# Patient Record
Sex: MALE | Race: WHITE | Employment: FULL TIME | URBAN - NONMETROPOLITAN AREA
[De-identification: names, ages, dates, MRNs, and addresses within clinical notes are randomized per-mention and may not be internally consistent; named-entity substitution may affect disease eponyms.]

---

## 2018-02-05 ENCOUNTER — APPOINTMENT (OUTPATIENT)
Dept: GENERAL RADIOLOGY | Age: 53
End: 2018-02-05
Payer: COMMERCIAL

## 2018-02-05 ENCOUNTER — HOSPITAL ENCOUNTER (EMERGENCY)
Age: 53
Discharge: HOME | End: 2018-02-05
Attending: EMERGENCY MEDICINE
Payer: COMMERCIAL

## 2018-02-05 VITALS
OXYGEN SATURATION: 98 % | HEIGHT: 70 IN | RESPIRATION RATE: 18 BRPM | WEIGHT: 203 LBS | HEART RATE: 82 BPM | BODY MASS INDEX: 29.06 KG/M2 | SYSTOLIC BLOOD PRESSURE: 119 MMHG | DIASTOLIC BLOOD PRESSURE: 76 MMHG | TEMPERATURE: 98.3 F

## 2018-02-05 DIAGNOSIS — S80.11XA HEMATOMA OF RIGHT LOWER EXTREMITY, INITIAL ENCOUNTER: Primary | ICD-10-CM

## 2018-02-05 PROCEDURE — 73502 X-RAY EXAM HIP UNI 2-3 VIEWS: CPT

## 2018-02-05 PROCEDURE — 6370000000 HC RX 637 (ALT 250 FOR IP): Performed by: EMERGENCY MEDICINE

## 2018-02-05 PROCEDURE — 99283 EMERGENCY DEPT VISIT LOW MDM: CPT

## 2018-02-05 PROCEDURE — 73590 X-RAY EXAM OF LOWER LEG: CPT

## 2018-02-05 RX ORDER — HYDROCODONE BITARTRATE AND ACETAMINOPHEN 5; 325 MG/1; MG/1
1 TABLET ORAL ONCE
Status: COMPLETED | OUTPATIENT
Start: 2018-02-05 | End: 2018-02-05

## 2018-02-05 RX ORDER — HYDROCODONE BITARTRATE AND ACETAMINOPHEN 5; 325 MG/1; MG/1
1 TABLET ORAL EVERY 6 HOURS PRN
Qty: 12 TABLET | Refills: 0 | Status: SHIPPED | OUTPATIENT
Start: 2018-02-05 | End: 2018-02-12

## 2018-02-05 RX ADMIN — HYDROCODONE BITARTRATE AND ACETAMINOPHEN 1 TABLET: 5; 325 TABLET ORAL at 20:42

## 2018-02-05 ASSESSMENT — PAIN DESCRIPTION - LOCATION: LOCATION: LEG

## 2018-02-05 ASSESSMENT — PAIN SCALES - GENERAL
PAINLEVEL_OUTOF10: 3
PAINLEVEL_OUTOF10: 2
PAINLEVEL_OUTOF10: 3

## 2018-02-05 ASSESSMENT — PAIN DESCRIPTION - ORIENTATION: ORIENTATION: RIGHT

## 2018-02-06 ENCOUNTER — HOSPITAL ENCOUNTER (OUTPATIENT)
Dept: GENERAL RADIOLOGY | Age: 53
Discharge: HOME | End: 2018-02-06
Payer: COMMERCIAL

## 2018-02-06 ENCOUNTER — HOSPITAL ENCOUNTER (OUTPATIENT)
Dept: INTERVENTIONAL RADIOLOGY/VASCULAR | Age: 53
Discharge: HOME | End: 2018-02-06
Payer: COMMERCIAL

## 2018-02-06 ENCOUNTER — HOSPITAL ENCOUNTER (OUTPATIENT)
Age: 53
Discharge: HOME | End: 2018-02-06
Payer: COMMERCIAL

## 2018-02-06 DIAGNOSIS — M25.571 RIGHT ANKLE PAIN, UNSPECIFIED CHRONICITY: ICD-10-CM

## 2018-02-06 DIAGNOSIS — R52 PAIN: ICD-10-CM

## 2018-02-06 PROCEDURE — 93971 EXTREMITY STUDY: CPT

## 2018-02-06 PROCEDURE — 73610 X-RAY EXAM OF ANKLE: CPT

## 2018-02-06 NOTE — ED TRIAGE NOTES
Pt fell into car door apx. 30 minutes prior to arrival. Large hematoma noted to right shin area and mild pain noted to right thigh.

## 2018-02-06 NOTE — ED PROVIDER NOTES
eMERGENCY dEPARTMENT eNCOUnter      [facility]    Chief Complaint   Patient presents with    Leg Injury     right lower leg, thigh       HPI    Mya Grullon is a 46 y.o. male who presents  Above-noted complaint. She was doing fine. He is a kidney transplant patient. It is [place]. Slept essentially kind of taking his shin into the car area. He then landed on his right buttocks and hip. Complains of hematoma to his right tib-fib area and right buttocks/hip pain. He is able to ambulate. PAST MEDICAL HISTORY    Past Medical History:   Diagnosis Date    Non Hodgkin's lymphoma ([address]     1995/96       SURGICAL HISTORY    Past Surgical History:   Procedure Laterality Date    APPENDECTOMY      0    KIDNEY TRANSPLANT      2000    KIDNEY TRANSPLANT  06/06/2000       CURRENT MEDICATIONS    Current Outpatient Rx   Medication Sig Dispense Refill    Alendronate Sodium 70 MG TBEF Take by mouth      lovastatin (MEVACOR) 10 MG tablet Take 10 mg by mouth nightly.  mycophenolate (CELLCEPT) 500 MG tablet Take 1,250 mg by mouth 2 times daily.  Ascorbic Acid (VITAMIN C) 500 MG tablet Take 1,000 mg by mouth daily.  therapeutic multivitamin-minerals (THERAGRAN-M) tablet Take 1 tablet by mouth daily.  predniSONE (DELTASONE) 10 MG tablet Take 10 mg by mouth daily.  furosemide (LASIX) 40 MG tablet Take 20 mg by mouth daily       aspirin 81 MG tablet Take 81 mg by mouth daily.  enalapril (VASOTEC) 2.5 MG tablet Take 2.5 mg by mouth daily.  sulfamethoxazole-trimethoprim (BACTRIM DS;SEPTRA DS) 800-160 MG per tablet Take 1 tablet by mouth once.  terbinafine (LAMISIL) 250 MG tablet Take 250 mg by mouth daily      Misc Natural Products (GLUCOSAMINE CHOND COMPLEX/MSM PO) Take  by mouth.          ALLERGIES    No Known Allergies    FAMILY HISTORY    Family History   Problem Relation Age of Onset    Heart Failure Father      heart attack w/stents    Diabetes Sister      5 years ago diagnosed    Diabetes Brother      5 years ago diagnosed    Cancer Mother 61     colon is now         SOCIAL HISTORY    Social History     Social History    Marital status:      Spouse name: N/A    Number of children: N/A    Years of education: N/A     Social History Main Topics    Smoking status: Never Smoker    Smokeless tobacco: Never Used    Alcohol use Yes      Comment: very rare use.  Drug use: No    Sexual activity: Not Asked     Other Topics Concern    None     Social History Narrative    None       REVIEW OF SYSTEMS    Positive for right tibial pain and right gluteal and hip pain. No loss of consciousness spinal pain neck pain chest pain abdominal pain  All systems negative except as marked. PHYSICAL EXAM    VITAL SIGNS: /76   Pulse 82   Temp 98.3 °F (36.8 °C) (Oral)   Resp 18   Ht 5' 10\" (1.778 m)   Wt 203 lb (92.1 kg)   SpO2 98%   BMI 29.13 kg/m²    Constitutional:  Alert not toxtic or ill, able to give coherent history  HENT:  Normocephalic, Atraumatic, Bilateral external ears normal, Oropharynx moist, No oral exudates, Nose normal.  Dry blood at the left nose although he states he did not hit his head  Cervical Spine: Normal range of motion,  No stridor. No tenderness, Supple,  Eyes:  No discharge or  Swelling,Conjunctiva normal, PERRL, EOMI,  Respiratory: No respiratory distress, Normal breath sounds,  No wheezing, No chest tenderness. Cardiovascular:  Normal heart rate, Normal rhythm, No murmurs, No rubs, No gallops. GI:  No reproducible pain, Bowel sounds normal, Soft, No masses, No pulsatile masses. No tenderness  Musculoskeletal:  Intact distal pulses, large anterior tibial hematoma without expanding hematoma or pulsatile bleeding. Calf and foot and ankle are normal.  Neurovascular exam is normal.  Pain is coming to the right gluteal area without step-off or bony deformity. Back:No tenderness.    Integument:  Warm, Dry, No erythema, No rash (on exposed areas)   Lymphatic:  No lymphadenopathy noted. Neurologic:  Alert & oriented x 3, Normal motor function, Normal sensory function, No focal deficits noted. Psychiatric:  Affect normal, Judgment normal, Mood normal.                     RADIOLOGY    XR TIBIA FIBULA RIGHT (2 VIEWS)   Final Result    No acute fracture or dislocation. Old healed fracture of the proximal/mid right fibula. **This report has been created using voice recognition software. It may contain minor errors which are inherent in voice recognition technology. **      Final report electronically signed by Dr. Kike Navarro on 2/5/2018 9:21 PM      XR HIP 2-3 VW W PELVIS RIGHT   Final Result    No acute bone or joint abnormality. **This report has been created using voice recognition software. It may contain minor errors which are inherent in voice recognition technology. **      Final report electronically signed by Dr. Kike Navarro on 2/5/2018 9:19 PM          PROCEDURES    none      CONSULTS:  None      CRITICAL CARE:  None    ED COURSE & MEDICAL DECISION MAKING    Pertinent Labs & Imaging studies reviewed. (See chart for details)  Right hip pain and right tibial pain. Hip joint appears to be nontoxic or irritable. Although we'll check some plain films at this time. Does have a right tibial hematoma without obvious expanding hematoma or signs of compartment syndrome. His foot and ankle and tib-fib and heel and calf are normal.    REASSESSMENT  Patient rechecked and updated on lab/xray status, progress and results. .  Patient was reassessed and condition was unchanged after tx. No further needs at this time. X-ray shows some changes to the mid shaft fibula near his hematoma although it almost appears chronic in nature or some type of nonunion. In discussion with the patient he states he had age 12 he had an injury and basically never had it looked at.   I do question whether he has a fracture

## 2018-02-16 ENCOUNTER — HOSPITAL ENCOUNTER (OUTPATIENT)
Dept: MRI IMAGING | Age: 53
Discharge: HOME | End: 2018-02-16
Payer: COMMERCIAL

## 2018-02-16 DIAGNOSIS — M25.571 ACUTE RIGHT ANKLE PAIN: ICD-10-CM

## 2018-02-16 DIAGNOSIS — S80.11XA CONTUSION OF RIGHT LOWER EXTREMITY, INITIAL ENCOUNTER: ICD-10-CM

## 2018-02-16 PROCEDURE — 73718 MRI LOWER EXTREMITY W/O DYE: CPT

## 2018-02-20 ENCOUNTER — HOSPITAL ENCOUNTER (OUTPATIENT)
Dept: GENERAL RADIOLOGY | Age: 53
Discharge: HOME | End: 2018-02-20
Payer: COMMERCIAL

## 2018-02-20 ENCOUNTER — HOSPITAL ENCOUNTER (OUTPATIENT)
Age: 53
Discharge: HOME | End: 2018-02-20
Payer: COMMERCIAL

## 2018-02-20 DIAGNOSIS — Z01.818 PREOP EXAMINATION: ICD-10-CM

## 2018-02-20 DIAGNOSIS — S80.11XA HEMATOMA OF LOWER LIMB, RIGHT, INITIAL ENCOUNTER: ICD-10-CM

## 2018-02-20 LAB
ANION GAP SERPL CALCULATED.3IONS-SCNC: 13 MEQ/L (ref 8–16)
ANISOCYTOSIS: ABNORMAL
BASOPHILS # BLD: 0.4 %
BASOPHILS ABSOLUTE: 0 THOU/MM3 (ref 0–0.1)
BUN BLDV-MCNC: 17 MG/DL (ref 7–22)
CALCIUM SERPL-MCNC: 9.5 MG/DL (ref 8.5–10.5)
CHLORIDE BLD-SCNC: 102 MEQ/L (ref 98–111)
CO2: 25 MEQ/L (ref 23–33)
CREAT SERPL-MCNC: 1 MG/DL (ref 0.4–1.2)
EKG ATRIAL RATE: 79 BPM
EKG P AXIS: 73 DEGREES
EKG P-R INTERVAL: 150 MS
EKG Q-T INTERVAL: 400 MS
EKG QRS DURATION: 96 MS
EKG QTC CALCULATION (BAZETT): 458 MS
EKG R AXIS: 7 DEGREES
EKG T AXIS: 61 DEGREES
EKG VENTRICULAR RATE: 79 BPM
EOSINOPHIL # BLD: 0.3 %
EOSINOPHILS ABSOLUTE: 0 THOU/MM3 (ref 0–0.4)
GFR SERPL CREATININE-BSD FRML MDRD: 78 ML/MIN/1.73M2
GLUCOSE BLD-MCNC: 111 MG/DL (ref 70–108)
HCT VFR BLD CALC: 42 % (ref 42–52)
HEMOGLOBIN: 13.5 GM/DL (ref 14–18)
LYMPHOCYTES # BLD: 17.2 %
LYMPHOCYTES ABSOLUTE: 1.4 THOU/MM3 (ref 1–4.8)
MCH RBC QN AUTO: 30.2 PG (ref 27–31)
MCHC RBC AUTO-ENTMCNC: 32.2 GM/DL (ref 33–37)
MCV RBC AUTO: 93.8 FL (ref 80–94)
MONOCYTES # BLD: 7.1 %
MONOCYTES ABSOLUTE: 0.6 THOU/MM3 (ref 0.4–1.3)
MRSA SCREEN RT-PCR: NEGATIVE
NUCLEATED RED BLOOD CELLS: 0 /100 WBC
PDW BLD-RTO: 15.7 % (ref 11.5–14.5)
PLATELET # BLD: 222 THOU/MM3 (ref 130–400)
PMV BLD AUTO: 8.4 FL (ref 7.4–10.4)
POTASSIUM SERPL-SCNC: 4.6 MEQ/L (ref 3.5–5.2)
RBC # BLD: 4.48 MILL/MM3 (ref 4.7–6.1)
SEG NEUTROPHILS: 75 %
SEGMENTED NEUTROPHILS ABSOLUTE COUNT: 6.2 THOU/MM3 (ref 1.8–7.7)
SODIUM BLD-SCNC: 140 MEQ/L (ref 135–145)
WBC # BLD: 8.2 THOU/MM3 (ref 4.8–10.8)

## 2018-02-20 PROCEDURE — 71046 X-RAY EXAM CHEST 2 VIEWS: CPT

## 2018-02-20 PROCEDURE — 80048 BASIC METABOLIC PNL TOTAL CA: CPT

## 2018-02-20 PROCEDURE — 87641 MR-STAPH DNA AMP PROBE: CPT

## 2018-02-20 PROCEDURE — 85025 COMPLETE CBC W/AUTO DIFF WBC: CPT

## 2018-02-20 PROCEDURE — 93005 ELECTROCARDIOGRAM TRACING: CPT | Performed by: ORTHOPAEDIC SURGERY

## 2018-02-20 PROCEDURE — 36415 COLL VENOUS BLD VENIPUNCTURE: CPT

## 2018-02-22 ENCOUNTER — OFFICE VISIT (OUTPATIENT)
Dept: INTERNAL MEDICINE CLINIC | Age: 53
End: 2018-02-22
Payer: COMMERCIAL

## 2018-02-22 VITALS
HEIGHT: 70 IN | DIASTOLIC BLOOD PRESSURE: 60 MMHG | HEART RATE: 88 BPM | WEIGHT: 202 LBS | BODY MASS INDEX: 28.92 KG/M2 | SYSTOLIC BLOOD PRESSURE: 120 MMHG

## 2018-02-22 DIAGNOSIS — S80.11XA HEMATOMA OF RIGHT LOWER EXTREMITY, INITIAL ENCOUNTER: ICD-10-CM

## 2018-02-22 DIAGNOSIS — Z79.52 CURRENT CHRONIC USE OF SYSTEMIC STEROIDS: ICD-10-CM

## 2018-02-22 DIAGNOSIS — Z01.818 PREOP EXAM FOR INTERNAL MEDICINE: Primary | ICD-10-CM

## 2018-02-22 DIAGNOSIS — I10 ESSENTIAL HYPERTENSION: ICD-10-CM

## 2018-02-22 DIAGNOSIS — I63.9 CEREBROVASCULAR ACCIDENT (CVA), UNSPECIFIED MECHANISM (HCC): ICD-10-CM

## 2018-02-22 DIAGNOSIS — Z94.0 RENAL TRANSPLANT RECIPIENT: ICD-10-CM

## 2018-02-22 DIAGNOSIS — E78.5 HYPERLIPIDEMIA, UNSPECIFIED HYPERLIPIDEMIA TYPE: ICD-10-CM

## 2018-02-22 DIAGNOSIS — Z85.72 HISTORY OF NON-HODGKIN'S LYMPHOMA: ICD-10-CM

## 2018-02-22 PROCEDURE — 99244 OFF/OP CNSLTJ NEW/EST MOD 40: CPT | Performed by: INTERNAL MEDICINE

## 2018-02-22 RX ORDER — MYCOPHENOLATE MOFETIL 250 MG/1
1000 CAPSULE ORAL 2 TIMES DAILY
COMMUNITY

## 2018-02-22 NOTE — PROGRESS NOTES
daily       enalapril (VASOTEC) 2.5 MG tablet Take 2.5 mg by mouth daily.  sulfamethoxazole-trimethoprim (BACTRIM DS;SEPTRA DS) 800-160 MG per tablet Take 1 tablet by mouth once.  aspirin 81 MG tablet Take 81 mg by mouth daily. No current facility-administered medications for this visit. No Known Allergies    Social History     Social History    Marital status:      Spouse name: N/A    Number of children: N/A    Years of education: N/A     Occupational History    Not on file. Social History Main Topics    Smoking status: Never Smoker    Smokeless tobacco: Never Used    Alcohol use 0.6 oz/week     1 Shots of liquor per week      Comment: very rare use.  Drug use: No    Sexual activity: Not on file     Other Topics Concern    Not on file     Social History Narrative    No narrative on file       Family History   Problem Relation Age of Onset    Heart Failure Father      heart attack w/stents    Diabetes Sister      5 years ago diagnosed    Diabetes Brother      5 years ago diagnosed   [facility] Cancer Mother 61     colon - is now         Review of Systems - General ROS: negative for - chills or fever  Psychological ROS: negative for - anxiety and depression  Hematological and Lymphatic ROS: No history of blood clots or bleeding disorder.    Respiratory ROS: no cough, shortness of breath, or wheezing  Cardiovascular ROS: no chest pain or dyspnea on exertion, tolerates a flight of stairs, vacuuming  Gastrointestinal ROS: no abdominal pain, change in bowel habits, or black or bloody stools  Genito-Urinary ROS: no dysuria, trouble voiding, or hematuria  Musculoskeletal ROS: negative for - muscle pain or muscular weakness, positive right shin pain intermittently  Neurological ROS: negative for - headaches, seizures or weakness  Dermatological ROS: negative for - rash or skin lesion changes    Blood pressure 120/60, pulse 88, height 5' 10\" (1.778 m), weight 202 lb (91.6

## 2018-02-25 PROBLEM — Z85.72 HISTORY OF NON-HODGKIN'S LYMPHOMA: Status: ACTIVE | Noted: 2018-02-25

## 2020-10-19 ENCOUNTER — HOSPITAL ENCOUNTER (OUTPATIENT)
Age: 55
Discharge: HOME | End: 2020-10-19
Payer: COMMERCIAL

## 2020-10-19 LAB
EKG ATRIAL RATE: 70 BPM
EKG P AXIS: 78 DEGREES
EKG P-R INTERVAL: 158 MS
EKG Q-T INTERVAL: 418 MS
EKG QRS DURATION: 92 MS
EKG QTC CALCULATION (BAZETT): 451 MS
EKG R AXIS: -27 DEGREES
EKG T AXIS: 17 DEGREES
EKG VENTRICULAR RATE: 70 BPM

## 2020-10-19 PROCEDURE — 93005 ELECTROCARDIOGRAM TRACING: CPT | Performed by: SPECIALIST

## 2020-10-20 PROCEDURE — 93010 ELECTROCARDIOGRAM REPORT: CPT | Performed by: NUCLEAR MEDICINE

## 2020-10-20 RX ORDER — SODIUM CHLORIDE 9 MG/ML
INJECTION, SOLUTION INTRAVENOUS
Status: DISCONTINUED | OUTPATIENT
Start: 2020-10-28 | End: 2020-10-20 | Stop reason: CLARIF

## 2020-10-23 ENCOUNTER — HOSPITAL ENCOUNTER (OUTPATIENT)
Age: 55
Setting detail: SPECIMEN
Discharge: HOME | End: 2020-10-23
Payer: COMMERCIAL

## 2020-10-23 PROCEDURE — U0003 INFECTIOUS AGENT DETECTION BY NUCLEIC ACID (DNA OR RNA); SEVERE ACUTE RESPIRATORY SYNDROME CORONAVIRUS 2 (SARS-COV-2) (CORONAVIRUS DISEASE [COVID-19]), AMPLIFIED PROBE TECHNIQUE, MAKING USE OF HIGH THROUGHPUT TECHNOLOGIES AS DESCRIBED BY CMS-2020-01-R: HCPCS

## 2020-10-25 LAB — SARS-COV-2: NOT DETECTED

## 2020-10-28 ENCOUNTER — ANESTHESIA (OUTPATIENT)
Dept: OPERATING ROOM | Age: 55
End: 2020-10-28
Payer: COMMERCIAL

## 2020-10-28 ENCOUNTER — ANESTHESIA EVENT (OUTPATIENT)
Dept: OPERATING ROOM | Age: 55
End: 2020-10-28
Payer: COMMERCIAL

## 2020-10-28 ENCOUNTER — HOSPITAL ENCOUNTER (OUTPATIENT)
Age: 55
Setting detail: OUTPATIENT SURGERY
Discharge: HOME | End: 2020-10-28
Attending: SPECIALIST | Admitting: SPECIALIST
Payer: COMMERCIAL

## 2020-10-28 VITALS
SYSTOLIC BLOOD PRESSURE: 99 MMHG | HEART RATE: 71 BPM | OXYGEN SATURATION: 99 % | BODY MASS INDEX: 30.86 KG/M2 | TEMPERATURE: 96.9 F | WEIGHT: 215.6 LBS | DIASTOLIC BLOOD PRESSURE: 58 MMHG | HEIGHT: 70 IN | RESPIRATION RATE: 16 BRPM

## 2020-10-28 VITALS
SYSTOLIC BLOOD PRESSURE: 103 MMHG | TEMPERATURE: 96.8 F | DIASTOLIC BLOOD PRESSURE: 57 MMHG | OXYGEN SATURATION: 100 % | RESPIRATION RATE: 8 BRPM

## 2020-10-28 PROCEDURE — 2580000003 HC RX 258: Performed by: NURSE ANESTHETIST, CERTIFIED REGISTERED

## 2020-10-28 PROCEDURE — 2500000003 HC RX 250 WO HCPCS: Performed by: NURSE ANESTHETIST, CERTIFIED REGISTERED

## 2020-10-28 PROCEDURE — 88305 TISSUE EXAM BY PATHOLOGIST: CPT

## 2020-10-28 PROCEDURE — 3600000012 HC SURGERY LEVEL 2 ADDTL 15MIN: Performed by: SPECIALIST

## 2020-10-28 PROCEDURE — 7100000011 HC PHASE II RECOVERY - ADDTL 15 MIN: Performed by: SPECIALIST

## 2020-10-28 PROCEDURE — 3600000002 HC SURGERY LEVEL 2 BASE: Performed by: SPECIALIST

## 2020-10-28 PROCEDURE — 3700000001 HC ADD 15 MINUTES (ANESTHESIA): Performed by: SPECIALIST

## 2020-10-28 PROCEDURE — 6360000002 HC RX W HCPCS: Performed by: NURSE ANESTHETIST, CERTIFIED REGISTERED

## 2020-10-28 PROCEDURE — 3700000000 HC ANESTHESIA ATTENDED CARE: Performed by: SPECIALIST

## 2020-10-28 PROCEDURE — 2500000003 HC RX 250 WO HCPCS: Performed by: SPECIALIST

## 2020-10-28 PROCEDURE — 2709999900 HC NON-CHARGEABLE SUPPLY: Performed by: SPECIALIST

## 2020-10-28 PROCEDURE — 7100000010 HC PHASE II RECOVERY - FIRST 15 MIN: Performed by: SPECIALIST

## 2020-10-28 RX ORDER — PROPOFOL 10 MG/ML
INJECTION, EMULSION INTRAVENOUS CONTINUOUS PRN
Status: DISCONTINUED | OUTPATIENT
Start: 2020-10-28 | End: 2020-10-28 | Stop reason: SDUPTHER

## 2020-10-28 RX ORDER — MIDAZOLAM HYDROCHLORIDE 1 MG/ML
INJECTION INTRAMUSCULAR; INTRAVENOUS PRN
Status: DISCONTINUED | OUTPATIENT
Start: 2020-10-28 | End: 2020-10-28 | Stop reason: SDUPTHER

## 2020-10-28 RX ORDER — SODIUM CHLORIDE 9 MG/ML
INJECTION, SOLUTION INTRAVENOUS CONTINUOUS
Status: DISCONTINUED | OUTPATIENT
Start: 2020-10-28 | End: 2020-10-28 | Stop reason: HOSPADM

## 2020-10-28 RX ORDER — LIDOCAINE HYDROCHLORIDE AND EPINEPHRINE 10; 10 MG/ML; UG/ML
INJECTION, SOLUTION INFILTRATION; PERINEURAL PRN
Status: DISCONTINUED | OUTPATIENT
Start: 2020-10-28 | End: 2020-10-28 | Stop reason: ALTCHOICE

## 2020-10-28 RX ORDER — LIDOCAINE HYDROCHLORIDE 20 MG/ML
INJECTION, SOLUTION EPIDURAL; INFILTRATION; INTRACAUDAL; PERINEURAL PRN
Status: DISCONTINUED | OUTPATIENT
Start: 2020-10-28 | End: 2020-10-28 | Stop reason: SDUPTHER

## 2020-10-28 RX ORDER — ONDANSETRON 2 MG/ML
INJECTION INTRAMUSCULAR; INTRAVENOUS PRN
Status: DISCONTINUED | OUTPATIENT
Start: 2020-10-28 | End: 2020-10-28 | Stop reason: SDUPTHER

## 2020-10-28 RX ORDER — CEFAZOLIN SODIUM 1 G/3ML
INJECTION, POWDER, FOR SOLUTION INTRAMUSCULAR; INTRAVENOUS PRN
Status: DISCONTINUED | OUTPATIENT
Start: 2020-10-28 | End: 2020-10-28 | Stop reason: SDUPTHER

## 2020-10-28 RX ORDER — SODIUM CHLORIDE 9 MG/ML
INJECTION, SOLUTION INTRAVENOUS CONTINUOUS PRN
Status: DISCONTINUED | OUTPATIENT
Start: 2020-10-28 | End: 2020-10-28 | Stop reason: SDUPTHER

## 2020-10-28 RX ORDER — FENTANYL CITRATE 50 UG/ML
INJECTION, SOLUTION INTRAMUSCULAR; INTRAVENOUS PRN
Status: DISCONTINUED | OUTPATIENT
Start: 2020-10-28 | End: 2020-10-28 | Stop reason: SDUPTHER

## 2020-10-28 RX ORDER — PROPOFOL 10 MG/ML
INJECTION, EMULSION INTRAVENOUS PRN
Status: DISCONTINUED | OUTPATIENT
Start: 2020-10-28 | End: 2020-10-28 | Stop reason: SDUPTHER

## 2020-10-28 RX ADMIN — ONDANSETRON HYDROCHLORIDE 4 MG: 4 INJECTION, SOLUTION INTRAMUSCULAR; INTRAVENOUS at 09:50

## 2020-10-28 RX ADMIN — LIDOCAINE HYDROCHLORIDE 60 MG: 20 INJECTION, SOLUTION EPIDURAL; INFILTRATION; INTRACAUDAL; PERINEURAL at 09:20

## 2020-10-28 RX ADMIN — CEFAZOLIN 2000 MG: 1 INJECTION, POWDER, FOR SOLUTION INTRAMUSCULAR; INTRAVENOUS; PARENTERAL at 09:24

## 2020-10-28 RX ADMIN — FENTANYL CITRATE 100 MCG: 50 INJECTION, SOLUTION INTRAMUSCULAR; INTRAVENOUS at 09:20

## 2020-10-28 RX ADMIN — SODIUM CHLORIDE: 9 INJECTION, SOLUTION INTRAVENOUS at 09:14

## 2020-10-28 RX ADMIN — MIDAZOLAM HYDROCHLORIDE 2 MG: 1 INJECTION, SOLUTION INTRAMUSCULAR; INTRAVENOUS at 09:16

## 2020-10-28 RX ADMIN — PROPOFOL 120 MCG/KG/MIN: 10 INJECTION, EMULSION INTRAVENOUS at 09:21

## 2020-10-28 RX ADMIN — PROPOFOL 40 MG: 10 INJECTION, EMULSION INTRAVENOUS at 09:20

## 2020-10-28 ASSESSMENT — PULMONARY FUNCTION TESTS
PIF_VALUE: 0
PIF_VALUE: 1
PIF_VALUE: 0
PIF_VALUE: 1
PIF_VALUE: 0
PIF_VALUE: 1
PIF_VALUE: 1
PIF_VALUE: 0
PIF_VALUE: 0
PIF_VALUE: 1
PIF_VALUE: 0
PIF_VALUE: 1
PIF_VALUE: 1
PIF_VALUE: 0
PIF_VALUE: 1
PIF_VALUE: 1
PIF_VALUE: 0
PIF_VALUE: 1
PIF_VALUE: 1
PIF_VALUE: 0
PIF_VALUE: 1
PIF_VALUE: 0
PIF_VALUE: 1
PIF_VALUE: 0
PIF_VALUE: 1
PIF_VALUE: 0
PIF_VALUE: 0

## 2020-10-28 NOTE — PROGRESS NOTES
7083 patient to pacu via chair. Surgery RN and CRNA at bedside with report. IV patent and infusing. Vitals stable. Incision covered and wrapped. 1003 gave snack and drink.     1015 IV removed no complications  6140 Dr Roxi Singh in to see patient  [phone] reviewed discharge instructions with patient and family    [phone] escorted patient to discharge
Dressing= bacitracin, xeroform, gauze, kerlix, and ace
5 - 8 questions    Adapted from:   STOP Questionnaire: A Tool to Screen Patients for Obstructive Sleep Apnea   LEOBARDO Vang., BRET Hughes.B.B.S., Brittani Salinas M.D., Ellamae Holter. Tru Childress, Ph.D., KAMLESH ChristianB.B.S., Francis Amaya, M.Sc., Richa Carlin M.D., Velvet Loud. MARY Montes De OcaC.    Anesthesiology 2008; [id number] Copyright 2008, the [facility] [address] of Anesthesiologists, [place] [address].   ----------------------------------------------------------------------------------------------------------------

## 2020-10-28 NOTE — ANESTHESIA POSTPROCEDURE EVALUATION
Department of Anesthesiology  Postprocedure Note    Patient: Megan Prescott  MRN: [id number]  YOB: 1965  Date of evaluation: 10/28/2020  Time:  12:37 PM     Procedure Summary     Date:  10/28/20 Room / Location:  [address] [place] [address] 04 / Skylar Holt    Anesthesia Start:  4720 Anesthesia Stop:  1001    Procedure:  WIDER EXCISION MELANOMA VERTEX OF SCALP (N/A ) Diagnosis:  (MELANOMA VERTEX OF SCALP)    Surgeon:  Franc Brewer MD Responsible Provider:  Facundo Wall MD    Anesthesia Type:  MAC ASA Status:  3          Anesthesia Type: MAC    Sweta Phase I:      Sweta Phase II: Sweta Score: 10    Last vitals: Reviewed and per EMR flowsheets.        Anesthesia Post Evaluation

## 2020-10-28 NOTE — OP NOTE
Same       Procedure(s):  WIDER EXCISION MELANOMA VERTEX OF SCALP    Surgeon(s):  Rachna Purcell MD    Assistant:   Physician Assistant: Selina Roy PA-C    Anesthesia: Monitor Anesthesia Care    Estimated Blood Loss (mL): Minimal    Complications: None    Specimens:   ID Type Source Tests Collected by Time Destination   A :  Tissue Scalp SURGICAL PATHOLOGY Rachna Purcell MD 10/28/2020 1810        Implants:  * No implants in log *      Drains: * No LDAs found *    Findings: 3.5cm length, 0.3mm Breslow depth malignant melanoma of vertex of scalp    Detailed Description of Procedure:    Wide excision of vertex of scalp melanoma creating a 8cm2 defect that was repaired with an  adjacent tissue transfer (20 cm2) (CPT 75009)    Electronically signed by Rachna Purcell MD on 10/28/2020 at 10:14 AM

## 2020-12-05 ENCOUNTER — HOSPITAL ENCOUNTER (OUTPATIENT)
Dept: GENERAL RADIOLOGY | Age: 55
Discharge: HOME | End: 2020-12-05
Payer: COMMERCIAL

## 2020-12-05 ENCOUNTER — NURSE ONLY (OUTPATIENT)
Dept: LAB | Age: 55
End: 2020-12-05

## 2020-12-05 ENCOUNTER — HOSPITAL ENCOUNTER (OUTPATIENT)
Age: 55
Discharge: HOME | End: 2020-12-05
Payer: COMMERCIAL

## 2020-12-05 LAB
ANION GAP SERPL CALCULATED.3IONS-SCNC: 10 MEQ/L (ref 8–16)
BASOPHILS # BLD: 0.4 %
BASOPHILS ABSOLUTE: 0 THOU/MM3 (ref 0–0.1)
BUN BLDV-MCNC: 17 MG/DL (ref 7–22)
CALCIUM SERPL-MCNC: 9.2 MG/DL (ref 8.5–10.5)
CHLORIDE BLD-SCNC: 107 MEQ/L (ref 98–111)
CO2: 27 MEQ/L (ref 23–33)
CREAT SERPL-MCNC: 1 MG/DL (ref 0.4–1.2)
EOSINOPHIL # BLD: 2 %
EOSINOPHILS ABSOLUTE: 0.2 THOU/MM3 (ref 0–0.4)
ERYTHROCYTE [DISTWIDTH] IN BLOOD BY AUTOMATED COUNT: 13.8 % (ref 11.5–14.5)
ERYTHROCYTE [DISTWIDTH] IN BLOOD BY AUTOMATED COUNT: 50 FL (ref 35–45)
GFR SERPL CREATININE-BSD FRML MDRD: 78 ML/MIN/1.73M2
GLUCOSE BLD-MCNC: 81 MG/DL (ref 70–108)
HCT VFR BLD CALC: 44.7 % (ref 42–52)
HEMOGLOBIN: 14 GM/DL (ref 14–18)
IMMATURE GRANS (ABS): 0.05 THOU/MM3 (ref 0–0.07)
IMMATURE GRANULOCYTES: 0.7 %
LYMPHOCYTES # BLD: 40.8 %
LYMPHOCYTES ABSOLUTE: 3.1 THOU/MM3 (ref 1–4.8)
MAGNESIUM: 2.3 MG/DL (ref 1.6–2.4)
MCH RBC QN AUTO: 30.6 PG (ref 26–33)
MCHC RBC AUTO-ENTMCNC: 31.3 GM/DL (ref 32.2–35.5)
MCV RBC AUTO: 97.8 FL (ref 80–94)
MONOCYTES # BLD: 10.3 %
MONOCYTES ABSOLUTE: 0.8 THOU/MM3 (ref 0.4–1.3)
NUCLEATED RED BLOOD CELLS: 0 /100 WBC
PHOSPHORUS: 2.5 MG/DL (ref 2.4–4.7)
PLATELET # BLD: 204 THOU/MM3 (ref 130–400)
PMV BLD AUTO: 9.3 FL (ref 9.4–12.4)
POTASSIUM SERPL-SCNC: 4.3 MEQ/L (ref 3.5–5.2)
RBC # BLD: 4.57 MILL/MM3 (ref 4.7–6.1)
SEG NEUTROPHILS: 45.8 %
SEGMENTED NEUTROPHILS ABSOLUTE COUNT: 3.4 THOU/MM3 (ref 1.8–7.7)
SODIUM BLD-SCNC: 144 MEQ/L (ref 135–145)
WBC # BLD: 7.5 THOU/MM3 (ref 4.8–10.8)

## 2020-12-05 PROCEDURE — 72072 X-RAY EXAM THORAC SPINE 3VWS: CPT

## 2020-12-05 PROCEDURE — 72040 X-RAY EXAM NECK SPINE 2-3 VW: CPT

## 2021-07-19 ENCOUNTER — HOSPITAL ENCOUNTER (OUTPATIENT)
Age: 56
Discharge: HOME | End: 2021-07-19
Payer: COMMERCIAL

## 2021-10-04 ENCOUNTER — APPOINTMENT (OUTPATIENT)
Dept: ULTRASOUND IMAGING | Age: 56
End: 2021-10-04
Payer: COMMERCIAL

## 2021-10-04 ENCOUNTER — HOSPITAL ENCOUNTER (EMERGENCY)
Age: 56
Discharge: HOME | End: 2021-10-04
Attending: EMERGENCY MEDICINE
Payer: COMMERCIAL

## 2021-10-04 VITALS
SYSTOLIC BLOOD PRESSURE: 132 MMHG | WEIGHT: 191 LBS | BODY MASS INDEX: 27.35 KG/M2 | TEMPERATURE: 98.6 F | DIASTOLIC BLOOD PRESSURE: 81 MMHG | HEIGHT: 70 IN | HEART RATE: 77 BPM | OXYGEN SATURATION: 99 % | RESPIRATION RATE: 16 BRPM

## 2021-10-04 DIAGNOSIS — R10.84 GENERALIZED ABDOMINAL PAIN: Primary | ICD-10-CM

## 2021-10-04 DIAGNOSIS — K80.50 BILIARY COLIC: ICD-10-CM

## 2021-10-04 LAB
ALBUMIN SERPL-MCNC: 4.7 G/DL (ref 3.5–5.1)
ALP BLD-CCNC: 86 U/L (ref 38–126)
ALT SERPL-CCNC: 16 U/L (ref 11–66)
ANION GAP SERPL CALCULATED.3IONS-SCNC: 8 MEQ/L (ref 8–16)
AST SERPL-CCNC: 15 U/L (ref 5–40)
BASOPHILS # BLD: 0.2 %
BASOPHILS ABSOLUTE: 0 THOU/MM3 (ref 0–0.1)
BILIRUB SERPL-MCNC: 0.3 MG/DL (ref 0.3–1.2)
BUN BLDV-MCNC: 14 MG/DL (ref 7–22)
BUN WHOLE BLOOD, UC: 16 MG/DL (ref 8–26)
CALCIUM SERPL-MCNC: 10.4 MG/DL (ref 8.5–10.5)
CHLORIDE BLD-SCNC: 102 MEQ/L (ref 98–111)
CHLORIDE, WHOLE BLOOD: 101 MEQ/L (ref 98–109)
CO2, WHOLE BLOOD: 28 MEQ/L (ref 23–33)
CO2: 30 MEQ/L (ref 23–33)
CREAT SERPL-MCNC: 0.9 MG/DL (ref 0.4–1.2)
CREATININE WHOLE BLOOD, UC: 0.9 MG/DL (ref 0.5–1.2)
EOSINOPHIL # BLD: 0.5 %
EOSINOPHILS ABSOLUTE: 0 THOU/MM3 (ref 0–0.4)
GFR SERPL CREATININE-BSD FRML MDRD: 87 ML/MIN/1.73M2
GFR, ESTIMATED: > 90 ML/MIN/1.73M2
GLUCOSE BLD-MCNC: 104 MG/DL (ref 70–108)
GLUCOSE, WHOLE BLOOD: 106 MG/DL (ref 70–108)
HCT VFR BLD CALC: 44 % (ref 42–52)
HEMOGLOBIN: 14.5 GM/DL (ref 14–18)
IMMATURE GRANS (ABS): 0.01 THOU/MM3 (ref 0–0.07)
IMMATURE GRANULOCYTES: 0.1 %
LIPASE: 39.8 U/L (ref 5.6–51.3)
LYMPHOCYTES # BLD: 32.9 %
LYMPHOCYTES ABSOLUTE: 2.9 THOU/MM3 (ref 1–4.8)
MCH RBC QN AUTO: 31 PG (ref 27–31)
MCHC RBC AUTO-ENTMCNC: 33 GM/DL (ref 33–37)
MCV RBC AUTO: 94 FL (ref 80–94)
MONOCYTES # BLD: 8.2 %
MONOCYTES ABSOLUTE: 0.7 THOU/MM3 (ref 0.4–1.3)
NUCLEATED RED BLOOD CELLS: 0 /100 WBC
OSMOLALITY CALCULATION: 280.2 MOSMOL/KG (ref 275–300)
PDW BLD-RTO: 14.6 % (ref 11.5–14.5)
PLATELET # BLD: 191 THOU/MM3 (ref 130–400)
PMV BLD AUTO: 9.7 FL (ref 7.4–10.4)
POTASSIUM REFLEX MAGNESIUM: 5.1 MEQ/L (ref 3.5–5.2)
POTASSIUM, WHOLE BLOOD: 4.9 MEQ/L (ref 3.5–4.9)
RBC # BLD: 4.68 MILL/MM3 (ref 4.7–6.1)
SEG NEUTROPHILS: 58.1 %
SEGMENTED NEUTROPHILS ABSOLUTE COUNT: 5.1 THOU/MM3 (ref 1.8–7.7)
SODIUM BLD-SCNC: 140 MEQ/L (ref 135–145)
SODIUM, WHOLE BLOOD: 141 MEQ/L (ref 138–146)
TOTAL PROTEIN: 6.3 G/DL (ref 6.1–8)
TROPONIN T: < 0.01 NG/ML
WBC # BLD: 8.7 THOU/MM3 (ref 4.8–10.8)

## 2021-10-04 PROCEDURE — 80051 ELECTROLYTE PANEL: CPT

## 2021-10-04 PROCEDURE — 84520 ASSAY OF UREA NITROGEN: CPT

## 2021-10-04 PROCEDURE — 93005 ELECTROCARDIOGRAM TRACING: CPT | Performed by: EMERGENCY MEDICINE

## 2021-10-04 PROCEDURE — 82947 ASSAY GLUCOSE BLOOD QUANT: CPT

## 2021-10-04 PROCEDURE — 99282 EMERGENCY DEPT VISIT SF MDM: CPT

## 2021-10-04 PROCEDURE — 85025 COMPLETE CBC W/AUTO DIFF WBC: CPT

## 2021-10-04 PROCEDURE — 82565 ASSAY OF CREATININE: CPT

## 2021-10-04 PROCEDURE — 99215 OFFICE O/P EST HI 40 MIN: CPT

## 2021-10-04 PROCEDURE — 36415 COLL VENOUS BLD VENIPUNCTURE: CPT

## 2021-10-04 PROCEDURE — 83690 ASSAY OF LIPASE: CPT

## 2021-10-04 PROCEDURE — 84484 ASSAY OF TROPONIN QUANT: CPT

## 2021-10-04 PROCEDURE — 80053 COMPREHEN METABOLIC PANEL: CPT

## 2021-10-04 PROCEDURE — 6370000000 HC RX 637 (ALT 250 FOR IP): Performed by: NURSE PRACTITIONER

## 2021-10-04 PROCEDURE — 76705 ECHO EXAM OF ABDOMEN: CPT

## 2021-10-04 PROCEDURE — 99202 OFFICE O/P NEW SF 15 MIN: CPT | Performed by: NURSE PRACTITIONER

## 2021-10-04 RX ORDER — CALCIUM CARBONATE 200(500)MG
1 TABLET,CHEWABLE ORAL DAILY
COMMUNITY
End: 2021-10-07 | Stop reason: ALTCHOICE

## 2021-10-04 RX ORDER — HYDROCODONE BITARTRATE AND ACETAMINOPHEN 5; 325 MG/1; MG/1
1 TABLET ORAL ONCE
Status: COMPLETED | OUTPATIENT
Start: 2021-10-04 | End: 2021-10-04

## 2021-10-04 RX ORDER — DICYCLOMINE HYDROCHLORIDE 10 MG/1
20 CAPSULE ORAL ONCE
Status: COMPLETED | OUTPATIENT
Start: 2021-10-04 | End: 2021-10-04

## 2021-10-04 RX ORDER — FAMOTIDINE 20 MG/1
20 TABLET, FILM COATED ORAL ONCE
Status: COMPLETED | OUTPATIENT
Start: 2021-10-04 | End: 2021-10-04

## 2021-10-04 RX ADMIN — HYDROCODONE BITARTRATE AND ACETAMINOPHEN 1 TABLET: 5; 325 TABLET ORAL at 22:35

## 2021-10-04 RX ADMIN — DICYCLOMINE HYDROCHLORIDE 20 MG: 10 CAPSULE ORAL at 18:24

## 2021-10-04 RX ADMIN — LIDOCAINE HYDROCHLORIDE: 20 SOLUTION ORAL; TOPICAL at 22:35

## 2021-10-04 RX ADMIN — FAMOTIDINE 20 MG: 20 TABLET, FILM COATED ORAL at 22:35

## 2021-10-04 ASSESSMENT — ENCOUNTER SYMPTOMS
RHINORRHEA: 0
NAUSEA: 0
CHEST TIGHTNESS: 0
COLOR CHANGE: 0
SINUS PRESSURE: 0
WHEEZING: 0
CONSTIPATION: 0
SHORTNESS OF BREATH: 0
DIARRHEA: 0
EYE PAIN: 0
VOMITING: 0
ABDOMINAL PAIN: 1
ABDOMINAL DISTENTION: 0
EYE DISCHARGE: 0
SORE THROAT: 0
COUGH: 0

## 2021-10-04 ASSESSMENT — PAIN SCALES - GENERAL
PAINLEVEL_OUTOF10: 5
PAINLEVEL_OUTOF10: 2
PAINLEVEL_OUTOF10: 7
PAINLEVEL_OUTOF10: 7

## 2021-10-04 ASSESSMENT — PAIN DESCRIPTION - PAIN TYPE: TYPE: ACUTE PAIN

## 2021-10-04 ASSESSMENT — PAIN DESCRIPTION - ORIENTATION
ORIENTATION: LEFT;RIGHT;LOWER
ORIENTATION: MID

## 2021-10-04 ASSESSMENT — PAIN DESCRIPTION - FREQUENCY
FREQUENCY: INTERMITTENT
FREQUENCY: INTERMITTENT

## 2021-10-04 ASSESSMENT — PAIN DESCRIPTION - DESCRIPTORS: DESCRIPTORS: SHARP

## 2021-10-04 ASSESSMENT — PAIN DESCRIPTION - LOCATION
LOCATION: ABDOMEN
LOCATION: ABDOMEN

## 2021-10-04 NOTE — ED PROVIDER NOTES
[facility]  Urgent Care Encounter       CHIEF COMPLAINT       Chief Complaint   Patient presents with    Abdominal Pain     mid  noraml bowel movement today  no problem with urination       Nurses Notes reviewed and I agree except as noted in the HPI. HISTORY OF PRESENT ILLNESS   Femi Gonsalves is a 64 y.o. male who presents to the urgent care center complaining of sharp pain across the mid abdomen. The patient stated that he ate lunch felt okay around noon and around 2 PM developed sharp pain across the mid abdomen. Since that time the pain has been persistent but did have episodes of waxing and waning as far as intensity of pain. The patient denied any fever, chills, nausea, vomiting diarrhea. Patient denied any urinary symptoms and states he has not urinated since the afternoon or while he has had abdominal pain but denied any problems this morning. The patient rates his pain 5 on a 10 scale. At present time patient is sitting on table does not appear to be in any acute distress but complains of generalized abdominal pain across the mid abdomen. Patient's wife is at the bedside. The history is provided by the patient. No  was used.    Abdominal Pain  Pain location:  Periumbilical  Pain quality: sharp    Pain radiates to:  Does not radiate  Pain severity:  Moderate  Onset quality:  Sudden  Duration:  4 hours  Timing:  Constant  Progression:  Waxing and waning  Chronicity:  New  Context: previous surgery    Context: not eating, not sick contacts and not suspicious food intake    Context comment:  Appendectomy as a child and a right kidney transplant  Relieved by:  Nothing  Worsened by:  Nothing  Ineffective treatments:  Antacids and OTC medications (Pepto-Bismol)  Associated symptoms: no chest pain, no constipation, no diarrhea, no dysuria, no fever, no nausea, no shortness of breath and no vomiting        REVIEW OF SYSTEMS     Review of Systems Nonallopathic lesion of sacral region, not elsewhere classified; Nonallopathic lesion of thoracic region, not elsewhere classified; Nonallopathic lesion of head region, not elsewhere classified; Nonallopathic lesion of cervical region, not elsewhere classified; Nonallopathic lesion of lumbar region, not elsewhere classified; Nonallopathic lesion of pelvic region, not elsewhere classified; Nonallopathic lesion of upper extremities, not elsewhere classified; Nonallopathic lesion of abdomen and other sites, not elsewhere classified      Ascorbic Acid (VITAMIN C) 500 MG tablet Take 1,000 mg by mouth daily. Associated Diagnoses: Sprain of lumbosacral (joint) (ligament); Sprain of neck; Thoracic sprain and strain; Nonallopathic lesion of rib cage, not elsewhere classified; Nonallopathic lesion of lower extremities, not elsewhere classified; Nonallopathic lesion of sacral region, not elsewhere classified; Nonallopathic lesion of thoracic region, not elsewhere classified; Nonallopathic lesion of head region, not elsewhere classified; Nonallopathic lesion of cervical region, not elsewhere classified; Nonallopathic lesion of lumbar region, not elsewhere classified; Nonallopathic lesion of pelvic region, not elsewhere classified; Nonallopathic lesion of upper extremities, not elsewhere classified; Nonallopathic lesion of abdomen and other sites, not elsewhere classified      predniSONE (DELTASONE) 10 MG tablet Take 10 mg by mouth daily. Associated Diagnoses: Sprain of lumbosacral (joint) (ligament); Sprain of neck;  Thoracic sprain and strain; Nonallopathic lesion of rib cage, not elsewhere classified; Nonallopathic lesion of lower extremities, not elsewhere classified; Nonallopathic lesion of sacral region, not elsewhere classified; Nonallopathic lesion of thoracic region, not elsewhere classified; Nonallopathic lesion of head region, not elsewhere classified; Nonallopathic lesion of cervical region, not elsewhere classified; Nonallopathic lesion of lumbar region, not elsewhere classified; Nonallopathic lesion of pelvic region, not elsewhere classified; Nonallopathic lesion of upper extremities, not elsewhere classified; Nonallopathic lesion of abdomen and other sites, not elsewhere classified      furosemide (LASIX) 40 MG tablet Take 20 mg by mouth daily     Associated Diagnoses: Sprain of lumbosacral (joint) (ligament); Sprain of neck; Thoracic sprain and strain; Nonallopathic lesion of rib cage, not elsewhere classified; Nonallopathic lesion of lower extremities, not elsewhere classified; Nonallopathic lesion of sacral region, not elsewhere classified; Nonallopathic lesion of thoracic region, not elsewhere classified; Nonallopathic lesion of head region, not elsewhere classified; Nonallopathic lesion of cervical region, not elsewhere classified; Nonallopathic lesion of lumbar region, not elsewhere classified; Nonallopathic lesion of pelvic region, not elsewhere classified; Nonallopathic lesion of upper extremities, not elsewhere classified; Nonallopathic lesion of abdomen and other sites, not elsewhere classified      Zinc Sulfate 66 MG TABS Take 1 tablet by mouth daily      VITAMIN D PO Take 1 tablet by mouth daily      Alendronate Sodium 70 MG TBEF Take by mouth once a week       enalapril (VASOTEC) 2.5 MG tablet Take 2.5 mg by mouth daily. Associated Diagnoses: Sprain of lumbosacral (joint) (ligament); Sprain of neck;  Thoracic sprain and strain; Nonallopathic lesion of rib cage, not elsewhere classified; Nonallopathic lesion of lower extremities, not elsewhere classified; Nonallopathic lesion of sacral region, not elsewhere classified; Nonallopathic lesion of thoracic region, not elsewhere classified; Nonallopathic lesion of head region, not elsewhere classified; Nonallopathic lesion of cervical region, not elsewhere classified; Nonallopathic lesion of lumbar region, not elsewhere classified; Nonallopathic lesion of pelvic region, not elsewhere classified; Nonallopathic lesion of upper extremities, not elsewhere classified; Nonallopathic lesion of abdomen and other sites, not elsewhere classified             ALLERGIES     Patient is has No Known Allergies. Patients   There is no immunization history on file for this patient. FAMILY HISTORY     Patient's family history includes Cancer (age of onset: 61) in his mother; Diabetes in his brother and sister; Heart Failure in his father. SOCIAL HISTORY     Patient  reports that he has never smoked. He has never used smokeless tobacco. He reports previous alcohol use of about 1.0 standard drinks of alcohol per week. He reports that he does not use drugs. PHYSICAL EXAM     ED TRIAGE VITALS  BP: (!) 141/81, Temp: 96.9 °F (36.1 °C), Pulse: 77, Resp: 16, SpO2: 98 %,Estimated body mass index is 27.41 kg/m² as calculated from the following:    Height as of this encounter: 5' 10\" (1.778 m). Weight as of this encounter: 191 lb (86.6 kg). ,No LMP for male patient. Physical Exam  Vitals and nursing note reviewed. Constitutional:       General: He is not in acute distress. Appearance: He is well-developed. He is not ill-appearing, toxic-appearing or diaphoretic. HENT:      Head: Normocephalic. Right Ear: Hearing, tympanic membrane, ear canal and external ear normal. Tympanic membrane is not erythematous. Left Ear: Hearing, tympanic membrane, ear canal and external ear normal. Tympanic membrane is not erythematous. Nose: No congestion or rhinorrhea. Right Turbinates: Not enlarged or swollen. Left Turbinates: Not enlarged or swollen. Mouth/Throat:      Lips: Pink. Mouth: Mucous membranes are moist.      Tongue: No lesions. Pharynx: Oropharynx is clear. Uvula midline. No pharyngeal swelling, oropharyngeal exudate, posterior oropharyngeal erythema or uvula swelling. Tonsils: No tonsillar exudate or tonsillar abscesses.    Eyes: Conjunctiva/sclera: Conjunctivae normal.      Pupils: Pupils are equal, round, and reactive to light. Cardiovascular:      Rate and Rhythm: Normal rate and regular rhythm. Heart sounds: Normal heart sounds, S1 normal and S2 normal.   Pulmonary:      Effort: Pulmonary effort is normal. No accessory muscle usage. Breath sounds: Normal breath sounds. No decreased air movement. No decreased breath sounds, wheezing, rhonchi or rales. Abdominal:      General: Bowel sounds are normal.      Palpations: Abdomen is soft. There is no shifting dullness, fluid wave, hepatomegaly, splenomegaly, mass or pulsatile mass. Tenderness: There is generalized abdominal tenderness and tenderness in the periumbilical area. There is no right CVA tenderness, left CVA tenderness, guarding or rebound. Negative signs include Renae's sign, Rovsing's sign, McBurney's sign and psoas sign. Musculoskeletal:      Cervical back: Full passive range of motion without pain, normal range of motion and neck supple. No rigidity. Lymphadenopathy:      Head:      Right side of head: No submental, submandibular, tonsillar, preauricular, posterior auricular or occipital adenopathy. Left side of head: No submental, submandibular, tonsillar, preauricular, posterior auricular or occipital adenopathy. Cervical: No cervical adenopathy. Right cervical: No superficial, deep or posterior cervical adenopathy. Left cervical: No superficial, deep or posterior cervical adenopathy. Skin:     General: Skin is warm and dry. Capillary Refill: Capillary refill takes less than 2 seconds. Neurological:      General: No focal deficit present. Mental Status: He is alert and oriented to person, place, and time. Psychiatric:         Mood and Affect: Mood normal.         Speech: Speech normal.         Behavior: Behavior normal. Behavior is cooperative.          DIAGNOSTIC RESULTS     Labs:  Results for orders placed or performed during the hospital encounter of 10/04/21   POC Basic Metabol Panel without Calcium   Result Value Ref Range    Sodium, Whole Blood 141 138 - 146 meq/l    Potassium, Whole Blood 4.9 3.5 - 4.9 meq/l    Chloride, Whole Blood 101 98 - 109 meq/l    CO2, WHOLE BLOOD 28 23 - 33 meq/l    Glucose, Whole Blood 106 70 - 108 mg/dl    BUN WHOLE BLOOD, UC 16 8 - 26 mg/dl    CREATININE WHOLE BLOOD, UC 0.9 0.5 - 1.2 mg/dl   CBC Auto Differential   Result Value Ref Range    WBC 8.7 4.8 - 10.8 thou/mm3    RBC 4.68 (L) 4.70 - 6.10 mill/mm3    Hemoglobin 14.5 14.0 - 18.0 gm/dl    Hematocrit 44.0 42.0 - 52.0 %    MCV 94 80 - 94 fL    MCH 31.0 27.0 - 31.0 pg    MCHC 33.0 33.0 - 37.0 gm/dl    RDW 14.6 (H) 11.5 - 14.5 %    Platelets 964 324 - 387 thou/mm3    MPV 9.7 7.4 - 10.4 fL   Glomerular Filtration Rate, Estimated   Result Value Ref Range    GFR, Estimated > 90 ml/min/1.73m2       IMAGING:    No orders to display         EKG:      URGENT CARE COURSE:     Vitals:    10/04/21 1803   BP: (!) 141/81   Pulse: 77   Resp: 16   Temp: 96.9 °F (36.1 °C)   SpO2: 98%   Weight: 191 lb (86.6 kg)   Height: 5' 10\" (1.778 m)       Medications   dicyclomine (BENTYL) capsule 20 mg (20 mg Oral Given 10/4/21 1824)            PROCEDURES:  None    FINAL IMPRESSION      1. Generalized abdominal pain          DISPOSITION/ PLAN     After reviewing the patients History of Present illness, Vital Signs,Clinical Findings,Comorbities, and Clinical Data obtained I discussed with the patient or patient representative that there is a very real potential for serious injury / illness and the patient will need to be transfer to a level of higher care for further evaluation and continued care. It was explained that this would provide the best care for the patient. The patient/patient representative are agreeable to the treatment plan and are agreeable to be transferred therefore, the patient will be transferred to [facility] ED for reevaluation and further management . Report was called to the accepting facility and given to [facility] RN who accepted the patients transfer. Patient was transferred from the Urgent Care in stable condition by POV .       PATIENT REFERRED TO:  DO Bethany Anaya [facility] [address] [facility] [place] [address]      DISCHARGE MEDICATIONS:  Current Discharge Medication List          Current Discharge Medication List          Current Discharge Medication List          JENSEN Appiah CNP    (Please note that portions of this note were completed with a voice recognition program. Efforts were made to edit the dictations but occasionally words are mis-transcribed.)           JENSEN Appiah CNP  10/04/21 2314

## 2021-10-04 NOTE — ED NOTES
[facility] calls report to Kaylie knight at [facility]     Kulwant Shetty, EMERALD  10/04/21 4635

## 2021-10-04 NOTE — ED TRIAGE NOTES
Pt to the ED with c/o abd pain. Pt states this started today around 1430. Pt states this was suddenly. Pt states this come and goes.

## 2021-10-05 LAB
EKG ATRIAL RATE: 68 BPM
EKG P AXIS: 61 DEGREES
EKG P-R INTERVAL: 162 MS
EKG Q-T INTERVAL: 412 MS
EKG QRS DURATION: 96 MS
EKG QTC CALCULATION (BAZETT): 438 MS
EKG R AXIS: -27 DEGREES
EKG T AXIS: 15 DEGREES
EKG VENTRICULAR RATE: 68 BPM

## 2021-10-05 NOTE — ED PROVIDER NOTES
[place] ENCOUNTER          Pt Name: Farideh Helms  MRN: [id number]  [place] 1965  Date of evaluation: 10/4/2021  Treating Resident Physician: Alyssa Martinez MD  Supervising Physician: Alex Langley MD    CHIEF COMPLAINT       Chief Complaint   Patient presents with    Abdominal Pain     mid  noraml bowel movement today  no problem with urination     History obtained from the patient. HISTORY OF PRESENT ILLNESS    HPI  Farideh Helms is a 64 y.o. male who presents to the emergency department for evaluation of abdominal pain. Patient is here for complaint of abdominal pain starting at 1430 today. Patient states he had a large approximately 12:30 AM salad at cereal and then shortly after started having cramping to upper abdomen. Patient denies any nausea vomiting fever or diarrhea. Patient had last bowel movement this morning. Patient does have a history of kidney transplant secondary to dysfunction after non-Hodgkin's lymphoma there are no modifying factors. The patient has no other acute complaints at this time. REVIEW OF SYSTEMS   Review of Systems   Constitutional: Negative for fatigue and fever. HENT: Negative for congestion, ear pain, rhinorrhea and sore throat. Eyes: Negative for pain and discharge. Respiratory: Negative for cough, shortness of breath and wheezing. Cardiovascular: Negative for chest pain, palpitations and leg swelling. Gastrointestinal: Positive for abdominal pain. Negative for abdominal distention, diarrhea, nausea and vomiting. Genitourinary: Negative for difficulty urinating, flank pain and frequency. Musculoskeletal: Negative for arthralgias. Neurological: Negative for dizziness, tremors, syncope, weakness and numbness.          PAST MEDICAL AND SURGICAL HISTORY     Past Medical History:   Diagnosis Date    [facility]A [facility]cerebral vascular accident) ([facility] [address]     with chemo - residual numbness in left hand    Hematoma     right leg s/p fall    Hemodialysis patient ([facility] [address]     Hx of blood clots     Arm    Hyperlipidemia     Hypertension     Non Hodgkin's lymphoma ([facility] [address]     1995/96    Renal transplant recipient     [facility]     Past Surgical History:   Procedure Laterality Date    APPENDECTOMY      1972    BONE MARROW TRANSPLANT  1996    FACIAL SURGERY N/A 10/28/2020    WIDER EXCISION MELANOMA VERTEX OF SCALP performed by Ac Swartz MD at [address]  06/06/2000         MEDICATIONS   No current facility-administered medications for this encounter. Current Outpatient Medications:     calcium carbonate (TUMS) 500 MG chewable tablet, Take 1 tablet by mouth daily, Disp: , Rfl:     bismuth subsalicylate (PEPTO BISMOL) 262 MG/15ML suspension, Take 15 mLs by mouth every 6 hours as needed for Indigestion, Disp: , Rfl:     mycophenolate (CELLCEPT) 250 MG capsule, Take 1,000 mg by mouth 2 times daily, Disp: , Rfl:     Misc Natural Products (GLUCOSAMINE CHOND COMPLEX/MSM PO), Take  by mouth., Disp: , Rfl:     Ascorbic Acid (VITAMIN C) 500 MG tablet, Take 1,000 mg by mouth daily. , Disp: , Rfl:     predniSONE (DELTASONE) 10 MG tablet, Take 10 mg by mouth daily. , Disp: , Rfl:     furosemide (LASIX) 40 MG tablet, Take 20 mg by mouth daily , Disp: , Rfl:     Zinc Sulfate 66 MG TABS, Take 1 tablet by mouth daily, Disp: , Rfl:     VITAMIN D PO, Take 1 tablet by mouth daily, Disp: , Rfl:     Alendronate Sodium 70 MG TBEF, Take by mouth once a week , Disp: , Rfl:     enalapril (VASOTEC) 2.5 MG tablet, Take 2.5 mg by mouth daily. , Disp: , Rfl:       SOCIAL HISTORY     Social History     Social History Narrative    Not on file     Social History     Tobacco Use    Smoking status: Never Smoker    Smokeless tobacco: Never Used   Vaping Use    Vaping Use: Never used   Substance Use Topics    Alcohol use: Not Currently     Alcohol/week: 1.0 standard drinks     Types: 1 Shots of liquor per week     Comment: very rare use.  Drug use: No         ALLERGIES   No Known Allergies      FAMILY HISTORY     Family History   Problem Relation Age of Onset    Heart Failure Father         heart attack w/stents    Diabetes Sister         5 years ago diagnosed    Diabetes Brother         5 years ago diagnosed   [facility] Mother 61        colon - is now           PREVIOUS RECORDS   Previous records reviewed: N/A    PHYSICAL EXAM     ED Triage Vitals [10/04/21 1803]   BP Temp Temp src Pulse Resp SpO2 Height Weight   (!) 141/81 96.9 °F (36.1 °C) -- 77 16 98 % 5' 10\" (1.778 m) 191 lb (86.6 kg)     Initial vital signs and nursing assessment reviewed and normal. Body mass index is 27.41 kg/m². Pulsoximetry is normal per my interpretation. Additional Vital Signs:  Vitals:    10/04/21 2233   BP: 132/81   Pulse: 77   Resp: 16   Temp:    SpO2: 99%       Physical Exam  Constitutional:       Appearance: Normal appearance. HENT:      Head: Normocephalic. Right Ear: External ear normal.      Left Ear: External ear normal.      Nose: Nose normal.      Mouth/Throat:      Mouth: Mucous membranes are moist.      Pharynx: Oropharynx is clear. Eyes:      Conjunctiva/sclera: Conjunctivae normal.      Pupils: Pupils are equal, round, and reactive to light. Cardiovascular:      Rate and Rhythm: Normal rate and regular rhythm. Pulses: Normal pulses. Heart sounds: Normal heart sounds. Pulmonary:      Effort: Pulmonary effort is normal.      Breath sounds: Normal breath sounds. Abdominal:      General: Bowel sounds are normal.      Palpations: Abdomen is soft. Musculoskeletal:         General: Normal range of motion. Cervical back: Normal range of motion and neck supple. Skin:     General: Skin is warm and dry. Capillary Refill: Capillary refill takes less than 2 seconds. Neurological:      General: No focal deficit present.       Mental Status: He is alert. MEDICAL DECISION MAKING   Initial Assessment:   Patient is a 51-year-old male with complaint of abdominal pain. Patient complained of cramping to epigastric area and right upper quadrant today after eating a meal.  Patient did not have any nausea or vomiting. Patient exam showed mild tenderness to right upper quadrant. Ultrasound of right upper quadrant noted sludge. Patient does have a history of a renal transplant but does not have any pain to either CVA or right lower quadrant where her transplant was placed. Patient's diagnostic labs showed no acute abnormality at this time. Discussed findings with patient and I discussed risk and benefits of CT imaging for additional study at this time. With shared decision making patient opted not to do CT of abdomen at this time. Patient frontal diagnosis includes but is not limited to gastritis, cholelithiasis, cholecystitis, complications related to renal transplant, pancreatitis, GERD and dehydration. Patient will be discharged home at this time with diagnosis of abdominal pain and biliary colic. Plan:   Patient will be discharged home at this time with instruction to follow-up with PCP next business day. ED RESULTS   Laboratory results:  Labs Reviewed   CBC WITH AUTO DIFFERENTIAL - Abnormal; Notable for the following components:       Result Value    RBC 4.68 (*)     RDW 14.6 (*)     All other components within normal limits   GLOMERULAR FILTRATION RATE, ESTIMATED - Abnormal; Notable for the following components:    Est, Glom Filt Rate 87 (*)     All other components within normal limits   POC BASIC METABOL PANEL W/O CALCIUM   DIFFERENTIAL   GLOMERULAR FILTRATION RATE, ESTIMATED   COMPREHENSIVE METABOLIC PANEL W/ REFLEX TO MG FOR LOW K   TROPONIN   LIPASE   ANION GAP   OSMOLALITY       Radiologic studies results:  US GALLBLADDER RUQ   Final Result   1.  Multiple polyps or adherent sludge balls are noted in association with reviewed and agree with the findings and plan as documented in her note unless described otherwise below. Pt presents to the ER with abd pain, mostly RUQ. Medicated here, feeling better, tolerating PO, vss.  Pt's abd exam benign, nonsurgical.  Pt stable for dc home, counseled regarding diet modification, need for f/u and ER return precautions.     Electronically signed by Thania Casey MD on 10/5/21 at 11:25 PM EDT         Shannon Melvin MD  10/05/21 3284

## 2021-10-07 ENCOUNTER — OFFICE VISIT (OUTPATIENT)
Dept: SURGERY | Age: 56
End: 2021-10-07
Payer: COMMERCIAL

## 2021-10-07 VITALS
BODY MASS INDEX: 27.64 KG/M2 | WEIGHT: 193.1 LBS | DIASTOLIC BLOOD PRESSURE: 60 MMHG | HEIGHT: 70 IN | SYSTOLIC BLOOD PRESSURE: 134 MMHG | RESPIRATION RATE: 18 BRPM | OXYGEN SATURATION: 93 % | TEMPERATURE: 96.4 F | HEART RATE: 67 BPM

## 2021-10-07 DIAGNOSIS — R10.84 GENERALIZED ABDOMINAL PAIN: ICD-10-CM

## 2021-10-07 DIAGNOSIS — R94.31 ABNORMAL EKG: Primary | ICD-10-CM

## 2021-10-07 DIAGNOSIS — K82.8 GALLBLADDER SLUDGE: ICD-10-CM

## 2021-10-07 DIAGNOSIS — K80.50 BILIARY COLIC SYMPTOM: ICD-10-CM

## 2021-10-07 DIAGNOSIS — Z01.818 PREOPERATIVE CLEARANCE: ICD-10-CM

## 2021-10-07 PROCEDURE — 99203 OFFICE O/P NEW LOW 30 MIN: CPT | Performed by: SURGERY

## 2021-10-07 ASSESSMENT — ENCOUNTER SYMPTOMS
SORE THROAT: 0
EYE DISCHARGE: 0
APNEA: 0
EYES NEGATIVE: 1
GASTROINTESTINAL NEGATIVE: 1
BACK PAIN: 0
CHEST TIGHTNESS: 0
EYE ITCHING: 0
PHOTOPHOBIA: 0
RESPIRATORY NEGATIVE: 1
WHEEZING: 0
SINUS PAIN: 0
RHINORRHEA: 0
COLOR CHANGE: 0
TROUBLE SWALLOWING: 0
CHOKING: 0
COUGH: 0
EYE REDNESS: 0
SINUS PRESSURE: 0
FACIAL SWELLING: 0
SHORTNESS OF BREATH: 0
ALLERGIC/IMMUNOLOGIC NEGATIVE: 1
STRIDOR: 0
EYE PAIN: 0
VOICE CHANGE: 0

## 2021-10-07 NOTE — PROGRESS NOTES
[address] [place] [address] [address] [facility] [address]  Dept: [phone]  Dept Fax: [phone]  Loc: [phone]    Visit Date: 10/7/2021    Clovis Pressley is a 64 y.o. male who presentstoday for:  Chief Complaint   Patient presents with    Surgical Consult     New patient ER FU 10/4-- mid abd pain, gallbladder sludge       HPI:     HPI 80-year-old white male who has had a challenging medical history that included non-Hodgkin's lymphoma 25 years ago and underwent chemotherapy for same he also states he had a brief treatment of radiation to the chest allowing that the patient developed renal failure uncertain if it may have been related to the lymphoma or some of the treatment but nonetheless then underwent a CAPD catheter treatment for peritoneal dialysis then in June 2000 had a renal transplant living related donor from his sister patient overall has been doing well with all the above and also had melanoma of the scalp excised patient has been having some epigastric and right upper quadrant pain off and on with brief attacks but then recently had a significant tach that lasted for 2-1/2 hours and went to the emergency room and ultrasound was performed showing either sludge balls polyps and sludge in the gallbladder and has been referred for evaluation he states he has a large family and has at least 1 sisters had her gallbladder removed patient also had a previous appendectomy in 1972 his mother did have colon carcinoma he is due for colonoscopy his last one was approximately 6 years ago he follows with the GI service    Past Medical History:   Diagnosis Date    CVA (cerebral vascular accident) ([address]     with chemo - residual numbness in left hand    Hematoma     right leg s/p fall    Hx of blood clots     Arm    Hyperlipidemia     Hypertension     Non Hodgkin's lymphoma ([address]     1995/96    Renal transplant recipient     [facility] transplant follows-KAMLESH Figueroa      Past Surgical History:   Procedure Laterality Date    APPENDECTOMY          BONE MARROW TRANSPLANT      [facility]    COLONOSCOPY      FACIAL SURGERY N/A 10/28/2020    WIDER EXCISION MELANOMA VERTEX OF SCALP performed by Jerson Belcher MD at [address]  2000        Family History   Problem Relation Age of Onset   [facility] Mother 61        colon - is now      Heart Failure Father         heart attack w/stents    Diabetes Sister         5 years ago diagnosed    Diabetes Brother         5 years ago diagnosed        Social History     Tobacco Use    Smoking status: Never Smoker    Smokeless tobacco: Never Used   Substance Use Topics    Alcohol use: Not Currently     Alcohol/week: 1.0 standard drinks     Types: 1 Shots of liquor per week     Comment: very rare use. Current Outpatient Medications   Medication Sig Dispense Refill    mycophenolate (CELLCEPT) 250 MG capsule Take 1,000 mg by mouth 2 times daily      Misc Natural Products (GLUCOSAMINE CHOND COMPLEX/MSM PO) Take  by mouth.  Ascorbic Acid (VITAMIN C) 500 MG tablet Take 1,000 mg by mouth daily.  predniSONE (DELTASONE) 10 MG tablet Take 10 mg by mouth daily.  furosemide (LASIX) 40 MG tablet Take 20 mg by mouth daily        No current facility-administered medications for this visit. No Known Allergies    Subjective:      Review of Systems   Constitutional: Negative. Negative for activity change, appetite change, chills, diaphoresis, fatigue, fever and unexpected weight change. HENT: Negative. Negative for congestion, dental problem, drooling, ear discharge, ear pain, facial swelling, hearing loss, mouth sores, nosebleeds, postnasal drip, rhinorrhea, sinus pressure, sinus pain, sneezing, sore throat, tinnitus, trouble swallowing and voice change. Eyes: Negative.   Negative for photophobia, pain, discharge, redness, itching and visual disturbance. Respiratory: Negative. Negative for apnea, cough, choking, chest tightness, shortness of breath, wheezing and stridor. Cardiovascular: Negative. Negative for chest pain, palpitations and leg swelling. Gastrointestinal: Negative. Endocrine: Negative. Negative for cold intolerance, heat intolerance, polydipsia, polyphagia and polyuria. Genitourinary: Negative. Negative for decreased urine volume, difficulty urinating, discharge, dysuria, enuresis, flank pain, frequency, genital sores, hematuria, penile pain, penile swelling, scrotal swelling, testicular pain and urgency. Musculoskeletal: Negative. Negative for arthralgias, back pain, gait problem, joint swelling, myalgias, neck pain and neck stiffness. Skin: Negative for color change, pallor, rash and wound. Allergic/Immunologic: Negative. Negative for environmental allergies, food allergies and immunocompromised state. Neurological: Negative. Negative for dizziness, tremors, seizures, syncope, facial asymmetry, speech difficulty, weakness, light-headedness, numbness and headaches. Hematological: Negative. Negative for adenopathy. Does not bruise/bleed easily. Psychiatric/Behavioral: Negative. Negative for agitation, behavioral problems, confusion, decreased concentration, dysphoric mood, hallucinations, self-injury, sleep disturbance and suicidal ideas. The patient is not nervous/anxious and is not hyperactive. Objective:   /60 (Site: Right Upper Arm, Position: Sitting, Cuff Size: Medium Adult)   Pulse 67   Temp 96.4 °F (35.8 °C) (Temporal)   Resp 18   Ht 5' 10\" (1.778 m)   Wt 193 lb 1.6 oz (87.6 kg)   SpO2 93%   BMI 27.71 kg/m²     Physical Exam  Constitutional:       Appearance: He is well-developed. HENT:      Head: Normocephalic and atraumatic. Eyes:      General: No scleral icterus. Left eye: No discharge.       Conjunctiva/sclera: Conjunctivae normal. Pupils: Pupils are equal, round, and reactive to light. Neck:      Thyroid: No thyromegaly. Trachea: No tracheal deviation. Cardiovascular:      Rate and Rhythm: Normal rate and regular rhythm. Heart sounds: Normal heart sounds. No murmur heard. No friction rub. No gallop. Pulmonary:      Effort: Pulmonary effort is normal. No respiratory distress. Breath sounds: Normal breath sounds. No wheezing or rales. Chest:      Chest wall: No tenderness. Abdominal:       Musculoskeletal:         General: No tenderness. Normal range of motion. Lymphadenopathy:      Cervical: No cervical adenopathy. Skin:     General: Skin is warm and dry. Coloration: Skin is not pale. Findings: No erythema or rash. Neurological:      Mental Status: He is alert and oriented to person, place, and time. Psychiatric:         Behavior: Behavior normal.         Thought Content:  Thought content normal.         Judgment: Judgment normal.            Results for orders placed or performed during the hospital encounter of 10/04/21   POC Basic Metabol Panel without Calcium   Result Value Ref Range    Sodium, Whole Blood 141 138 - 146 meq/l    Potassium, Whole Blood 4.9 3.5 - 4.9 meq/l    Chloride, Whole Blood 101 98 - 109 meq/l    CO2, WHOLE BLOOD 28 23 - 33 meq/l    Glucose, Whole Blood 106 70 - 108 mg/dl    BUN WHOLE BLOOD, UC 16 8 - 26 mg/dl    CREATININE WHOLE BLOOD, UC 0.9 0.5 - 1.2 mg/dl   CBC Auto Differential   Result Value Ref Range    WBC 8.7 4.8 - 10.8 thou/mm3    RBC 4.68 (L) 4.70 - 6.10 mill/mm3    Hemoglobin 14.5 14.0 - 18.0 gm/dl    Hematocrit 44.0 42.0 - 52.0 %    MCV 94 80 - 94 fL    MCH 31.0 27.0 - 31.0 pg    MCHC 33.0 33.0 - 37.0 gm/dl    RDW 14.6 (H) 11.5 - 14.5 %    Platelets 872 734 - 855 thou/mm3    MPV 9.7 7.4 - 10.4 fL   Differential   Result Value Ref Range    Seg Neutrophils 58.1 %    Lymphocytes 32.9 %    Monocytes 8.2 %    Eosinophils 0.5 %    Basophils 0.2 %    Immature polyp that might be 1 cm in size or certainly could be a sludge ball we discussed gallbladder symptoms we then discussed the laparoscopic removal of the gallbladder my opinion is that it would be reasonable to get the gallbladder out likely the source of his pain and we discussed the laparoscopic approach and the need for open secondary to bleeding or bile duct injury he voices understanding and would like to proceed    Plan:     1. Schedule Ousmane for laparoscopic cholecystectomy possible open  2. The risks, benefits and alternatives were discussed with Nancy Marcos. He understands and wishes to proceed with surgical intervention. 3. Restrictions discussed with Nancy Marcos and he expresses understanding. 4. He is advised to call back directly if there are further questions/concerns, or if his symptoms worsen prior to surgery.             Electronicallysigned by Christopher Adams MD on 10/7/2021 at 3:49 PM

## 2021-10-11 ENCOUNTER — OFFICE VISIT (OUTPATIENT)
Dept: CARDIOLOGY CLINIC | Age: 56
End: 2021-10-11
Payer: COMMERCIAL

## 2021-10-11 VITALS
HEART RATE: 78 BPM | WEIGHT: 191.2 LBS | DIASTOLIC BLOOD PRESSURE: 64 MMHG | HEIGHT: 70 IN | BODY MASS INDEX: 27.37 KG/M2 | SYSTOLIC BLOOD PRESSURE: 118 MMHG

## 2021-10-11 DIAGNOSIS — Z01.818 PRE-OP EVALUATION: ICD-10-CM

## 2021-10-11 DIAGNOSIS — R94.31 ABNORMAL ECG: Primary | ICD-10-CM

## 2021-10-11 PROCEDURE — 99204 OFFICE O/P NEW MOD 45 MIN: CPT | Performed by: NUCLEAR MEDICINE

## 2021-10-11 PROCEDURE — 93000 ELECTROCARDIOGRAM COMPLETE: CPT | Performed by: NUCLEAR MEDICINE

## 2021-10-11 ASSESSMENT — ENCOUNTER SYMPTOMS
PHOTOPHOBIA: 0
DIARRHEA: 0
ABDOMINAL DISTENTION: 0
CONSTIPATION: 0
SHORTNESS OF BREATH: 0
COLOR CHANGE: 0
VOMITING: 0
ABDOMINAL PAIN: 0
RECTAL PAIN: 0
NAUSEA: 0
BLOOD IN STOOL: 0
ANAL BLEEDING: 0
CHEST TIGHTNESS: 0
BACK PAIN: 0

## 2021-10-11 NOTE — PROGRESS NOTES
status: Never Smoker    Smokeless tobacco: Never Used   Substance Use Topics    Alcohol use: Not Currently     Alcohol/week: 1.0 standard drinks     Types: 1 Shots of liquor per week     Comment: very rare use. Current Outpatient Medications   Medication Sig Dispense Refill    mycophenolate (CELLCEPT) 250 MG capsule Take 1,000 mg by mouth 2 times daily      Misc Natural Products (GLUCOSAMINE CHOND COMPLEX/MSM PO) Take  by mouth.  Ascorbic Acid (VITAMIN C) 500 MG tablet Take 1,000 mg by mouth daily.  predniSONE (DELTASONE) 10 MG tablet Take 10 mg by mouth daily.  furosemide (LASIX) 40 MG tablet Take 20 mg by mouth daily        No current facility-administered medications for this visit. No Known Allergies  Health Maintenance   Topic Date Due    Hepatitis C screen  Never done    HIV screen  Never done    DTaP/Tdap/Td vaccine (1 - Tdap) Never done    Lipid screen  Never done    Diabetes screen  Never done    Colon cancer screen colonoscopy  Never done    Pneumococcal 0-64 years Vaccine (2 of 4 - PPSV23) 08/15/2016    Potassium monitoring  10/04/2022    Creatinine monitoring  10/04/2022    Flu vaccine  Completed    Shingles Vaccine  Completed    COVID-19 Vaccine  Completed    Hepatitis A vaccine  Aged Out    Hepatitis B vaccine  Aged Out    Hib vaccine  Aged Out    Meningococcal (ACWY) vaccine  Aged Out       Subjective:  Review of Systems   Constitutional: Positive for fatigue. HENT: Negative for ear pain and mouth sores. Eyes: Negative for photophobia. Respiratory: Negative for chest tightness and shortness of breath. Cardiovascular: Negative for chest pain and palpitations. Gastrointestinal: Negative for abdominal distention, abdominal pain, anal bleeding, blood in stool, constipation, diarrhea, nausea, rectal pain and vomiting. Endocrine: Negative for polyphagia. Genitourinary: Negative for dysuria, hematuria and urgency.    Musculoskeletal: Negative for arthralgias, back pain, gait problem, joint swelling, myalgias, neck pain and neck stiffness. Skin: Negative for color change, pallor, rash and wound. Allergic/Immunologic: Negative for food allergies. Neurological: Negative for dizziness and light-headedness. Psychiatric/Behavioral: Negative for decreased concentration, dysphoric mood and hallucinations. The patient is not nervous/anxious and is not hyperactive. Objective:  Physical Exam  HENT:      Head: Normocephalic. Right Ear: Tympanic membrane normal.      Nose: Nose normal.      Mouth/Throat:      Mouth: Mucous membranes are moist.   Eyes:      Pupils: Pupils are equal, round, and reactive to light. Cardiovascular:      Rate and Rhythm: Normal rate. Heart sounds: Murmur heard. Pulmonary:      Effort: No respiratory distress. Breath sounds: No stridor. No wheezing, rhonchi or rales. Chest:      Chest wall: No tenderness. Abdominal:      General: There is no distension. Palpations: There is no mass. Tenderness: There is no abdominal tenderness. There is no right CVA tenderness, left CVA tenderness, guarding or rebound. Hernia: No hernia is present. Musculoskeletal:         General: No swelling, tenderness, deformity or signs of injury. Cervical back: Normal range of motion. Right lower leg: No edema. Left lower leg: No edema. Skin:     Coloration: Skin is not jaundiced or pale. Findings: No bruising, erythema, lesion or rash. Neurological:      Mental Status: He is alert and oriented to person, place, and time. Cranial Nerves: No cranial nerve deficit. Sensory: No sensory deficit. Motor: No weakness. Coordination: Coordination normal.      Gait: Gait normal.      Deep Tendon Reflexes: Reflexes normal.   Psychiatric:         Mood and Affect: Mood normal.         Thought Content:  Thought content normal.       /64   Pulse 78   Ht 5' 10\" (1.778 m)   Wt 191 lb 3.2 oz (86.7 kg)   BMI 27.43 kg/m²     Assessment:      Diagnosis Orders   1. Abnormal ECG     2. Pre-op evaluation     as above  No major symptoms   Some risk for CAD  Will likely repeat the ECG   Possible lead placement issues   Repeat ECg with septal infarct   No known CAD   Plan:  No follow-ups on file. Discussed at length   Repeat ECG with septal infarct poor R wave progression  Non invasive cardiac testing will be ordered to further evaluate for any ischemic or structural heart disease as a cause of the patient symptoms. We will proceed with a Stress Cardiolite test and echo soon. Continue risk factor modification and medical management  Thank you for allowing me to participate in the care of your patient. Please don't hesitate to contact me regarding any further issues related to the patient care    Orders Placed:  No orders of the defined types were placed in this encounter. Medications Prescribed:  No orders of the defined types were placed in this encounter. Discussed use, benefit, and side effects of prescribed medications. All patient questions answered. Pt voicedunderstanding. Instructed to continue current medications, diet and exercise. Continue risk factor modification and medical management. Patient agreed with treatment plan. Follow up as directed.     Electronically signedby Christine Ordoñez MD on 10/11/2021 at 8:34 AM

## 2021-10-21 ENCOUNTER — HOSPITAL ENCOUNTER (OUTPATIENT)
Dept: NON INVASIVE DIAGNOSTICS | Age: 56
Discharge: HOME | End: 2021-10-21
Payer: COMMERCIAL

## 2021-10-21 VITALS — HEIGHT: 70 IN | WEIGHT: 191 LBS | BODY MASS INDEX: 27.35 KG/M2

## 2021-10-21 DIAGNOSIS — Z01.818 PRE-OP EVALUATION: ICD-10-CM

## 2021-10-21 DIAGNOSIS — R94.31 ABNORMAL ECG: ICD-10-CM

## 2021-10-21 LAB
LV EF: 58 %
LV EF: 60 %
LVEF MODALITY: NORMAL
LVEF MODALITY: NORMAL

## 2021-10-21 PROCEDURE — 93306 TTE W/DOPPLER COMPLETE: CPT

## 2021-10-21 PROCEDURE — 3430000000 HC RX DIAGNOSTIC RADIOPHARMACEUTICAL: Performed by: NUCLEAR MEDICINE

## 2021-10-21 PROCEDURE — 78452 HT MUSCLE IMAGE SPECT MULT: CPT

## 2021-10-21 PROCEDURE — 93017 CV STRESS TEST TRACING ONLY: CPT | Performed by: NUCLEAR MEDICINE

## 2021-10-21 PROCEDURE — A9500 TC99M SESTAMIBI: HCPCS | Performed by: NUCLEAR MEDICINE

## 2021-10-21 RX ADMIN — Medication 32.9 MILLICURIE: at 15:10

## 2021-10-21 RX ADMIN — Medication 9.3 MILLICURIE: at 14:05

## 2021-10-22 ENCOUNTER — TELEPHONE (OUTPATIENT)
Dept: CARDIOLOGY CLINIC | Age: 56
End: 2021-10-22

## 2021-10-25 ENCOUNTER — TELEPHONE (OUTPATIENT)
Dept: SURGERY | Age: 56
End: 2021-10-25

## 2021-10-25 NOTE — TELEPHONE ENCOUNTER
[address]    Phone * [phone]   Surgical Scheduling Direct line Phone * [phone]  Fax * [phone]      Quirino Puneet      1965    male    [address] [address]   Marital Status:         Home Phone: [phone]   Cell Phone:   Telephone Information:   Mobile [phone]              Surgeon: Dr. Jean Pierre Platt  Surgery Date:11-   Time: 8:00am     Procedure: Laparoscopic cholecystectomy possible open    Outpatient     Diagnosis: Gallbladder sludge/ Biliary colic    Important Medical History: In Epic    Special Inst/Equip:     CPT Codes: 56258    Latex Allergy:   no Cardiac Device:  no    Anesthesia Type: General    Case Location:  Main OR     Preadmission Testing: Phone Call      PAT Date and Time: ________________________________    PAT Confirmation #: _________________________________    Post Op Visit:  ______________________________________    Need Preop Cardiac Clearance:   yes,     Does patient have Cardiologist/physician?  Dr. Salvatore Colbert #:  ______________________________________________    [address]: __________________________________ Date:____________________        Rashi's Name:  [employer]

## 2021-10-26 ENCOUNTER — PREP FOR PROCEDURE (OUTPATIENT)
Dept: SURGERY | Age: 56
End: 2021-10-26

## 2021-10-26 RX ORDER — SODIUM CHLORIDE 9 MG/ML
INJECTION, SOLUTION INTRAVENOUS CONTINUOUS
Status: CANCELLED | OUTPATIENT
Start: 2021-10-26

## 2021-10-29 ENCOUNTER — TELEPHONE (OUTPATIENT)
Dept: SURGERY | Age: 56
End: 2021-10-29

## 2021-10-29 NOTE — TELEPHONE ENCOUNTER
Per Eve Jurado at [facility] no prior authorization required for CPT code 37271.   Call Ref# [id number] 10- at 3:51pm

## 2021-11-18 ENCOUNTER — ANESTHESIA EVENT (OUTPATIENT)
Dept: OPERATING ROOM | Age: 56
End: 2021-11-18
Payer: COMMERCIAL

## 2021-11-19 ENCOUNTER — HOSPITAL ENCOUNTER (OUTPATIENT)
Age: 56
Setting detail: OUTPATIENT SURGERY
Discharge: HOME | End: 2021-11-19
Attending: SURGERY | Admitting: SURGERY
Payer: COMMERCIAL

## 2021-11-19 ENCOUNTER — ANESTHESIA (OUTPATIENT)
Dept: OPERATING ROOM | Age: 56
End: 2021-11-19
Payer: COMMERCIAL

## 2021-11-19 VITALS — OXYGEN SATURATION: 100 % | SYSTOLIC BLOOD PRESSURE: 145 MMHG | TEMPERATURE: 97.3 F | DIASTOLIC BLOOD PRESSURE: 72 MMHG

## 2021-11-19 VITALS
SYSTOLIC BLOOD PRESSURE: 126 MMHG | WEIGHT: 192.4 LBS | HEIGHT: 70 IN | HEART RATE: 82 BPM | RESPIRATION RATE: 18 BRPM | OXYGEN SATURATION: 100 % | DIASTOLIC BLOOD PRESSURE: 71 MMHG | BODY MASS INDEX: 27.54 KG/M2 | TEMPERATURE: 96.3 F

## 2021-11-19 DIAGNOSIS — K80.50 BILIARY COLIC: Primary | ICD-10-CM

## 2021-11-19 LAB — POTASSIUM SERPL-SCNC: 4.3 MEQ/L (ref 3.5–5.2)

## 2021-11-19 PROCEDURE — 2500000003 HC RX 250 WO HCPCS: Performed by: SURGERY

## 2021-11-19 PROCEDURE — 3700000000 HC ANESTHESIA ATTENDED CARE: Performed by: SURGERY

## 2021-11-19 PROCEDURE — 6360000002 HC RX W HCPCS: Performed by: ANESTHESIOLOGY

## 2021-11-19 PROCEDURE — 7100000010 HC PHASE II RECOVERY - FIRST 15 MIN: Performed by: SURGERY

## 2021-11-19 PROCEDURE — 47562 LAPAROSCOPIC CHOLECYSTECTOMY: CPT | Performed by: SURGERY

## 2021-11-19 PROCEDURE — 6370000000 HC RX 637 (ALT 250 FOR IP): Performed by: SURGERY

## 2021-11-19 PROCEDURE — 2500000003 HC RX 250 WO HCPCS

## 2021-11-19 PROCEDURE — 7100000000 HC PACU RECOVERY - FIRST 15 MIN: Performed by: SURGERY

## 2021-11-19 PROCEDURE — 2709999900 HC NON-CHARGEABLE SUPPLY: Performed by: SURGERY

## 2021-11-19 PROCEDURE — 3600000012 HC SURGERY LEVEL 2 ADDTL 15MIN: Performed by: SURGERY

## 2021-11-19 PROCEDURE — 2580000003 HC RX 258

## 2021-11-19 PROCEDURE — 84132 ASSAY OF SERUM POTASSIUM: CPT

## 2021-11-19 PROCEDURE — 7100000001 HC PACU RECOVERY - ADDTL 15 MIN: Performed by: SURGERY

## 2021-11-19 PROCEDURE — 6360000002 HC RX W HCPCS

## 2021-11-19 PROCEDURE — 7100000011 HC PHASE II RECOVERY - ADDTL 15 MIN: Performed by: SURGERY

## 2021-11-19 PROCEDURE — 88304 TISSUE EXAM BY PATHOLOGIST: CPT

## 2021-11-19 PROCEDURE — 3700000001 HC ADD 15 MINUTES (ANESTHESIA): Performed by: SURGERY

## 2021-11-19 PROCEDURE — 36415 COLL VENOUS BLD VENIPUNCTURE: CPT

## 2021-11-19 PROCEDURE — 3600000002 HC SURGERY LEVEL 2 BASE: Performed by: SURGERY

## 2021-11-19 RX ORDER — PROPOFOL 10 MG/ML
INJECTION, EMULSION INTRAVENOUS PRN
Status: DISCONTINUED | OUTPATIENT
Start: 2021-11-19 | End: 2021-11-19 | Stop reason: SDUPTHER

## 2021-11-19 RX ORDER — SODIUM CHLORIDE 9 MG/ML
INJECTION, SOLUTION INTRAVENOUS CONTINUOUS
Status: DISCONTINUED | OUTPATIENT
Start: 2021-11-19 | End: 2021-11-19 | Stop reason: HOSPADM

## 2021-11-19 RX ORDER — ONDANSETRON 2 MG/ML
INJECTION INTRAMUSCULAR; INTRAVENOUS PRN
Status: DISCONTINUED | OUTPATIENT
Start: 2021-11-19 | End: 2021-11-19 | Stop reason: SDUPTHER

## 2021-11-19 RX ORDER — OXYCODONE HYDROCHLORIDE AND ACETAMINOPHEN 5; 325 MG/1; MG/1
1 TABLET ORAL EVERY 6 HOURS PRN
Status: DISCONTINUED | OUTPATIENT
Start: 2021-11-19 | End: 2021-11-19 | Stop reason: HOSPADM

## 2021-11-19 RX ORDER — ACYCLOVIR 200 MG/1
CAPSULE ORAL DAILY
COMMUNITY

## 2021-11-19 RX ORDER — PHENYLEPHRINE HYDROCHLORIDE 10 MG/ML
INJECTION INTRAVENOUS PRN
Status: DISCONTINUED | OUTPATIENT
Start: 2021-11-19 | End: 2021-11-19 | Stop reason: SDUPTHER

## 2021-11-19 RX ORDER — BUPIVACAINE HYDROCHLORIDE 5 MG/ML
INJECTION, SOLUTION EPIDURAL; INTRACAUDAL PRN
Status: DISCONTINUED | OUTPATIENT
Start: 2021-11-19 | End: 2021-11-19 | Stop reason: ALTCHOICE

## 2021-11-19 RX ORDER — LIDOCAINE HCL/PF 100 MG/5ML
SYRINGE (ML) INJECTION PRN
Status: DISCONTINUED | OUTPATIENT
Start: 2021-11-19 | End: 2021-11-19 | Stop reason: SDUPTHER

## 2021-11-19 RX ORDER — LABETALOL 20 MG/4 ML (5 MG/ML) INTRAVENOUS SYRINGE
10 EVERY 10 MIN PRN
Status: DISCONTINUED | OUTPATIENT
Start: 2021-11-19 | End: 2021-11-19 | Stop reason: HOSPADM

## 2021-11-19 RX ORDER — GLYCOPYRROLATE 1 MG/5 ML
SYRINGE (ML) INTRAVENOUS PRN
Status: DISCONTINUED | OUTPATIENT
Start: 2021-11-19 | End: 2021-11-19 | Stop reason: SDUPTHER

## 2021-11-19 RX ORDER — EPHEDRINE SULFATE/0.9% NACL/PF 50 MG/5 ML
SYRINGE (ML) INTRAVENOUS PRN
Status: DISCONTINUED | OUTPATIENT
Start: 2021-11-19 | End: 2021-11-19 | Stop reason: SDUPTHER

## 2021-11-19 RX ORDER — FENTANYL CITRATE 50 UG/ML
INJECTION, SOLUTION INTRAMUSCULAR; INTRAVENOUS PRN
Status: DISCONTINUED | OUTPATIENT
Start: 2021-11-19 | End: 2021-11-19 | Stop reason: SDUPTHER

## 2021-11-19 RX ORDER — NEOSTIGMINE METHYLSULFATE 5 MG/5 ML
SYRINGE (ML) INTRAVENOUS PRN
Status: DISCONTINUED | OUTPATIENT
Start: 2021-11-19 | End: 2021-11-19 | Stop reason: SDUPTHER

## 2021-11-19 RX ORDER — MORPHINE SULFATE 2 MG/ML
2 INJECTION, SOLUTION INTRAMUSCULAR; INTRAVENOUS EVERY 5 MIN PRN
Status: DISCONTINUED | OUTPATIENT
Start: 2021-11-19 | End: 2021-11-19 | Stop reason: HOSPADM

## 2021-11-19 RX ORDER — ROCURONIUM BROMIDE 10 MG/ML
INJECTION, SOLUTION INTRAVENOUS PRN
Status: DISCONTINUED | OUTPATIENT
Start: 2021-11-19 | End: 2021-11-19 | Stop reason: SDUPTHER

## 2021-11-19 RX ORDER — FENTANYL CITRATE 50 UG/ML
50 INJECTION, SOLUTION INTRAMUSCULAR; INTRAVENOUS EVERY 5 MIN PRN
Status: DISCONTINUED | OUTPATIENT
Start: 2021-11-19 | End: 2021-11-19 | Stop reason: HOSPADM

## 2021-11-19 RX ORDER — OXYCODONE HYDROCHLORIDE AND ACETAMINOPHEN 5; 325 MG/1; MG/1
1 TABLET ORAL EVERY 6 HOURS PRN
Qty: 16 TABLET | Refills: 0 | Status: SHIPPED | OUTPATIENT
Start: 2021-11-19 | End: 2021-11-22

## 2021-11-19 RX ADMIN — PHENYLEPHRINE HYDROCHLORIDE 80 MCG: 10 INJECTION INTRAVENOUS at 08:22

## 2021-11-19 RX ADMIN — Medication 0.6 MG: at 08:42

## 2021-11-19 RX ADMIN — MORPHINE SULFATE 2 MG: 2 INJECTION, SOLUTION INTRAMUSCULAR; INTRAVENOUS at 09:09

## 2021-11-19 RX ADMIN — Medication 3 MG: at 08:43

## 2021-11-19 RX ADMIN — FENTANYL CITRATE 100 MCG: 50 INJECTION, SOLUTION INTRAMUSCULAR; INTRAVENOUS at 08:08

## 2021-11-19 RX ADMIN — ONDANSETRON HYDROCHLORIDE 4 MG: 4 INJECTION, SOLUTION INTRAMUSCULAR; INTRAVENOUS at 08:35

## 2021-11-19 RX ADMIN — Medication 10 MG: at 08:24

## 2021-11-19 RX ADMIN — PHENYLEPHRINE HYDROCHLORIDE 200 MCG: 10 INJECTION INTRAVENOUS at 08:36

## 2021-11-19 RX ADMIN — ROCURONIUM BROMIDE 20 MG: 10 INJECTION INTRAVENOUS at 08:24

## 2021-11-19 RX ADMIN — FENTANYL CITRATE 100 MCG: 50 INJECTION, SOLUTION INTRAMUSCULAR; INTRAVENOUS at 08:59

## 2021-11-19 RX ADMIN — Medication 5 MG: at 08:21

## 2021-11-19 RX ADMIN — FENTANYL CITRATE 50 MCG: 0.05 INJECTION, SOLUTION INTRAMUSCULAR; INTRAVENOUS at 09:15

## 2021-11-19 RX ADMIN — ROCURONIUM BROMIDE 30 MG: 10 INJECTION INTRAVENOUS at 08:10

## 2021-11-19 RX ADMIN — FENTANYL CITRATE 50 MCG: 0.05 INJECTION, SOLUTION INTRAMUSCULAR; INTRAVENOUS at 09:20

## 2021-11-19 RX ADMIN — OXYCODONE AND ACETAMINOPHEN 1 TABLET: 5; 325 TABLET ORAL at 09:56

## 2021-11-19 RX ADMIN — Medication 10 MG: at 08:36

## 2021-11-19 RX ADMIN — HYDROCORTISONE SODIUM SUCCINATE 100 MG: 100 INJECTION, POWDER, FOR SOLUTION INTRAMUSCULAR; INTRAVENOUS at 08:18

## 2021-11-19 RX ADMIN — Medication 100 MG: at 08:08

## 2021-11-19 RX ADMIN — SODIUM CHLORIDE: 9 INJECTION, SOLUTION INTRAVENOUS at 07:11

## 2021-11-19 RX ADMIN — PROPOFOL 200 MG: 10 INJECTION, EMULSION INTRAVENOUS at 08:08

## 2021-11-19 ASSESSMENT — PULMONARY FUNCTION TESTS
PIF_VALUE: 18
PIF_VALUE: 10
PIF_VALUE: 15
PIF_VALUE: 14
PIF_VALUE: 0
PIF_VALUE: 2
PIF_VALUE: 20
PIF_VALUE: 14
PIF_VALUE: 14
PIF_VALUE: 15
PIF_VALUE: 16
PIF_VALUE: 14
PIF_VALUE: 12
PIF_VALUE: 14
PIF_VALUE: 19
PIF_VALUE: 2
PIF_VALUE: 14
PIF_VALUE: 16
PIF_VALUE: 19
PIF_VALUE: 2
PIF_VALUE: 20
PIF_VALUE: 9
PIF_VALUE: 15
PIF_VALUE: 20
PIF_VALUE: 15
PIF_VALUE: 20
PIF_VALUE: 2
PIF_VALUE: 2
PIF_VALUE: 14
PIF_VALUE: 14
PIF_VALUE: 20
PIF_VALUE: 10
PIF_VALUE: 20
PIF_VALUE: 20
PIF_VALUE: 19
PIF_VALUE: 17
PIF_VALUE: 21
PIF_VALUE: 15
PIF_VALUE: 14
PIF_VALUE: 14
PIF_VALUE: 18
PIF_VALUE: 19
PIF_VALUE: 16
PIF_VALUE: 2
PIF_VALUE: 16
PIF_VALUE: 15
PIF_VALUE: 20
PIF_VALUE: 12
PIF_VALUE: 12
PIF_VALUE: 21
PIF_VALUE: 14
PIF_VALUE: 20
PIF_VALUE: 16
PIF_VALUE: 65
PIF_VALUE: 20
PIF_VALUE: 2
PIF_VALUE: 14
PIF_VALUE: 16
PIF_VALUE: 20

## 2021-11-19 ASSESSMENT — PAIN SCALES - GENERAL
PAINLEVEL_OUTOF10: 6
PAINLEVEL_OUTOF10: 7
PAINLEVEL_OUTOF10: 0
PAINLEVEL_OUTOF10: 6
PAINLEVEL_OUTOF10: 5
PAINLEVEL_OUTOF10: 9
PAINLEVEL_OUTOF10: 7

## 2021-11-19 ASSESSMENT — PAIN - FUNCTIONAL ASSESSMENT: PAIN_FUNCTIONAL_ASSESSMENT: 0-10

## 2021-11-19 NOTE — ANESTHESIA POSTPROCEDURE EVALUATION
Department of Anesthesiology  Postprocedure Note    Patient: Marvin Wilcox  MRN: [id number]  YOB: 1965  Date of evaluation: 11/19/2021  Time:  12:00 PM     Procedure Summary     Date: 11/19/21 Room / Location: [address] [place] [address]    Anesthesia Start: 0802 Anesthesia Stop: 8107    Procedure: CHOLECYSTECTOMY LAPAROSCOPIC (N/A Abdomen) Diagnosis: (GALLBLADDER SLUDGE, BILIARY COLIC)    Surgeons: Rajinder Mayes MD Responsible Provider: Michelle Bedolla MD    Anesthesia Type: general ASA Status: 3          Anesthesia Type: general    Sweta Phase I: Sweta Score: 9    Sweta Phase II: Sweta Score: 10    Last vitals: Reviewed and per EMR flowsheets.        Anesthesia Post Evaluation    Patient location during evaluation: PACU  Patient participation: complete - patient participated  Level of consciousness: awake  Nausea & Vomiting: no vomiting and no nausea  Complications: no  Cardiovascular status: hemodynamically stable  Respiratory status: acceptable and nasal cannula  Hydration status: stable Observation/Bedded OP  SLP Plan of Care Note    Primary Insurance: [place] MEDICAID  Secondary Insurance: N/A  Note sent for required physician co-signature: Yes       Is the patient currently receiving skilled home care services (RN or therapy) No    DIAGNOSIS: Chest pain                  Therapy Diagnosis: SLP Therapy Dx:Dysphagia, Oropharyngeal Phase    Recommendations:  - PEG feeding with sips of WATER ONLY (per previous recs at facility-- awaiting further information from SLP)  - Constant supervision with p.o. intake, sit fully upright, SMALL sips, NO straws  - D/c intake if any signs of difficulty    ASSESSMENT:   Pt seen in the ED for clinical swallow evaluation. Please note, pt with EXTENSIVE hx dysphagia and previous speech therapy. Per chart review, pt d/c'd from [place] 10/2018 with recs for PEG feeding and oral intake of thin liquids (no solids). Discussed with RN and rehab director at [facility] (SLP not available this date); per report, pt most recently consuming WATER only. Please refer to speech therapy hospital course for further information.     Pt alert, pleasant, and eager for intake; noted to be somewhat labile, laughs inappropriately at times (this is baseline, per report). Attention to task inconsistent; redirection provided as appropriate. Pt oriented to city, month; requires cues to identify place, year. Will monitor com/cog status.     Oral motor examination reveals L facial weakness, decreased ROM and slight lingual deviation to the L upon protrusion.  Motor speech skills judged to be functional; pt expresses wants and follows simple directives appropriately. Assessment completed with ice chips/water. Pt presents with suspected reduced bolus control, possible premature spillage. Pharyngeal swallow appears mildly delayed in initiation. Reduced hyolaryngeal elevation/excursion in ROM suspected. Pt demonstrates delayed dry cough x1 when he took  multiple ice chips at once and cough x1 with  larger sips via straw. Pt/wife also report current baseline cough (with \"phlegm\"), therefore it is difficult to clinically determine if swallow related. No additional overt SS aspiration/penetration noted across ~8 oz water. SpO2 remains in high 90s.     Recommend continue with PEG feeding along with sips water only at this time, per previous recs. Constant supervision warranted; suspect pt with poor safety awareness/impulsivity. Discussed recs with pt, wife, RN, and MD. Will further explore reasoning for current recs and obtain additional information re: current status, speech therapy goals/plan of care (trials solids? additional liquids?) from facility SLP when available. Speech therapy to continue to follow for assessment of diet tolerance, pt/caregiver teaching.        SUBJECTIVE:   Subjective Comments: Pt alert, pleasant, somewhat labile-- laughs inappropriately at times. (12/12/18 1053)           OBJECTIVE:  Swallow Recommendations: Dysphagia treatment(monitor need for/appropriateness of repeat VFSS)  Diet Solids Recommendation: No solids consistencies  Diet Liquids Recommendations: Thin liquids(WATER ONLY per previous recs)  Feeding Guidelines: No straws, Eat slowly, only when alert, Constant supervision, Sit fully upright for all PO intake, Sit up for 30 minutes after meal, Small bites/sips(D/c if any signs of difficulty)       See SLP flowsheets for full details regarding the SLP therapy provided.    EDUCATION:   On this date, the patient, patient's spouse and care partner were educated on the above.    The response to education was: Verbalizes understanding and Needs reinforcement    GOALS:  Short Term Goals to be Reviewed on : 12/19/18 (12/12/18 1055)  STG are the Same as Discharge Goals: Yes (12/12/18 1055)  Goal Agreement: Patient agrees with goals and treatment plan;Family/significant other/caregiver agrees (12/12/18 1055)  Swallowing STG 1: Pt will tolerate thin liquids without complications of  aspiration (12/12/18 1055)  Swallowing Discharge Goal 1: Pt will tolerate least restrictive diet without complications of aspiration (12/12/18 1055)    PLAN:   Continue skilled SLP for Swallow Therapy  Frequency: 0/5 by 12/19/18 swallow (12/12/18 1053)  Plan for Next Session: diet tolerance, contact facility SLP-- reasoning for water only? trials solids? current status/recs?       Treatment Interventions: Swallow Therapy  Amount: 0-30 minutes  Frequency: 0/5 by 12/19/18 swallow  Duration: 5 days    RECOMMENDATIONS FOR DISCHARGE:  Recommendations for Discharge: SLP: tbd    BILLING INFORMATION:    Timed Procedures:   ,  ,  ,      Untimed Procedures:   ,  ,  $ Swallowing and Laryngeal Evaluation: 1 Procedure (12/12/18 1053),  ,  ,  ,  ,  ,  ,  ,  ,  ,  ,  ,  ,  ,      G-Codes:   ,  ,  ,  ,  ,  ,  ,  ,  ,  ,  ,  ,  ,  ,  ,  ,  ,  ,  ,  ,  ,  ,  ,      Total Treatment Time:  SLP Time Spent: 40 min    Timed Treatment Minutes:  OBS Patients Only:  SLP Timed Code TX Minutes: 40 minutes      The co-signature indicates that the physician certifies the need for SLP furnished under this plan of treatment while under his/her care.

## 2021-11-19 NOTE — H&P
[facility]  History and Physical Update      Pt Name: Clovis Pressley  MRN: [id number]  YOB: 1965  Date of evaluation: 11/18/2021    [x] I have examined the patient and reviewed the H&P/Consult and there are no changes to the patient or plans. [] I have examined the patient and reviewed the H&P/Consult and have noted the following changes:         Aquiles Barry MD  Electronically signed 11/18/2021 at 9:17 PM
surgery with wide excision of melanoma. He has had a kidney transplant as mentioned above and colonoscopies. FAMILY HISTORY:  Positive for mother with colon cancer, coronary artery  disease, diabetes. SOCIAL HISTORY:  He is a nondrinker, nonsmoker. He is a social drinker  of alcohol. MEDICATIONS:  CellCept, glucosamine, vitamin C, Deltasone, Lasix. ALLERGIES:  NONE. PHYSICAL EXAMINATION:  GENERAL:  The patient is a 42-year-old white male who appears his age. HEAD, EARS, EYES, NOSE, AND THROAT:  Show no scleral icterus. CARDIOVASCULAR:  S1, S2.  LUNGS:  Respirations are clear. ABDOMEN:  Soft. Incisions are well-healed. EXTREMITIES:  Within normal limits. ASSESSMENT AND PLAN:  Biliary colic with symptoms and sludge. The  patient is being admitted at this time for attempted laparoscopic  cholecystectomy. We did discuss with the patient the risk and need for  open due to his previous peritoneal dialysis and prior surgeries. He  voices understanding and wants to proceed. MEERA MENON [facility], MD    D: 11/18/2021 21:27:51       T: 11/18/2021 21:31:04     [email or web address]  Job#: [id number]     Doc#: [id number]

## 2021-11-19 NOTE — PROGRESS NOTES
Dr Emre Sawyer ok with using home going pain medication prescribed. Pt asking for coffee and jello. Also water and muffin. No drainag from incision sites.

## 2021-11-19 NOTE — ANESTHESIA PRE PROCEDURE
Department of Anesthesiology  Preprocedure Note       Name:  Kwadwo Mondragon   Age:  64 y.o.  :  1965                                          MRN:  [id number]         Date:  2021      Surgeon: Lord Ashton):  Mj Scherer MD    Procedure: Procedure(s):  CHOLECYSTECTOMY LAPAROSCOPIC, POSS OPEN    Medications prior to admission:   Prior to Admission medications    Medication Sig Start Date End Date Taking? Authorizing Provider   acyclovir (ZOVIRAX) 200 MG capsule Take by mouth daily   Yes Historical Provider, MD   mycophenolate (CELLCEPT) 250 MG capsule Take 1,000 mg by mouth 2 times daily   Yes Historical Provider, MD   Misc Natural Products (GLUCOSAMINE CHOND COMPLEX/MSM PO) Take  by mouth. Yes Historical Provider, MD   Ascorbic Acid (VITAMIN C) 500 MG tablet Take 1,000 mg by mouth daily. Yes Historical Provider, MD   predniSONE (DELTASONE) 10 MG tablet Take 10 mg by mouth daily. Yes Historical Provider, MD   furosemide (LASIX) 40 MG tablet Take 20 mg by mouth daily    Yes Historical Provider, MD       Current medications:    Current Facility-Administered Medications   Medication Dose Route Frequency Provider Last Rate Last Admin    0.9 % sodium chloride infusion   IntraVENous Continuous Theresa Deluna  mL/hr at /42 0711 New Bag at 21 0711       Allergies:  No Known Allergies    Problem List:    Patient Active Problem List   Diagnosis Code    Sprain of ligament of lumbosacral joint S33. 9XXA    Sprain of neck S13. 9XXA    Thoracic sprain and strain [id number]    CVA (cerebral vascular accident) ([address] I63.9    Renal transplant recipient Z80.0    History of non-Hodgkin's lymphoma Z85.72    Hyperlipidemia E78.5    Hypertension I10       Past Medical History:        Diagnosis Date    CVA (cerebral vascular accident) ([address]     with chemo - residual numbness in left hand    Hematoma     right leg s/p fall    Hx of blood clots     Arm    Hyperlipidemia     Hypertension     Non Hodgkin's [facility] ([facility]     1995/96    Renal transplant recipient     [facility]       Past Surgical History:        Procedure Laterality Date    APPENDECTOMY      1972    BONE MARROW TRANSPLANT  1996    [facility] transplant once a year    COLONOSCOPY      FACIAL SURGERY N/A 10/28/2020    WIDER EXCISION MELANOMA VERTEX OF SCALP performed by Ines Harrison MD at [address]  06/06/2000    OTHER SURGICAL HISTORY      CAPD catheter insertion    OTHER SURGICAL HISTORY  06/09/2000    CAPD cath removal-Dr Brittaney Malagon       Social History:    Social History     Tobacco Use    Smoking status: Never Smoker    Smokeless tobacco: Never Used   Substance Use Topics    Alcohol use: Not Currently     Alcohol/week: 1.0 standard drink     Types: 1 Shots of liquor per week     Comment: very rare use. Counseling given: Not Answered      Vital Signs (Current):   Vitals:    11/19/21 0642 11/19/21 0653   BP: 108/61 108/61   Pulse: 80 78   Resp: 16 16   Temp: 97.6 °F (36.4 °C) 97.6 °F (36.4 °C)   TempSrc: Temporal Oral   SpO2: 99% 99%   Weight:  192 lb 6.4 oz (87.3 kg)   Height:  5' 10\" (1.778 m)                                              BP Readings from Last 3 Encounters:   11/19/21 108/61   10/11/21 118/64   10/07/21 134/60       NPO Status: Time of last liquid consumption: 2100                        Time of last solid consumption: 2100                        Date of last liquid consumption: 11/18/21                        Date of last solid food consumption: 11/18/21    BMI:   Wt Readings from Last 3 Encounters:   11/19/21 192 lb 6.4 oz (87.3 kg)   10/21/21 191 lb (86.6 kg)   10/11/21 191 lb 3.2 oz (86.7 kg)     Body mass index is 27.61 kg/m².     CBC:   Lab Results   Component Value Date    WBC 8.7 10/04/2021    RBC 4.68 10/04/2021    HGB 14.5 10/04/2021    HCT 44.0 10/04/2021    MCV 94 10/04/2021    RDW 14.6 10/04/2021     10/04/2021       CMP:   Lab Results   Component Value Date     10/04/2021     10/04/2021    K 5.1 10/04/2021    K 4.9 10/04/2021     10/04/2021    CO2 30 10/04/2021    BUN 14 10/04/2021    CREATININE 0.9 10/04/2021    LABGLOM 87 10/04/2021    GLUCOSE 104 10/04/2021    PROT 6.3 10/04/2021    CALCIUM 10.4 10/04/2021    BILITOT 0.3 10/04/2021    ALKPHOS 86 10/04/2021    AST 15 10/04/2021    ALT 16 10/04/2021       POC Tests: No results for input(s): POCGLU, POCNA, POCK, POCCL, POCBUN, POCHEMO, POCHCT in the last 72 hours. Coags:   Lab Results   Component Value Date    INR 1.00 02/19/2016    APTT 29.3 02/19/2016       HCG (If Applicable): No results found for: PREGTESTUR, PREGSERUM, HCG, HCGQUANT     ABGs: No results found for: PHART, PO2ART, JRD6VAR, BPZ1PVG, BEART, T6USRDAJ     Type & Screen (If Applicable):  No results found for: LABABO, LABRH    Drug/Infectious Status (If Applicable):  No results found for: HIV, HEPCAB    COVID-19 Screening (If Applicable):   Lab Results   Component Value Date    COVID19 Not Detected 10/23/2020           Anesthesia Evaluation    Airway: Mallampati: II  TM distance: >3 FB   Neck ROM: full  Mouth opening: > = 3 FB Dental:          Pulmonary: breath sounds clear to auscultation                             Cardiovascular:    (+) hypertension:,         Rhythm: regular                      Neuro/Psych:   (+) CVA:,             GI/Hepatic/Renal:   (+) renal disease:,           Endo/Other:                     Abdominal:             Vascular: Other Findings:             Anesthesia Plan      general     ASA 3     (Past history of non hodgkin's lympoma  S/p nephrectomy, kidney transplant.  numbness left hand fingers)  Induction: intravenous. MIPS: Postoperative opioids intended and Prophylactic antiemetics administered. Anesthetic plan and risks discussed with patient and spouse. Plan discussed with CRNA.                   Nina Lozano Sinai Traylor MD   11/19/2021

## 2021-11-19 NOTE — OP NOTE
[address] [id number]                                OPERATIVE REPORT    PATIENT NAME: Chantal Meléndez               :        1965  MED REC NO:   [id number]                           ROOM:  ACCOUNT NO:   [de-identified]                           ADMIT DATE: 2021  PROVIDER:     Carla Dickens MD    DATE OF PROCEDURE:  2021    PREOPERATIVE DIAGNOSIS:  Biliary colic with sludge. POSTOPERATIVE DIAGNOSIS:  Biliary colic with sludge. OPERATION:  Laparoscopic cholecystectomy. SURGEON:  Carla Mckenzie. MD Mathew    ANESTHESIA:  General.    COMPLICATIONS:  None. BLOOD LOSS:  5 mL. INDICATIONS FOR PROCEDURE:  The patient is a 49-year-old white male who  has had a previous history of renal failure, history of peritoneal  dialysis, and then eventual renal transplant. The patient has been  having symptoms of biliary colic with ultrasound showing sludge and/or  sludge balls in the gallbladder. He is here for removal of the  gallbladder. FINDINGS:  The patient surprisingly had very few adhesions. The  gallbladder was removed. I elected not to do a cholangiogram as the  cystic duct was small. The liver was otherwise normal.    DESCRIPTION OF THE OPERATIVE PROCEDURE:  The patient was brought into  the operating suite, placed supine on the operating room table. After  adequate inhalational anesthesia was administered, the patient's abdomen  was prepped and draped in usual sterile fashion. Incision was made  actually above the umbilicus. A Veress needle was placed. CO2 was  insufflated to a pressure of 15. Veress needle was removed. A trocar  was placed, and a camera was placed in the trocar verifying  intra-abdominal placement of trocar. The other three trocars, one in  the epigastrium, two laterally were placed under direct visualization.    The gallbladder dome was grasped, and the gallbladder and

## 2021-11-19 NOTE — BRIEF OP NOTE
Brief Postoperative Note      Patient: Tori Sylvester  YOB: 1965  MRN: [id number]    Date of Procedure: 11/19/2021    Pre-Op Diagnosis: GALLBLADDER SLUDGE, BILIARY COLIC    Post-Op Diagnosis: same       Procedure(s):  CHOLECYSTECTOMY LAPAROSCOPIC    Surgeon(s):  Geraldine Garibay MD    Assistant:  First Assistant: Kelsey Mirza RN    Anesthesia: General    Estimated Blood Loss (mL)5 ml    Complications: none    Specimens:   ID Type Source Tests Collected by Time Destination   A : gall bladder Tissue Gallbladder SURGICAL PATHOLOGY Geraldine Garibay MD 11/19/2021 0289        Implants:        Drains:     Findings: see op note    Electronically signed by Geraldine Garibay MD on 11/19/2021 at 8:59 AM

## 2021-11-22 ENCOUNTER — TELEPHONE (OUTPATIENT)
Dept: SURGERY | Age: 56
End: 2021-11-22

## 2021-11-22 NOTE — TELEPHONE ENCOUNTER
Pt states he is feeling well this morning after surgery. He states that he has taken himself off of pain medication, and is doing well. Pt states he is urinating with no issues, and will take stool softeners/laxatives if needed for BM. Pt states that incisions are clean, dry and intact- denies any s/sx of infection. Advised to call the office with any questions or concerns.

## 2021-12-02 ENCOUNTER — OFFICE VISIT (OUTPATIENT)
Dept: SURGERY | Age: 56
End: 2021-12-02

## 2021-12-02 VITALS
HEART RATE: 86 BPM | BODY MASS INDEX: 27.98 KG/M2 | OXYGEN SATURATION: 99 % | RESPIRATION RATE: 18 BRPM | WEIGHT: 195 LBS | TEMPERATURE: 96.4 F | SYSTOLIC BLOOD PRESSURE: 104 MMHG | DIASTOLIC BLOOD PRESSURE: 62 MMHG

## 2021-12-02 DIAGNOSIS — Z09 POSTOPERATIVE EXAMINATION: Primary | ICD-10-CM

## 2021-12-02 PROCEDURE — 99024 POSTOP FOLLOW-UP VISIT: CPT | Performed by: NURSE PRACTITIONER

## 2021-12-02 ASSESSMENT — ENCOUNTER SYMPTOMS
ANAL BLEEDING: 0
STRIDOR: 0
SINUS PRESSURE: 0
VOICE CHANGE: 0
TROUBLE SWALLOWING: 0
EYE DISCHARGE: 0
PHOTOPHOBIA: 0
RECTAL PAIN: 0
ABDOMINAL PAIN: 0
SHORTNESS OF BREATH: 0
ABDOMINAL DISTENTION: 0
COUGH: 0
EYE REDNESS: 0
BLOOD IN STOOL: 0
WHEEZING: 0
VOMITING: 0
RHINORRHEA: 0
APNEA: 0
DIARRHEA: 0
SORE THROAT: 0
CHOKING: 0
CONSTIPATION: 0
NAUSEA: 0
COLOR CHANGE: 0
FACIAL SWELLING: 0
CHEST TIGHTNESS: 0
BACK PAIN: 0
EYE ITCHING: 0
EYE PAIN: 0

## 2021-12-02 NOTE — PROGRESS NOTES
[address] [facility] [address]  Dept: [phone]  Dept Fax: [phone]  Loc: [phone]    Visit Date: 12/2/2021       Mandy Ayers is a 64 y.o. male who presents today for:  Chief Complaint   Patient presents with    Post-Op Check     s/p 11/19 lap yaritza       HPI:     Warren Anguiano presents today for a 2 week post op check. Patient doing well post cholecystectomy, states that all biliary colic symptoms have resolved. Incisional pain has improved and is no longer taking pain medication. The incisions look good, no signs of infection, no drainage no erythema. Denies N&V, tolerating meals and having normal bowel movements. May return to work with no restrictions. Surgical pathology has been reviewed and discussed. No follow-up necessary    FINAL DIAGNOSIS:   Gallbladder, cholecystectomy:             Chronic cholecystitis.           Cholelithiasis.           Cholesterolosis. Past Medical History:   Diagnosis Date    [address]A (cerebral vascular [address]) [address] [address]     with chemo - residual numbness in left hand    Hematoma     right leg s/p fall    Hx of blood clots     Arm    Hyperlipidemia     Hypertension     Non Hodgkin's lymphoma ([facility]     1995/96    Renal transplant recipient     [facility]      Past Surgical History:   Procedure Laterality Date    APPENDECTOMY      1972    BONE MARROW TRANSPLANT  1996    [facility] transplant once a year    CHOLECYSTECTOMY, LAPAROSCOPIC N/A 11/19/2021    CHOLECYSTECTOMY LAPAROSCOPIC performed by Akiko Salguero MD at [address] [facility] N/A 10/28/2020    WIDER EXCISION MELANOMA VERTEX OF SCALP performed by Creta Prader, MD at [address] [facility] [address]  06/06/2000    OTHER SURGICAL HISTORY      CAPD catheter insertion    OTHER SURGICAL HISTORY  06/09/2000    CAPD cath removal-Dr Erika Brown Family History   Problem Relation Age of Onset   [facility] Cancer Mother 61        colon - is now      Heart Failure Father         heart attack w/stents    Diabetes Sister         5 years ago diagnosed    Diabetes Brother         5 years ago diagnosed     Social History     Tobacco Use    Smoking status: Never Smoker    Smokeless tobacco: Never Used   Substance Use Topics    Alcohol use: Not Currently     Alcohol/week: 1.0 standard drink     Types: 1 Shots of liquor per week     Comment: very rare use. Current Outpatient Medications   Medication Sig Dispense Refill    acyclovir (ZOVIRAX) 200 MG capsule Take by mouth daily      mycophenolate (CELLCEPT) 250 MG capsule Take 1,000 mg by mouth 2 times daily      Misc Natural Products (GLUCOSAMINE CHOND COMPLEX/MSM PO) Take  by mouth.  Ascorbic Acid (VITAMIN C) 500 MG tablet Take 1,000 mg by mouth daily.  predniSONE (DELTASONE) 10 MG tablet Take 10 mg by mouth daily.  furosemide (LASIX) 40 MG tablet Take 20 mg by mouth daily        No current facility-administered medications for this visit. No Known Allergies    Subjective:      Review of Systems   Constitutional: Negative for activity change, appetite change, chills, diaphoresis, fatigue, fever and unexpected weight change. HENT: Negative for congestion, dental problem, drooling, ear discharge, ear pain, facial swelling, hearing loss, mouth sores, nosebleeds, postnasal drip, rhinorrhea, sinus pressure, sneezing, sore throat, tinnitus, trouble swallowing and voice change. Eyes: Negative for photophobia, pain, discharge, redness, itching and visual disturbance. Respiratory: Negative for apnea, cough, choking, chest tightness, shortness of breath, wheezing and stridor. Cardiovascular: Negative for chest pain, palpitations and leg swelling.    Gastrointestinal: Negative for abdominal distention, abdominal pain, anal bleeding, blood in stool, constipation, diarrhea, nausea, rectal pain and vomiting. Genitourinary: Negative for decreased urine volume, difficulty urinating, dysuria, enuresis, flank pain, frequency, genital sores, hematuria, penile discharge, penile pain, penile swelling, scrotal swelling, testicular pain and urgency. Musculoskeletal: Negative for arthralgias, back pain, gait problem, joint swelling, myalgias, neck pain and neck stiffness. Skin: Positive for wound. Negative for color change, pallor and rash. Surgical stab wounds-abdomen   Neurological: Negative for dizziness, tremors, seizures, syncope, facial asymmetry, speech difficulty, weakness, light-headedness, numbness and headaches. Hematological: Negative for adenopathy. Does not bruise/bleed easily. Psychiatric/Behavioral: Negative for agitation, behavioral problems, confusion, decreased concentration, dysphoric mood, hallucinations, self-injury, sleep disturbance and suicidal ideas. The patient is not nervous/anxious and is not hyperactive. Objective:     /62 (Site: Right Upper Arm, Position: Sitting, Cuff Size: Medium Adult)   Pulse 86   Temp 96.4 °F (35.8 °C) (Temporal)   Resp 18   Wt 195 lb (88.5 kg)   SpO2 99%   BMI 27.98 kg/m²     Wt Readings from Last 3 Encounters:   12/02/21 195 lb (88.5 kg)   11/19/21 192 lb 6.4 oz (87.3 kg)   10/21/21 191 lb (86.6 kg)       Physical Exam  Vitals reviewed. Constitutional:       Appearance: He is well-developed. HENT:      Head: Normocephalic. Eyes:      Pupils: Pupils are equal, round, and reactive to light. Cardiovascular:      Rate and Rhythm: Normal rate. Pulmonary:      Effort: Pulmonary effort is normal.   Abdominal:      Palpations: Abdomen is soft. Musculoskeletal:         General: Normal range of motion. Cervical back: Normal range of motion. Skin:     General: Skin is warm and dry. Neurological:      Mental Status: He is alert and oriented to person, place, and time.          Assessment/Plan: Siomara Whiting was seen today for post-op check. Diagnoses and all orders for this visit:    Postoperative examination        Return if symptoms worsen or fail to improve. Patient Instructions     No restrictions        Discusseduse, benefit, and side effects of prescribed medications. All patient questionsanswered. Pt voiced understanding.      Electronically signed by JENSEN Torre CNP on 12/2/2021 at 3:38 PM

## 2023-08-22 ENCOUNTER — OFFICE VISIT (OUTPATIENT)
Dept: NEPHROLOGY | Age: 58
End: 2023-08-22
Payer: COMMERCIAL

## 2023-08-22 VITALS
DIASTOLIC BLOOD PRESSURE: 72 MMHG | OXYGEN SATURATION: 98 % | HEART RATE: 83 BPM | SYSTOLIC BLOOD PRESSURE: 126 MMHG | BODY MASS INDEX: 29.41 KG/M2 | WEIGHT: 205 LBS

## 2023-08-22 DIAGNOSIS — Z94.0 KIDNEY TRANSPLANT RECIPIENT: Primary | ICD-10-CM

## 2023-08-22 PROCEDURE — 3074F SYST BP LT 130 MM HG: CPT | Performed by: INTERNAL MEDICINE

## 2023-08-22 PROCEDURE — 3078F DIAST BP <80 MM HG: CPT | Performed by: INTERNAL MEDICINE

## 2023-08-22 PROCEDURE — 99204 OFFICE O/P NEW MOD 45 MIN: CPT | Performed by: INTERNAL MEDICINE

## 2023-08-22 RX ORDER — PREDNISONE 10 MG/1
10 TABLET ORAL DAILY
Qty: 90 TABLET | Refills: 4 | Status: SHIPPED | OUTPATIENT
Start: 2023-08-22 | End: 2023-09-01

## 2023-08-22 RX ORDER — MYCOPHENOLATE MOFETIL 250 MG/1
1000 CAPSULE ORAL 2 TIMES DAILY
Qty: 720 CAPSULE | Refills: 4 | Status: SHIPPED | OUTPATIENT
Start: 2023-08-22

## 2023-08-22 RX ORDER — FUROSEMIDE 20 MG/1
20 TABLET ORAL DAILY
Qty: 90 TABLET | Refills: 4 | Status: SHIPPED | OUTPATIENT
Start: 2023-08-22

## 2023-08-22 RX ORDER — CALCIUM POLYCARBOPHIL 625 MG 625 MG/1
625 TABLET ORAL DAILY
COMMUNITY

## 2023-08-22 RX ORDER — CHOLECALCIFEROL (VITAMIN D3) 1250 MCG
CAPSULE ORAL
COMMUNITY

## 2023-08-22 ASSESSMENT — ENCOUNTER SYMPTOMS
NAUSEA: 0
SHORTNESS OF BREATH: 0
VOMITING: 0
EYES NEGATIVE: 1
COUGH: 0
BACK PAIN: 0
ABDOMINAL PAIN: 0
DIARRHEA: 0

## 2023-08-22 NOTE — PROGRESS NOTES
[facility]         Outpatient Initial consult note         8/22/2023 2:15 PM    [address] AND HYPERTENSION  [address]  Dept: [phone]  Loc: [phone]      Patient Name:   Johnie Crenshaw  YOB: 1965  Primary Care Physician:  Erika Jean Baptiste MD     Chief Complaint : Evaluation of   kidney transplant     History of presenting illness :Johnie Crenshaw is a 62 y.o.   male with Past Medical History as stated below was sent / referred by Erika Jean Baptiste MD forevaluation of the above problem. Patient has a history of hypertension for ??? years. Patient has no history of diabetes mellitus. The patient denies history of renal stones anddenies NSAID use. Patient has no history of proteinuria. Patient Never had a kidney biopsy done. Patient does not use Herbal remedies/vitamin supplements. Patient denies frequency, hematuria, hesitancy, retention. Patient has no family history of kidney disease. Patient has unspecified glomerulonephritis and has been on dialysis for probably 6 months and he had living donor kidney transplant from sister on 6/6/2000 and apparently it was 6 antigen match and he has always been on CellCept and prednisone since his transplant. This was done at [facility] in of his being followed up locally so he presented for follow-up     Past History :     Past Medical History:   Diagnosis Date    CVA (cerebral vascular accident) [facility])     with chemo - residual numbness in left hand    Hematoma     right leg s/p fall    Hx of blood clots     Arm    Hyperlipidemia     Hypertension     Non Hodgkin's lymphoma ([address] )     1995/96    Renal transplant recipient     [facility] transplant follows-KAMLES[facility]     Past Surgical History:   Procedure Laterality Date    [address] transplant once a year

## 2023-09-27 ENCOUNTER — NURSE ONLY (OUTPATIENT)
Dept: LAB | Age: 58
End: 2023-09-27

## 2023-09-27 DIAGNOSIS — Z94.0 KIDNEY TRANSPLANT RECIPIENT: ICD-10-CM

## 2023-09-27 LAB
ALBUMIN SERPL BCG-MCNC: 4.1 G/DL (ref 3.5–5.1)
ANION GAP SERPL CALC-SCNC: 11 MEQ/L (ref 8–16)
BUN SERPL-MCNC: 12 MG/DL (ref 7–22)
CALCIUM SERPL-MCNC: 9 MG/DL (ref 8.5–10.5)
CHLORIDE SERPL-SCNC: 109 MEQ/L (ref 98–111)
CO2 SERPL-SCNC: 25 MEQ/L (ref 23–33)
CREAT SERPL-MCNC: 0.9 MG/DL (ref 0.4–1.2)
GFR SERPL CREATININE-BSD FRML MDRD: > 60 ML/MIN/1.73M2
GLUCOSE SERPL-MCNC: 91 MG/DL (ref 70–108)
PHOSPHATE SERPL-MCNC: 2.6 MG/DL (ref 2.4–4.7)
POTASSIUM SERPL-SCNC: 4.5 MEQ/L (ref 3.5–5.2)
SODIUM SERPL-SCNC: 145 MEQ/L (ref 135–145)

## 2023-11-08 ENCOUNTER — HOSPITAL ENCOUNTER (OUTPATIENT)
Dept: ULTRASOUND IMAGING | Age: 58
Discharge: HOME | End: 2023-11-08
Attending: INTERNAL MEDICINE
Payer: COMMERCIAL

## 2023-11-08 ENCOUNTER — HOSPITAL ENCOUNTER (OUTPATIENT)
Age: 58
Discharge: HOME | End: 2023-11-08
Payer: COMMERCIAL

## 2023-11-08 ENCOUNTER — TELEPHONE (OUTPATIENT)
Dept: NEPHROLOGY | Age: 58
End: 2023-11-08

## 2023-11-08 DIAGNOSIS — Z94.0 KIDNEY TRANSPLANT RECIPIENT: ICD-10-CM

## 2023-11-08 LAB
BASOPHILS ABSOLUTE: 0 THOU/MM3 (ref 0–0.1)
BASOPHILS NFR BLD AUTO: 0.4 %
DEPRECATED RDW RBC AUTO: 48.4 FL (ref 35–45)
EKG ATRIAL RATE: 77 BPM
EKG P AXIS: 77 DEGREES
EKG P-R INTERVAL: 152 MS
EKG Q-T INTERVAL: 396 MS
EKG QRS DURATION: 102 MS
EKG QTC CALCULATION (BAZETT): 448 MS
EKG R AXIS: -49 DEGREES
EKG T AXIS: 27 DEGREES
EKG VENTRICULAR RATE: 77 BPM
EOSINOPHIL NFR BLD AUTO: 0.9 %
EOSINOPHILS ABSOLUTE: 0.1 THOU/MM3 (ref 0–0.4)
ERYTHROCYTE [DISTWIDTH] IN BLOOD BY AUTOMATED COUNT: 14.1 % (ref 11.5–14.5)
HCT VFR BLD AUTO: 43.5 % (ref 42–52)
HGB BLD-MCNC: 13.1 GM/DL (ref 14–18)
IMM GRANULOCYTES # BLD AUTO: 0.05 THOU/MM3 (ref 0–0.07)
IMM GRANULOCYTES NFR BLD AUTO: 0.6 %
LYMPHOCYTES ABSOLUTE: 1.7 THOU/MM3 (ref 1–4.8)
LYMPHOCYTES NFR BLD AUTO: 21.9 %
MCH RBC QN AUTO: 28.3 PG (ref 26–33)
MCHC RBC AUTO-ENTMCNC: 30.1 GM/DL (ref 32.2–35.5)
MCV RBC AUTO: 94 FL (ref 80–94)
MONOCYTES ABSOLUTE: 0.7 THOU/MM3 (ref 0.4–1.3)
MONOCYTES NFR BLD AUTO: 9.4 %
NEUTROPHILS NFR BLD AUTO: 66.8 %
NRBC BLD AUTO-RTO: 0 /100 WBC
PLATELET # BLD AUTO: 230 THOU/MM3 (ref 130–400)
PMV BLD AUTO: 10.1 FL (ref 9.4–12.4)
RBC # BLD AUTO: 4.63 MILL/MM3 (ref 4.7–6.1)
SEGMENTED NEUTROPHILS ABSOLUTE COUNT: 5.1 THOU/MM3 (ref 1.8–7.7)
WBC # BLD AUTO: 7.7 THOU/MM3 (ref 4.8–10.8)

## 2023-11-08 PROCEDURE — 93010 ELECTROCARDIOGRAM REPORT: CPT | Performed by: INTERNAL MEDICINE

## 2023-11-08 PROCEDURE — 93005 ELECTROCARDIOGRAM TRACING: CPT | Performed by: PODIATRIST

## 2023-11-08 PROCEDURE — 85025 COMPLETE CBC W/AUTO DIFF WBC: CPT

## 2023-11-08 PROCEDURE — 36415 COLL VENOUS BLD VENIPUNCTURE: CPT

## 2023-11-08 PROCEDURE — 76770 US EXAM ABDO BACK WALL COMP: CPT

## 2023-11-08 NOTE — TELEPHONE ENCOUNTER
----- Message from Jalen Gonzales MD sent at 11/8/2023  2:23 PM EST -----  Please let the patient know that the kidney US scan is normal except there are some kidney stones noted

## 2023-11-19 LAB
EKG ATRIAL RATE: 77 BPM
EKG P AXIS: 77 DEGREES
EKG P-R INTERVAL: 152 MS
EKG Q-T INTERVAL: 396 MS
EKG QRS DURATION: 102 MS
EKG QTC CALCULATION (BAZETT): 448 MS
EKG R AXIS: -49 DEGREES
EKG T AXIS: 27 DEGREES
EKG VENTRICULAR RATE: 77 BPM

## 2023-12-04 ENCOUNTER — HOSPITAL ENCOUNTER (OUTPATIENT)
Dept: GENERAL RADIOLOGY | Age: 58
Discharge: HOME | End: 2023-12-04
Payer: COMMERCIAL

## 2023-12-04 ENCOUNTER — HOSPITAL ENCOUNTER (OUTPATIENT)
Age: 58
Discharge: HOME | End: 2023-12-04
Payer: COMMERCIAL

## 2023-12-04 DIAGNOSIS — M79.644 FINGER PAIN, RIGHT: ICD-10-CM

## 2023-12-04 PROCEDURE — 73130 X-RAY EXAM OF HAND: CPT

## 2024-02-17 ENCOUNTER — NURSE ONLY (OUTPATIENT)
Dept: LAB | Age: 59
End: 2024-02-17

## 2024-02-17 DIAGNOSIS — Z94.0 KIDNEY TRANSPLANT RECIPIENT: ICD-10-CM

## 2024-02-17 LAB
BACTERIA: NORMAL
BILIRUB UR QL STRIP: NEGATIVE
CASTS #/AREA URNS LPF: NORMAL /LPF
CASTS #/AREA URNS LPF: NORMAL /LPF
CHARACTER UR: CLEAR
CHARCOAL URNS QL MICRO: NORMAL
COLOR UR: YELLOW
CREAT UR-MCNC: 63.8 MG/DL
CREAT UR-MCNC: 63.8 MG/DL
CRYSTALS URNS QL MICRO: NORMAL
EPITHELIAL CELLS, UA: NORMAL /HPF
GLUCOSE UR QL STRIP.AUTO: NEGATIVE MG/DL
HGB UR QL STRIP.AUTO: NEGATIVE
KETONES UR QL STRIP.AUTO: NEGATIVE
LEUKOCYTE ESTERASE UR QL STRIP.AUTO: NEGATIVE
MICROALBUMIN UR-MCNC: < 1.2 MG/DL
MICROALBUMIN/CREAT RATIO PNL UR: 19 MG/G (ref 0–30)
NITRITE UR QL STRIP.AUTO: NEGATIVE
PH UR STRIP.AUTO: 7 [PH] (ref 5–9)
PROT UR STRIP.AUTO-MCNC: NEGATIVE MG/DL
PROT UR-MCNC: 4.7 MG/DL
PROT/CREAT 24H UR: 0.07 MG/G{CREAT}
RENAL EPI CELLS #/AREA URNS HPF: NORMAL /[HPF]
SPECIFIC GRAVITY UA: 1.01 (ref 1–1.03)
UROBILINOGEN, URINE: 0.2 EU/DL (ref 0–1)
WBC #/AREA URNS HPF: NORMAL /HPF
YEAST LIKE FUNGI URNS QL MICRO: NORMAL

## 2024-02-19 DIAGNOSIS — Z94.0 KIDNEY TRANSPLANT RECIPIENT: Primary | ICD-10-CM

## 2024-02-21 ENCOUNTER — NURSE ONLY (OUTPATIENT)
Dept: LAB | Age: 59
End: 2024-02-21

## 2024-02-21 DIAGNOSIS — Z94.0 KIDNEY TRANSPLANT RECIPIENT: ICD-10-CM

## 2024-02-21 LAB
ANION GAP SERPL CALC-SCNC: 12 MEQ/L (ref 8–16)
BUN SERPL-MCNC: 14 MG/DL (ref 7–22)
CALCIUM SERPL-MCNC: 9.3 MG/DL (ref 8.5–10.5)
CHLORIDE SERPL-SCNC: 106 MEQ/L (ref 98–111)
CO2 SERPL-SCNC: 24 MEQ/L (ref 23–33)
CREAT SERPL-MCNC: 0.9 MG/DL (ref 0.4–1.2)
GFR SERPL CREATININE-BSD FRML MDRD: > 60 ML/MIN/1.73M2
GLUCOSE SERPL-MCNC: 95 MG/DL (ref 70–108)
POTASSIUM SERPL-SCNC: 5.2 MEQ/L (ref 3.5–5.2)
SODIUM SERPL-SCNC: 142 MEQ/L (ref 135–145)

## 2024-02-26 ENCOUNTER — OFFICE VISIT (OUTPATIENT)
Dept: NEPHROLOGY | Age: 59
End: 2024-02-26
Payer: COMMERCIAL

## 2024-02-26 VITALS
OXYGEN SATURATION: 97 % | DIASTOLIC BLOOD PRESSURE: 65 MMHG | SYSTOLIC BLOOD PRESSURE: 127 MMHG | BODY MASS INDEX: 29.27 KG/M2 | HEART RATE: 80 BPM | WEIGHT: 204 LBS

## 2024-02-26 DIAGNOSIS — Z94.0 KIDNEY TRANSPLANT RECIPIENT: Primary | ICD-10-CM

## 2024-02-26 PROCEDURE — 99214 OFFICE O/P EST MOD 30 MIN: CPT | Performed by: INTERNAL MEDICINE

## 2024-02-26 PROCEDURE — 3078F DIAST BP <80 MM HG: CPT | Performed by: INTERNAL MEDICINE

## 2024-02-26 PROCEDURE — 3074F SYST BP LT 130 MM HG: CPT | Performed by: INTERNAL MEDICINE

## 2024-02-26 RX ORDER — PREDNISONE 10 MG/1
10 TABLET ORAL DAILY
Qty: 10 TABLET | Refills: 0
Start: 2024-02-26 | End: 2024-03-07

## 2024-02-26 NOTE — PROGRESS NOTES
[facility]        Outpatient Follow-Up note         2/26/2024 2:47 PM    Patient Name:   Ousmane Lockett  YOB: 1965  Primary Care Physician:  Daniel Barbosa MD   [facility] [place] [facility] [place] [facility]  [facility]  [address]  [address]  [facility] [address]  Dept: [phone]  Loc: [phone]     Chief Complaint / Reason for follow-up : Follow Up of kidney transplant     Interval History :  Patient seen and examined by me.   No hospitalizations and no med changes      Past History :  Past Medical History:   Diagnosis Date    CVA (cerebral vascular accident) (HCC)     with chemo - residual numbness in left hand    Hematoma     right leg s/p fall    Hx of blood clots     Arm    Hyperlipidemia     Hypertension     Non Hodgkin's lymphoma (HCC)     1995/96    Renal transplant recipient     [facility] transplant follows-Dem[facility]     Past Surgical History:   Procedure Laterality Date    APPENDECTOMY      1972    BONE MARROW TRANSPLANT  1996    [facility] transplant once a year    CHOLECYSTECTOMY, LAPAROSCOPIC N/A 11/19/2021    CHOLECYSTECTOMY LAPAROSCOPIC performed by Abdi Carmona MD at [facility] OR    COLONOSCOPY      FACIAL SURGERY N/A 10/28/2020    WIDER EXCISION MELANOMA VERTEX OF SCALP performed by Tera Perez MD at [facility] OR    KIDNEY TRANSPLANT  06/06/2000    OTHER SURGICAL HISTORY      CAPD catheter insertion    OTHER SURGICAL HISTORY  06/09/2000    CAPD cath removal-Dr Villeda        Medications :     Outpatient Medications Marked as Taking for the 2/26/24 encounter (Office Visit) with Dax Oates MD   Medication Sig Dispense Refill    predniSONE (DELTASONE) 10 MG tablet Take 1 tablet by mouth daily for 10 days 10 tablet 0    polycarbophil (FIBERCON) 625 MG tablet Take 1 tablet by mouth daily      Cholecalciferol (VITAMIN D3) 1.25 MG (09699 UT) CAPS Take by mouth      mycophenolate (CELLCEPT) 250 MG

## 2024-03-08 ENCOUNTER — TELEPHONE (OUTPATIENT)
Dept: NEPHROLOGY | Age: 59
End: 2024-03-08

## 2024-03-08 NOTE — TELEPHONE ENCOUNTER
Okay to take it for a few days initially once pain is better would prefer him to take it on an as-needed basis unless the pain is intolerable

## 2024-03-08 NOTE — TELEPHONE ENCOUNTER
Pt phoned - dr mendoza ortho doctor from [place] wants to put pt on mobic or voltaren for should pain is this ok with dr mcdaniels? [phone] (dr fierro office)

## 2024-03-11 NOTE — TELEPHONE ENCOUNTER
Ousmane called back. Fax [phone] phone [phone] ext. [phone]. I faxed the note to the number provided.

## 2024-04-15 ENCOUNTER — HOSPITAL ENCOUNTER (OUTPATIENT)
Dept: RADIATION ONCOLOGY | Age: 59
Discharge: HOME | End: 2024-04-15
Payer: COMMERCIAL

## 2024-04-15 ENCOUNTER — HOSPITAL ENCOUNTER (OUTPATIENT)
Dept: MRI IMAGING | Age: 59
Discharge: HOME | End: 2024-04-15
Attending: RADIOLOGY

## 2024-04-15 ENCOUNTER — HOSPITAL ENCOUNTER (OUTPATIENT)
Dept: GENERAL RADIOLOGY | Age: 59
Discharge: HOME | End: 2024-04-15
Attending: RADIOLOGY

## 2024-04-15 VITALS
HEIGHT: 70 IN | SYSTOLIC BLOOD PRESSURE: 146 MMHG | OXYGEN SATURATION: 96 % | DIASTOLIC BLOOD PRESSURE: 70 MMHG | RESPIRATION RATE: 16 BRPM | WEIGHT: 211 LBS | BODY MASS INDEX: 30.21 KG/M2 | HEART RATE: 81 BPM | TEMPERATURE: 98 F

## 2024-04-15 DIAGNOSIS — C79.51 METASTATIC CARCINOMA INVOLVING BONE WITH UNKNOWN PRIMARY SITE (HCC): Primary | ICD-10-CM

## 2024-04-15 DIAGNOSIS — Z00.6 EXAMINATION FOR NORMAL COMPARISON FOR CLINICAL RESEARCH: ICD-10-CM

## 2024-04-15 DIAGNOSIS — C80.1 METASTATIC CARCINOMA INVOLVING BONE WITH UNKNOWN PRIMARY SITE (HCC): Primary | ICD-10-CM

## 2024-04-15 PROCEDURE — 99202 OFFICE O/P NEW SF 15 MIN: CPT | Performed by: RADIOLOGY

## 2024-04-15 PROCEDURE — 99205 OFFICE O/P NEW HI 60 MIN: CPT

## 2024-04-15 RX ORDER — PREDNISONE 10 MG/1
10 TABLET ORAL DAILY
COMMUNITY

## 2024-04-15 RX ORDER — DICLOFENAC POTASSIUM 50 MG/1
TABLET, FILM COATED ORAL
COMMUNITY
Start: 2024-04-05

## 2024-04-15 RX ORDER — OXYCODONE HYDROCHLORIDE 5 MG/1
5 TABLET ORAL EVERY 6 HOURS PRN
COMMUNITY
Start: 2024-04-12 | End: 2024-04-19

## 2024-04-15 ASSESSMENT — ENCOUNTER SYMPTOMS
TROUBLE SWALLOWING: 0
BLOOD IN STOOL: 0
SHORTNESS OF BREATH: 0
ABDOMINAL PAIN: 0
COUGH: 0
BACK PAIN: 0
NAUSEA: 0

## 2024-04-15 ASSESSMENT — PAIN DESCRIPTION - ORIENTATION: ORIENTATION: RIGHT

## 2024-04-15 ASSESSMENT — PAIN DESCRIPTION - LOCATION: LOCATION: SHOULDER

## 2024-04-15 ASSESSMENT — PAIN SCALES - GENERAL: PAINLEVEL_OUTOF10: 6

## 2024-04-15 NOTE — PROGRESS NOTES
during the initial nursing assessment were discussed and reviewed in detail with the nursing staff member who completed this portion of the note and I agree with the information and assessment as written. A complete review of systems was performed and found to be negative except as presented above.    PHYSICAL EXAMINATION:     VITAL SIGNS: BP (!) 146/70   Pulse 81   Temp 98 °F (36.7 °C) (Infrared)   Resp 16   Ht 1.778 m (5' 10\")   Wt 95.7 kg (211 lb)   SpO2 96%   BMI 30.28 kg/m²     ECO - Symptomatic but completely ambulatory (Restricted in physically strenuous activity but ambulatory and able to carry out work of a light or sedentary nature. For example, light housework, office work)    Physical Exam  Constitutional:       General: He is not in acute distress.     Appearance: Normal appearance.   HENT:      Head: Normocephalic and atraumatic.   Cardiovascular:      Rate and Rhythm: Normal rate and regular rhythm.      Comments: 2+ right radial pulse   Pulmonary:      Effort: Pulmonary effort is normal. No respiratory distress.   Abdominal:      General: Abdomen is flat.   Musculoskeletal:      Comments: Very limited ROM of the right upper extremity secondary to pain. Tender to palpation. No erythema or edema of the right upper arm   Skin:     Capillary Refill: Capillary refill takes less than 2 seconds.   Neurological:      Mental Status: He is alert and oriented to person, place, and time.      Gait: Gait normal.         Past Medical History:   Diagnosis Date    CVA (cerebral vascular accident) (HCC)     with chemo - residual numbness in left hand    Hematoma     right leg s/p fall    Hx of blood clots     Arm    Hyperlipidemia     Hypertension     Non Hodgkin's lymphoma (HCC)         Renal transplant recipient     [facility]       Past Surgical History:   Procedure Laterality Date    APPENDECTOMY          BONE MARROW TRANSPLANT      [facility] transplant once a year

## 2024-04-16 ENCOUNTER — HOSPITAL ENCOUNTER (OUTPATIENT)
Dept: CT IMAGING | Age: 59
Discharge: HOME | End: 2024-04-16

## 2024-04-16 ENCOUNTER — HOSPITAL ENCOUNTER (OUTPATIENT)
Dept: RADIATION ONCOLOGY | Age: 59
Discharge: HOME | End: 2024-04-16
Payer: COMMERCIAL

## 2024-04-16 DIAGNOSIS — C79.51 SECONDARY MALIGNANT NEOPLASM OF BONE (HCC): ICD-10-CM

## 2024-04-16 PROCEDURE — 3209999900 CT GUIDE RADIATION THERAPY NO CHARGE

## 2024-04-16 PROCEDURE — 77334 RADIATION TREATMENT AID(S): CPT | Performed by: RADIOLOGY

## 2024-04-16 PROCEDURE — 77290 THER RAD SIMULAJ FIELD CPLX: CPT | Performed by: RADIOLOGY

## 2024-04-17 ENCOUNTER — SOCIAL WORK (OUTPATIENT)
Dept: RADIATION ONCOLOGY | Age: 59
End: 2024-04-17

## 2024-04-17 NOTE — PROGRESS NOTES
Oncology Social Work    Date: 4/17/2024  Time: 3:29 PM  Name: Ousmane Lockett  MRN: [id number]     Contact Type: Follow-up    Note:   Situation: This staff called Ousmane Lockett via phone support to introduce myself as his Oncology Social Worker.     Background:  Ousmane was referred to this staff by the Radiation Dept since he had a recent appointment here. This staff was calling to review the Distress Thermometer completed during the visit.     Assessment: This staff had the opportunity to speak with Ousmane. He appeared very pleasant as we discussed the purpose of the call was to provide him with education regarding the services provided by the SW. He had a couple outstanding concerns (pain, sleep, and worry) but shared he thinks he is coping well at this time.   - A quick review of the Power of  document was provided since he doesn't have the documents on file. This staff encouraged him to arrange time during a future appt and this staff will assist in the completion.  - No community referrals were placed at this time because he is too early in the process to know or understand what services he may need. Therefore, his referral services may be reconsidered should his needs regarding assistance change.    Recommendation: Follow-up will be initiated by Ousmane based on need.  provided him with my contact information and will remain available for support.        JACK Neville, ALDO, JING  Oncology Social Worker      Electronically signed by JACK Neville LSW, JING on 4/17/2024 at 3:29 PM

## 2024-04-18 PROCEDURE — 77300 RADIATION THERAPY DOSE PLAN: CPT | Performed by: RADIOLOGY

## 2024-04-18 PROCEDURE — 77295 3-D RADIOTHERAPY PLAN: CPT | Performed by: RADIOLOGY

## 2024-04-19 PROCEDURE — 77334 RADIATION TREATMENT AID(S): CPT | Performed by: RADIOLOGY

## 2024-04-22 ENCOUNTER — HOSPITAL ENCOUNTER (OUTPATIENT)
Dept: RADIATION ONCOLOGY | Age: 59
Discharge: HOME | End: 2024-04-22
Payer: COMMERCIAL

## 2024-04-22 PROCEDURE — 77336 RADIATION PHYSICS CONSULT: CPT | Performed by: RADIOLOGY

## 2024-04-22 PROCEDURE — 77280 THER RAD SIMULAJ FIELD SMPL: CPT | Performed by: RADIOLOGY

## 2024-04-22 PROCEDURE — 77412 RADIATION TX DELIVERY LVL 3: CPT | Performed by: RADIOLOGY

## 2024-04-22 NOTE — DISCHARGE INSTRUCTIONS
PATIENT DISCHARGE INSTRUCTIONS    Remember that side effects present at the end of your treatments will improve within a few weeks after the last treatment.  Eat well balanced meals even though your treatments are finished.  This will help speed the healing process. Continue any special diets prescribed to control side effects until these side effects have been resolved.  Get plenty of rest.  If you have experienced fatigue and/or weakness, this may continue for several weeks after your last treatment.  Continue with your daily activities according to the way you feel.  Continue to be gentle with your skin.  Follow your present skin care instructions until your follow-up visit.  IF YOU DEVELOP ANY CHANGES IN YOUR SKIN IN THE AREA TREATED WITH RADIATION, PLEASE CALL THE RADIATION ONCOLOGY NURSE -[phone].  Protect your skin from any injury and avoid direct sun exposure in the treatment area.  The skin in the treated area may always be more sensitive than the rest of your skin.  Always use SPF 30 or higher sun block if you will be in the sun and cannot avoid exposure.  Please contact your referring physician for a follow-up appointment in addition to your Radiation Oncology appointment.  Presence of pain:   Medication Taper: No    See Instructions Dated: N/A  Follow up orders: Will be discussed at Follow-Up

## 2024-04-23 ENCOUNTER — HOSPITAL ENCOUNTER (OUTPATIENT)
Dept: RADIATION ONCOLOGY | Age: 59
Discharge: HOME | End: 2024-04-23
Payer: COMMERCIAL

## 2024-04-23 VITALS
OXYGEN SATURATION: 97 % | HEART RATE: 79 BPM | SYSTOLIC BLOOD PRESSURE: 149 MMHG | TEMPERATURE: 98 F | WEIGHT: 212.52 LBS | BODY MASS INDEX: 30.49 KG/M2 | DIASTOLIC BLOOD PRESSURE: 72 MMHG | RESPIRATION RATE: 16 BRPM

## 2024-04-23 PROCEDURE — 77387 GUIDANCE FOR RADJ TX DLVR: CPT | Performed by: RADIOLOGY

## 2024-04-23 PROCEDURE — 77412 RADIATION TX DELIVERY LVL 3: CPT | Performed by: RADIOLOGY

## 2024-04-23 PROCEDURE — 77014 CHG CT GUIDANCE RADIATION THERAPY FLDS PLACEMENT: CPT | Performed by: RADIOLOGY

## 2024-04-23 NOTE — PROGRESS NOTES
[facility]          [address], [address]        [place] [id number]        O: [phone]        F: [phone]       [employer]            Dr. Bull Soler MD MS          Dr. Poly Garrett MD PhD    ON TREATMENT VISIT (OTV) NOTE     Date of Service: 2024  Patient ID: Ousmane Lockett   : 1965  MRN: [id number]   Acct Number: [id number]     RADIATION ONCOLOGY ATTENDING:  Bull Soler MD MS    DIAGNOSIS:   Cancer Staging   No matching staging information was found for the patient.    Treatment Area: Bone    Current Total Dose(cGy): 600  Current Fraction: 2/10  Final/Cumulative Rx. Dose (cGy): 3000    Patient was seen today for weekly visit.     Wt Readings from Last 3 Encounters:   24 96.4 kg (212 lb 8.4 oz)   04/15/24 95.7 kg (211 lb)   24 92.5 kg (204 lb)       BP (!) 149/72   Pulse 79   Temp 98 °F (36.7 °C)   Resp 16   Wt 96.4 kg (212 lb 8.4 oz)   SpO2 97%   BMI 30.49 kg/m²     Lab Results   Component Value Date    WBC 7.7 2023    HGB 13.1 (L) 2023    HCT 43.5 2023     2023       Comfort Alteration  Fatigue:Able to perform daily activities with rest periods    Pain Location: Denies  Pain Intensity (Current): 0 No Pain  Pain Treatment: N/A  Pain Relief: n/a    Emotional Alteration:   Coping: effective    Nutritional Alteration  Anorexia: none   Nausea: No nausea noted  Vomiting: No vomiting     Skin Alteration   Skin reaction: No changes noted    Additional Comments: Not using any lotions.    MEDICATIONS:     Current Outpatient Medications   Medication Sig Dispense Refill    predniSONE (DELTASONE) 10 MG tablet Take 1 tablet by mouth daily      diclofenac (CATAFLAM) 50 MG tablet take up to 2 tablets by mouth daily if needed for pain      polycarbophil (FIBERCON) 625 MG tablet Take 1 tablet by mouth daily      Cholecalciferol (VITAMIN D3) 1.25 MG (39930 UT) CAPS Take by

## 2024-04-24 ENCOUNTER — HOSPITAL ENCOUNTER (OUTPATIENT)
Dept: RADIATION ONCOLOGY | Age: 59
Discharge: HOME | End: 2024-04-24
Payer: COMMERCIAL

## 2024-04-24 PROCEDURE — 77412 RADIATION TX DELIVERY LVL 3: CPT | Performed by: RADIOLOGY

## 2024-04-24 PROCEDURE — 77014 CHG CT GUIDANCE RADIATION THERAPY FLDS PLACEMENT: CPT | Performed by: RADIOLOGY

## 2024-04-24 PROCEDURE — 77387 GUIDANCE FOR RADJ TX DLVR: CPT | Performed by: RADIOLOGY

## 2024-04-25 ENCOUNTER — HOSPITAL ENCOUNTER (OUTPATIENT)
Dept: RADIATION ONCOLOGY | Age: 59
Discharge: HOME | End: 2024-04-25
Payer: COMMERCIAL

## 2024-04-25 PROCEDURE — 77014 CHG CT GUIDANCE RADIATION THERAPY FLDS PLACEMENT: CPT | Performed by: RADIOLOGY

## 2024-04-25 PROCEDURE — 77387 GUIDANCE FOR RADJ TX DLVR: CPT | Performed by: RADIOLOGY

## 2024-04-25 PROCEDURE — 77412 RADIATION TX DELIVERY LVL 3: CPT | Performed by: RADIOLOGY

## 2024-04-26 ENCOUNTER — HOSPITAL ENCOUNTER (OUTPATIENT)
Dept: RADIATION ONCOLOGY | Age: 59
Discharge: HOME | End: 2024-04-26
Payer: COMMERCIAL

## 2024-04-26 PROCEDURE — 77412 RADIATION TX DELIVERY LVL 3: CPT | Performed by: RADIOLOGY

## 2024-04-26 PROCEDURE — 77387 GUIDANCE FOR RADJ TX DLVR: CPT | Performed by: RADIOLOGY

## 2024-04-29 ENCOUNTER — HOSPITAL ENCOUNTER (OUTPATIENT)
Dept: RADIATION ONCOLOGY | Age: 59
Discharge: HOME | End: 2024-04-29
Payer: COMMERCIAL

## 2024-04-29 PROCEDURE — 77336 RADIATION PHYSICS CONSULT: CPT | Performed by: RADIOLOGY

## 2024-04-29 PROCEDURE — 77014 CHG CT GUIDANCE RADIATION THERAPY FLDS PLACEMENT: CPT | Performed by: RADIOLOGY

## 2024-04-29 PROCEDURE — 77412 RADIATION TX DELIVERY LVL 3: CPT | Performed by: RADIOLOGY

## 2024-04-29 PROCEDURE — 77387 GUIDANCE FOR RADJ TX DLVR: CPT | Performed by: RADIOLOGY

## 2024-04-30 ENCOUNTER — HOSPITAL ENCOUNTER (OUTPATIENT)
Dept: RADIATION ONCOLOGY | Age: 59
Discharge: HOME | End: 2024-04-30
Payer: COMMERCIAL

## 2024-04-30 VITALS
HEART RATE: 76 BPM | WEIGHT: 212.3 LBS | OXYGEN SATURATION: 97 % | BODY MASS INDEX: 30.46 KG/M2 | SYSTOLIC BLOOD PRESSURE: 143 MMHG | DIASTOLIC BLOOD PRESSURE: 67 MMHG | TEMPERATURE: 98 F | RESPIRATION RATE: 18 BRPM

## 2024-04-30 PROCEDURE — 77387 GUIDANCE FOR RADJ TX DLVR: CPT | Performed by: RADIOLOGY

## 2024-04-30 PROCEDURE — 77014 CHG CT GUIDANCE RADIATION THERAPY FLDS PLACEMENT: CPT | Performed by: RADIOLOGY

## 2024-04-30 PROCEDURE — 77412 RADIATION TX DELIVERY LVL 3: CPT | Performed by: RADIOLOGY

## 2024-04-30 ASSESSMENT — PAIN DESCRIPTION - LOCATION: LOCATION: SHOULDER

## 2024-04-30 ASSESSMENT — PAIN SCALES - GENERAL: PAINLEVEL_OUTOF10: 2

## 2024-04-30 ASSESSMENT — PAIN DESCRIPTION - ORIENTATION: ORIENTATION: RIGHT

## 2024-04-30 NOTE — PROGRESS NOTES
[facility]          [address], [address]        [place] [id number]        O: [phone]        F: [phone]       [employer]            Dr. Bull Soler MD MS          Dr. Poly Garrett MD PhD    ON TREATMENT VISIT (OTV) NOTE     Date of Service: 2024  Patient ID: Ousmane Lockett   : 1965  MRN: [id number]   Acct Number: [id number]     RADIATION ONCOLOGY ATTENDING:  Bull Soler MD MS    DIAGNOSIS:      2024: Tumor, unclassified, malignant 1 - Right Secondary malignant neoplasm of bone [C79.51]    Treatment Area: Bone    Current Total Dose(cGy): 2100  Current Fraction: 7/10  Final/Cumulative Rx. Dose (cGy): 3000    Patient was seen today for weekly visit.     Wt Readings from Last 3 Encounters:   24 96.3 kg (212 lb 4.9 oz)   24 96.4 kg (212 lb 8.4 oz)   04/15/24 95.7 kg (211 lb)       BP (!) 143/67   Pulse 76   Temp 98 °F (36.7 °C) (Infrared)   Resp 18   Wt 96.3 kg (212 lb 4.9 oz)   SpO2 97%   BMI 30.46 kg/m²     Lab Results   Component Value Date    WBC 7.7 2023    HGB 13.1 (L) 2023    HCT 43.5 2023     2023       Comfort Alteration  Fatigue:Able to perform daily activities with rest periods    Pain Location: Right Shoulder  Pain Intensity (Current): 2 Mild pain  Pain Treatment: Opioid  Pain Relief: Pain relieved 75%    Emotional Alteration:   Coping: effective    Nutritional Alteration  Anorexia: none   Nausea: No nausea noted  Vomiting: No vomiting     Skin Alteration   Skin reaction: No changes noted    Additional Comments: Not using any lotions.    MEDICATIONS:     Current Outpatient Medications   Medication Sig Dispense Refill    predniSONE (DELTASONE) 10 MG tablet Take 1 tablet by mouth daily      diclofenac (CATAFLAM) 50 MG tablet take up to 2 tablets by mouth daily if needed for pain      polycarbophil (FIBERCON) 625 MG tablet Take 1 tablet by mouth

## 2024-05-01 ENCOUNTER — HOSPITAL ENCOUNTER (OUTPATIENT)
Dept: RADIATION ONCOLOGY | Age: 59
Discharge: HOME | End: 2024-05-01
Payer: COMMERCIAL

## 2024-05-01 PROCEDURE — 77387 GUIDANCE FOR RADJ TX DLVR: CPT | Performed by: RADIOLOGY

## 2024-05-01 PROCEDURE — 77412 RADIATION TX DELIVERY LVL 3: CPT | Performed by: RADIOLOGY

## 2024-05-02 ENCOUNTER — HOSPITAL ENCOUNTER (OUTPATIENT)
Dept: RADIATION ONCOLOGY | Age: 59
Discharge: HOME | End: 2024-05-02
Payer: COMMERCIAL

## 2024-05-02 PROCEDURE — 77387 GUIDANCE FOR RADJ TX DLVR: CPT | Performed by: RADIOLOGY

## 2024-05-02 PROCEDURE — 77412 RADIATION TX DELIVERY LVL 3: CPT | Performed by: RADIOLOGY

## 2024-05-03 ENCOUNTER — HOSPITAL ENCOUNTER (OUTPATIENT)
Dept: RADIATION ONCOLOGY | Age: 59
Discharge: HOME | End: 2024-05-03
Payer: COMMERCIAL

## 2024-05-03 PROCEDURE — 77387 GUIDANCE FOR RADJ TX DLVR: CPT | Performed by: RADIOLOGY

## 2024-05-03 PROCEDURE — 77412 RADIATION TX DELIVERY LVL 3: CPT | Performed by: RADIOLOGY

## 2024-05-17 ENCOUNTER — OFFICE VISIT (OUTPATIENT)
Dept: CARDIOLOGY CLINIC | Age: 59
End: 2024-05-17
Payer: COMMERCIAL

## 2024-05-17 VITALS
BODY MASS INDEX: 30.48 KG/M2 | DIASTOLIC BLOOD PRESSURE: 70 MMHG | SYSTOLIC BLOOD PRESSURE: 114 MMHG | WEIGHT: 205.8 LBS | HEART RATE: 74 BPM | HEIGHT: 69 IN

## 2024-05-17 DIAGNOSIS — I45.10 RBBB: Primary | ICD-10-CM

## 2024-05-17 PROCEDURE — 3074F SYST BP LT 130 MM HG: CPT | Performed by: NUCLEAR MEDICINE

## 2024-05-17 PROCEDURE — 99214 OFFICE O/P EST MOD 30 MIN: CPT | Performed by: NUCLEAR MEDICINE

## 2024-05-17 PROCEDURE — 3078F DIAST BP <80 MM HG: CPT | Performed by: NUCLEAR MEDICINE

## 2024-05-17 RX ORDER — OXYCODONE HYDROCHLORIDE 5 MG/1
5 TABLET ORAL EVERY 6 HOURS PRN
COMMUNITY
Start: 2024-05-03

## 2024-05-17 NOTE — PROGRESS NOTES
[facility] [place] [facility]  [facility] CARDIOLOGY  [address]  [address]  [place] [facility] [address]  Dept: [phone]  Dept Fax: [phone]  Loc: [phone]    Visit Date: 2024    Ousmane Lockett is a 58 y.o. male who presents todayfor:  Chief Complaint   Patient presents with    Follow-up     Wants to get cleared cardiac wise just in case he needs testing    Not seen in over 2 years   Diagnosed with bone carcinoma of unknown origin   Work up still in progress  Known renal transplant   Had it   No know CAD  Know RBBB  No chest pain   Some baseline dyspnea  Might be worse  Know borderline to labile HTN   History of NH lymphoma as well       HPI:  HPI  Past Medical History:   Diagnosis Date    CVA (cerebral vascular accident) (HCC)     with chemo - residual numbness in left hand    Hematoma     right leg s/p fall    Hx of blood clots     Arm    Hyperlipidemia     Hypertension     Non Hodgkin's lymphoma (HCC)         Renal transplant recipient     [facility]      Past Surgical History:   Procedure Laterality Date    APPENDECTOMY          BONE MARROW TRANSPLANT      [facility] transplant once a year    CHOLECYSTECTOMY, LAPAROSCOPIC N/A 2021    CHOLECYSTECTOMY LAPAROSCOPIC performed by Abdi Carmona MD at [facility] OR    COLONOSCOPY      FACIAL SURGERY N/A 10/28/2020    WIDER EXCISION MELANOMA VERTEX OF SCALP performed by Tera Perez MD at [facility] OR    KIDNEY TRANSPLANT  2000    OTHER SURGICAL HISTORY      CAPD catheter insertion    OTHER SURGICAL HISTORY  2000    CAPD cath removal-Dr Villeda     Family History   Problem Relation Age of Onset    Cancer Mother 63        colon - is now      Heart Failure Father         heart attack w/stents    Diabetes Sister         5 years ago diagnosed    Diabetes Brother         5 years ago diagnosed     Social History     Tobacco Use    Smoking status: Never

## 2024-06-12 ENCOUNTER — HOSPITAL ENCOUNTER (OUTPATIENT)
Dept: CT IMAGING | Age: 59
Discharge: HOME | End: 2024-06-12
Attending: RADIOLOGY
Payer: COMMERCIAL

## 2024-06-12 ENCOUNTER — HOSPITAL ENCOUNTER (OUTPATIENT)
Age: 59
Discharge: HOME | End: 2024-06-14
Attending: NUCLEAR MEDICINE
Payer: COMMERCIAL

## 2024-06-12 ENCOUNTER — HOSPITAL ENCOUNTER (OUTPATIENT)
Dept: GENERAL RADIOLOGY | Age: 59
Discharge: HOME | End: 2024-06-12

## 2024-06-12 ENCOUNTER — HOSPITAL ENCOUNTER (OUTPATIENT)
Dept: NUCLEAR MEDICINE | Age: 59
Discharge: HOME | End: 2024-06-12

## 2024-06-12 VITALS
DIASTOLIC BLOOD PRESSURE: 70 MMHG | SYSTOLIC BLOOD PRESSURE: 114 MMHG | WEIGHT: 205 LBS | HEIGHT: 69 IN | BODY MASS INDEX: 30.36 KG/M2

## 2024-06-12 DIAGNOSIS — R69 DIAGNOSIS UNKNOWN: ICD-10-CM

## 2024-06-12 DIAGNOSIS — I45.10 RBBB: ICD-10-CM

## 2024-06-12 DIAGNOSIS — Z00.6 EXAMINATION FOR NORMAL COMPARISON OR CONTROL IN CLINICAL RESEARCH: ICD-10-CM

## 2024-06-12 LAB
ECHO AO ASC DIAM: 2.8 CM
ECHO AO ASCENDING AORTA INDEX: 1.34 CM/M2
ECHO AO SINUS VALSALVA DIAM: 3.1 CM
ECHO AO SINUS VALSALVA INDEX: 1.48 CM/M2
ECHO AO ST JNCT DIAM: 2.5 CM
ECHO AV CUSP MM: 2.1 CM
ECHO AV MEAN GRADIENT: 5 MMHG
ECHO AV MEAN VELOCITY: 1.1 M/S
ECHO AV PEAK GRADIENT: 9 MMHG
ECHO AV PEAK VELOCITY: 1.5 M/S
ECHO AV VELOCITY RATIO: 0.8
ECHO AV VTI: 31 CM
ECHO BSA: 2.13 M2
ECHO EST RA PRESSURE: 10 MMHG
ECHO LA AREA 2C: 16.1 CM2
ECHO LA AREA 4C: 14.2 CM2
ECHO LA DIAMETER INDEX: 1.67 CM/M2
ECHO LA DIAMETER: 3.5 CM
ECHO LA MAJOR AXIS: 4.3 CM
ECHO LA MINOR AXIS: 6.1 CM
ECHO LA VOL MOD A2C: 35 ML (ref 18–58)
ECHO LA VOL MOD A4C: 37 ML (ref 18–58)
ECHO LA VOLUME INDEX MOD A2C: 17 ML/M2 (ref 16–34)
ECHO LA VOLUME INDEX MOD A4C: 18 ML/M2 (ref 16–34)
ECHO LV E' LATERAL VELOCITY: 10 CM/S
ECHO LV E' SEPTAL VELOCITY: 9 CM/S
ECHO LV FRACTIONAL SHORTENING: 34 % (ref 28–44)
ECHO LV INTERNAL DIMENSION DIASTOLE INDEX: 2.11 CM/M2
ECHO LV INTERNAL DIMENSION DIASTOLIC: 4.4 CM (ref 4.2–5.9)
ECHO LV INTERNAL DIMENSION SYSTOLIC INDEX: 1.39 CM/M2
ECHO LV INTERNAL DIMENSION SYSTOLIC: 2.9 CM
ECHO LV ISOVOLUMETRIC RELAXATION TIME (IVRT): 74 MS
ECHO LV IVSD: 1 CM (ref 0.6–1)
ECHO LV MASS 2D: 147.8 G (ref 88–224)
ECHO LV MASS INDEX 2D: 70.7 G/M2 (ref 49–115)
ECHO LV POSTERIOR WALL DIASTOLIC: 1 CM (ref 0.6–1)
ECHO LV RELATIVE WALL THICKNESS RATIO: 0.45
ECHO LVOT AV VTI INDEX: 0.84
ECHO LVOT MEAN GRADIENT: 3 MMHG
ECHO LVOT PEAK GRADIENT: 6 MMHG
ECHO LVOT PEAK VELOCITY: 1.2 M/S
ECHO LVOT VTI: 26 CM
ECHO MV A VELOCITY: 0.94 M/S
ECHO MV E DECELERATION TIME (DT): 243 MS
ECHO MV E VELOCITY: 0.96 M/S
ECHO MV E/A RATIO: 1.02
ECHO MV E/E' LATERAL: 9.6
ECHO MV E/E' RATIO (AVERAGED): 10.13
ECHO MV E/E' SEPTAL: 10.67
ECHO MV REGURGITANT PEAK GRADIENT: 85 MMHG
ECHO MV REGURGITANT PEAK VELOCITY: 4.6 M/S
ECHO PV MAX VELOCITY: 0.8 M/S
ECHO PV PEAK GRADIENT: 2 MMHG
ECHO RIGHT VENTRICULAR SYSTOLIC PRESSURE (RVSP): 39 MMHG
ECHO RV FREE WALL PEAK S': 14 CM/S
ECHO RV INTERNAL DIMENSION: 2.9 CM
ECHO RV TAPSE: 3 CM (ref 1.7–?)
ECHO TV E WAVE: 0.6 M/S
ECHO TV REGURGITANT MAX VELOCITY: 2.7 M/S
ECHO TV REGURGITANT PEAK GRADIENT: 29 MMHG

## 2024-06-12 PROCEDURE — 93306 TTE W/DOPPLER COMPLETE: CPT

## 2024-06-13 ENCOUNTER — HOSPITAL ENCOUNTER (OUTPATIENT)
Dept: CT IMAGING | Age: 59
Discharge: HOME | End: 2024-06-13
Attending: RADIOLOGY

## 2024-06-13 DIAGNOSIS — Z00.6 EXAMINATION FOR NORMAL COMPARISON OR CONTROL IN CLINICAL RESEARCH: ICD-10-CM

## 2024-06-17 ENCOUNTER — HOSPITAL ENCOUNTER (OUTPATIENT)
Dept: GENERAL RADIOLOGY | Age: 59
Discharge: HOME | End: 2024-06-17

## 2024-06-17 DIAGNOSIS — Z00.6 ENCOUNTER FOR EXAMINATION FOR NORMAL COMPARISON OR CONTROL IN CLINICAL RESEARCH PROGRAM: ICD-10-CM

## 2024-06-18 ENCOUNTER — HOSPITAL ENCOUNTER (OUTPATIENT)
Dept: RADIATION ONCOLOGY | Age: 59
Discharge: HOME | End: 2024-06-18
Payer: COMMERCIAL

## 2024-06-18 VITALS
OXYGEN SATURATION: 97 % | HEART RATE: 79 BPM | DIASTOLIC BLOOD PRESSURE: 68 MMHG | SYSTOLIC BLOOD PRESSURE: 147 MMHG | RESPIRATION RATE: 18 BRPM | BODY MASS INDEX: 31.31 KG/M2 | WEIGHT: 212 LBS | TEMPERATURE: 98.2 F

## 2024-06-18 DIAGNOSIS — C34.31 PRIMARY MALIGNANT NEOPLASM OF RIGHT LOWER LOBE OF LUNG (HCC): Primary | ICD-10-CM

## 2024-06-18 PROCEDURE — 99212 OFFICE O/P EST SF 10 MIN: CPT

## 2024-06-18 ASSESSMENT — ENCOUNTER SYMPTOMS
COUGH: 0
VOMITING: 0
TROUBLE SWALLOWING: 0
BACK PAIN: 0
SHORTNESS OF BREATH: 0
ABDOMINAL PAIN: 0
ABDOMINAL DISTENTION: 0
NAUSEA: 0
BLOOD IN STOOL: 0

## 2024-06-18 NOTE — PROGRESS NOTES
[facility]           [address]        [place], [place] [address]        O: [phone]        F: [phone]       [facility]            FOLLOW UP NOTE    Date of Service: 2024  Patient ID: Ousmane Lockett   : 1965  MRN: [id number]   Acct Number: [id number]       DATE OF SERVICE: 2024   LOCATION: [facility]  PROVIDER: Florence Lozano PA-C    FOLLOW UP PHYSICIANS: Dr. Quinn Lovelace ([facility]), Dr. Wong ([facility]), Dr. Rehan Reno ([facility])    ASSESSMENT AND PLAN:  Metastatic carcinoma involving bone with unknown primary site (HCC)  [C79.51, C80.1]    - Ousmane Lockett is a 58 y.o. male who presents today with his wife for regularly-scheduled follow-up for his right shoulder radiation. He completed palliative radiation to the right humerus metastasis on 5/3/24  - The patient appears to be doing well after radiation. He reports that his shoulder pain and tenderness and range of motion improved with radiation. He denies skin changes or swelling of the right upper extremity. He states Dr. 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 With his multiple cancers and rising Ca 19-9, I recommended discussing with [facility] medical oncology the possibility of obtaining mammogram, GI referral, and genetic testing. Patient is scheduled for upcoming MRI brain  - Continue to follow with medical oncology, orthopedics, thoracic surgery, and all other medical providers. Continue imaging and work-up as per [facility]  - I advised the patient to call with any new/worsening symptoms that may arise before their return visit; the patient voiced understanding and agreement    Assessment and plan formulated in conjunction with / under the guidance 
Statement Selected

## 2024-08-19 RX ORDER — MYCOPHENOLATE MOFETIL 250 MG/1
1000 CAPSULE ORAL 2 TIMES DAILY
Qty: 720 CAPSULE | Refills: 4 | Status: SHIPPED | OUTPATIENT
Start: 2024-08-19

## 2024-08-26 ENCOUNTER — TELEPHONE (OUTPATIENT)
Dept: NEPHROLOGY | Age: 59
End: 2024-08-26

## 2024-08-26 NOTE — TELEPHONE ENCOUNTER
Patient is a kidney transplant patient and taking two immunosuppressant medications (Cellcept & prednisone).  Recently diagnosed with cancer and they would like to start him on immunotherapy treatments.  First treatment is scheduled for 9/3/2024.  Last labs on 8/13/24 BUN 18, Creat 0.88.  Patient wanting to know if he should be seen to discuss these treatments prior to them starting on 9/3.  Please advise.

## 2024-09-10 ENCOUNTER — HOSPITAL ENCOUNTER (OUTPATIENT)
Dept: CT IMAGING | Age: 59
Discharge: HOME | End: 2024-09-10
Attending: RADIOLOGY

## 2024-09-10 ENCOUNTER — HOSPITAL ENCOUNTER (OUTPATIENT)
Dept: MRI IMAGING | Age: 59
Discharge: HOME | End: 2024-09-10
Attending: RADIOLOGY

## 2024-09-10 DIAGNOSIS — Z00.6 EXAMINATION FOR NORMAL COMPARISON FOR CLINICAL RESEARCH: ICD-10-CM

## 2024-09-12 ENCOUNTER — OFFICE VISIT (OUTPATIENT)
Dept: NEPHROLOGY | Age: 59
End: 2024-09-12
Payer: COMMERCIAL

## 2024-09-12 VITALS
DIASTOLIC BLOOD PRESSURE: 94 MMHG | BODY MASS INDEX: 31.45 KG/M2 | HEART RATE: 94 BPM | SYSTOLIC BLOOD PRESSURE: 160 MMHG | WEIGHT: 213 LBS | OXYGEN SATURATION: 98 %

## 2024-09-12 DIAGNOSIS — Z94.0 KIDNEY TRANSPLANT RECIPIENT: Primary | ICD-10-CM

## 2024-09-12 PROCEDURE — 3077F SYST BP >= 140 MM HG: CPT | Performed by: INTERNAL MEDICINE

## 2024-09-12 PROCEDURE — 3080F DIAST BP >= 90 MM HG: CPT | Performed by: INTERNAL MEDICINE

## 2024-09-12 PROCEDURE — 99213 OFFICE O/P EST LOW 20 MIN: CPT | Performed by: INTERNAL MEDICINE

## 2024-09-12 RX ORDER — SIROLIMUS 1 MG/1
1 TABLET, FILM COATED ORAL DAILY
COMMUNITY

## 2024-09-17 ENCOUNTER — HOSPITAL ENCOUNTER (OUTPATIENT)
Dept: RADIATION ONCOLOGY | Age: 59
Discharge: HOME | End: 2024-09-17
Payer: COMMERCIAL

## 2024-09-17 VITALS
BODY MASS INDEX: 31.9 KG/M2 | HEART RATE: 91 BPM | TEMPERATURE: 98.4 F | WEIGHT: 216 LBS | DIASTOLIC BLOOD PRESSURE: 74 MMHG | OXYGEN SATURATION: 97 % | SYSTOLIC BLOOD PRESSURE: 164 MMHG | RESPIRATION RATE: 18 BRPM

## 2024-09-17 PROCEDURE — 99212 OFFICE O/P EST SF 10 MIN: CPT

## 2024-09-17 PROCEDURE — 99213 OFFICE O/P EST LOW 20 MIN: CPT

## 2024-09-17 ASSESSMENT — ENCOUNTER SYMPTOMS
SORE THROAT: 0
COUGH: 1
VOICE CHANGE: 0
BLOOD IN STOOL: 0
ABDOMINAL PAIN: 0
CONSTIPATION: 0
DIARRHEA: 0
VOMITING: 0
TROUBLE SWALLOWING: 0
NAUSEA: 0
ABDOMINAL DISTENTION: 0
BACK PAIN: 0
COLOR CHANGE: 0

## 2024-09-24 ENCOUNTER — HOSPITAL ENCOUNTER (OUTPATIENT)
Dept: OCCUPATIONAL THERAPY | Age: 59
Setting detail: THERAPIES SERIES
Discharge: HOME | End: 2024-09-24
Payer: COMMERCIAL

## 2024-09-24 PROCEDURE — 97110 THERAPEUTIC EXERCISES: CPT

## 2024-09-24 PROCEDURE — 97166 OT EVAL MOD COMPLEX 45 MIN: CPT

## 2024-09-27 ENCOUNTER — APPOINTMENT (OUTPATIENT)
Dept: GENERAL RADIOLOGY | Age: 59
End: 2024-09-27
Payer: COMMERCIAL

## 2024-09-27 ENCOUNTER — HOSPITAL ENCOUNTER (OUTPATIENT)
Dept: OCCUPATIONAL THERAPY | Age: 59
Setting detail: THERAPIES SERIES
Discharge: HOME | End: 2024-09-27
Payer: COMMERCIAL

## 2024-09-27 ENCOUNTER — HOSPITAL ENCOUNTER (EMERGENCY)
Age: 59
Discharge: HOME | End: 2024-09-27
Attending: FAMILY MEDICINE
Payer: COMMERCIAL

## 2024-09-27 VITALS
SYSTOLIC BLOOD PRESSURE: 118 MMHG | HEART RATE: 84 BPM | DIASTOLIC BLOOD PRESSURE: 74 MMHG | OXYGEN SATURATION: 97 % | TEMPERATURE: 97.5 F | RESPIRATION RATE: 19 BRPM

## 2024-09-27 DIAGNOSIS — J18.9 PNEUMONIA OF LEFT LOWER LOBE DUE TO INFECTIOUS ORGANISM: Primary | ICD-10-CM

## 2024-09-27 LAB
ALBUMIN SERPL BCP-MCNC: 2.7 GM/DL (ref 3.4–5)
ALP SERPL-CCNC: 112 U/L (ref 46–116)
ALT SERPL W P-5'-P-CCNC: 32 U/L (ref 14–63)
ANION GAP SERPL CALC-SCNC: 8 MEQ/L (ref 8–16)
AST SERPL W P-5'-P-CCNC: 21 U/L (ref 15–37)
BASOPHILS # BLD: 0.2 % (ref 0–3)
BASOPHILS ABSOLUTE: 0 THOU/MM3 (ref 0–0.1)
BILIRUB SERPL-MCNC: 0.3 MG/DL (ref 0.2–1)
BUN SERPL-MCNC: 14 MG/DL (ref 7–18)
CALCIUM SERPL-MCNC: 8.7 MG/DL (ref 8.5–10.1)
CHLORIDE SERPL-SCNC: 101 MEQ/L (ref 98–107)
CO2 SERPL-SCNC: 27 MEQ/L (ref 21–32)
CREAT SERPL-MCNC: 1 MG/DL (ref 0.6–1.3)
EOSINOPHILS ABSOLUTE: 0 THOU/MM3 (ref 0–0.5)
EOSINOPHILS RELATIVE PERCENT: 0.4 % (ref 0–4)
GFR SERPL CREATININE-BSD FRML MDRD: 87 ML/MIN/1.73M2
GLUCOSE SERPL-MCNC: 142 MG/DL (ref 74–106)
HCT VFR BLD CALC: 36.6 % (ref 42–52)
HEMOGLOBIN: 10.6 GM/DL (ref 14–18)
IMMATURE GRANS (ABS): 0 THOU/MM3 (ref 0–0.07)
IMMATURE GRANULOCYTES %: 0 %
LYMPHOCYTES # BLD AUTO: 20.5 % (ref 15–47)
LYMPHOCYTES ABSOLUTE: 1.2 THOU/MM3 (ref 1–4.8)
MCH RBC QN AUTO: 23.8 PG (ref 26–32)
MCHC RBC AUTO-ENTMCNC: 29 GM/DL (ref 31–35)
MCV RBC AUTO: 82.1 FL (ref 80–94)
MONOCYTES: 0.5 THOU/MM3 (ref 0.3–1.3)
MONOCYTES: 8.7 % (ref 0–12)
NEUTROPHILS ABSOLUTE: 4 THOU/MM3 (ref 1.8–7.7)
PDW BLD-RTO: 14.6 % (ref 11.5–14.9)
PLATELET # BLD AUTO: 287 THOU/MM3 (ref 130–400)
PMV BLD AUTO: 8.8 FL (ref 9.4–12.4)
POTASSIUM SERPL-SCNC: 4.1 MEQ/L (ref 3.5–5.1)
PROT SERPL-MCNC: 6 GM/DL (ref 6.4–8.2)
RBC # BLD: 4.46 MILL/MM3 (ref 4.5–6.1)
SEG NEUTROPHILS: 69.8 % (ref 43–75)
SODIUM SERPL-SCNC: 136 MEQ/L (ref 136–145)
WBC # BLD: 5.7 THOU/MM3 (ref 4.8–10.8)

## 2024-09-27 PROCEDURE — 80053 COMPREHEN METABOLIC PANEL: CPT

## 2024-09-27 PROCEDURE — 97110 THERAPEUTIC EXERCISES: CPT

## 2024-09-27 PROCEDURE — 85025 COMPLETE CBC W/AUTO DIFF WBC: CPT

## 2024-09-27 PROCEDURE — 87040 BLOOD CULTURE FOR BACTERIA: CPT

## 2024-09-27 PROCEDURE — 71046 X-RAY EXAM CHEST 2 VIEWS: CPT

## 2024-09-27 PROCEDURE — 36415 COLL VENOUS BLD VENIPUNCTURE: CPT

## 2024-09-27 PROCEDURE — 99284 EMERGENCY DEPT VISIT MOD MDM: CPT

## 2024-09-27 RX ORDER — LEVOFLOXACIN 500 MG/1
500 TABLET, FILM COATED ORAL DAILY
Qty: 7 TABLET | Refills: 0 | Status: ON HOLD | OUTPATIENT
Start: 2024-09-27 | End: 2024-10-04

## 2024-09-27 ASSESSMENT — ENCOUNTER SYMPTOMS
VOMITING: 0
SHORTNESS OF BREATH: 0
COUGH: 0
SORE THROAT: 0
ABDOMINAL PAIN: 0
NAUSEA: 0

## 2024-09-27 ASSESSMENT — PAIN - FUNCTIONAL ASSESSMENT: PAIN_FUNCTIONAL_ASSESSMENT: NONE - DENIES PAIN

## 2024-09-29 ENCOUNTER — APPOINTMENT (OUTPATIENT)
Dept: CT IMAGING | Age: 59
DRG: 180 | End: 2024-09-29
Payer: COMMERCIAL

## 2024-09-29 ENCOUNTER — HOSPITAL ENCOUNTER (INPATIENT)
Age: 59
LOS: 2 days | Discharge: HOME | DRG: 180 | End: 2024-10-01
Attending: EMERGENCY MEDICINE | Admitting: PHYSICIAN ASSISTANT
Payer: COMMERCIAL

## 2024-09-29 ENCOUNTER — APPOINTMENT (OUTPATIENT)
Dept: GENERAL RADIOLOGY | Age: 59
DRG: 180 | End: 2024-09-29
Payer: COMMERCIAL

## 2024-09-29 DIAGNOSIS — Z85.118 HISTORY OF LUNG CANCER: ICD-10-CM

## 2024-09-29 DIAGNOSIS — R79.89 ELEVATED TROPONIN: ICD-10-CM

## 2024-09-29 DIAGNOSIS — J90 RECURRENT RIGHT PLEURAL EFFUSION: Primary | ICD-10-CM

## 2024-09-29 LAB
ALBUMIN SERPL BCG-MCNC: 3.5 G/DL (ref 3.5–5.1)
ALP SERPL-CCNC: 110 U/L (ref 38–126)
ALT SERPL W/O P-5'-P-CCNC: 31 U/L (ref 11–66)
ANION GAP SERPL CALC-SCNC: 15 MEQ/L (ref 8–16)
AST SERPL-CCNC: 22 U/L (ref 5–40)
B PERT DNA NPH QL NAA+PROBE: NOT DETECTED
BACTERIA BLD AEROBE CULT: NORMAL
BACTERIA BLD AEROBE CULT: NORMAL
BASOPHILS ABSOLUTE: 0 THOU/MM3 (ref 0–0.1)
BASOPHILS NFR BLD AUTO: 0.3 %
BILIRUB CONJ SERPL-MCNC: < 0.1 MG/DL (ref 0.1–13.8)
BILIRUB SERPL-MCNC: 0.2 MG/DL (ref 0.3–1.2)
BILIRUB UR QL STRIP.AUTO: NEGATIVE
BORDETELLA PARAPERTUSSIS BY PCR: NOT DETECTED
BUN SERPL-MCNC: 13 MG/DL (ref 7–22)
C PNEUM DNA SPEC QL NAA+PROBE: NOT DETECTED
CALCIUM SERPL-MCNC: 8.6 MG/DL (ref 8.5–10.5)
CHARACTER UR: CLEAR
CHLORIDE SERPL-SCNC: 99 MEQ/L (ref 98–111)
CO2 SERPL-SCNC: 22 MEQ/L (ref 23–33)
COLOR, UA: YELLOW
CREAT SERPL-MCNC: 0.8 MG/DL (ref 0.4–1.2)
DEPRECATED RDW RBC AUTO: 44.1 FL (ref 35–45)
EKG ATRIAL RATE: 93 BPM
EKG P AXIS: 53 DEGREES
EKG P-R INTERVAL: 134 MS
EKG Q-T INTERVAL: 348 MS
EKG QRS DURATION: 90 MS
EKG QTC CALCULATION (BAZETT): 432 MS
EKG R AXIS: -42 DEGREES
EKG T AXIS: 44 DEGREES
EKG VENTRICULAR RATE: 93 BPM
EOSINOPHIL NFR BLD AUTO: 0.9 %
EOSINOPHILS ABSOLUTE: 0.1 THOU/MM3 (ref 0–0.4)
ERYTHROCYTE [DISTWIDTH] IN BLOOD BY AUTOMATED COUNT: 14.9 % (ref 11.5–14.5)
FLUAV RNA NPH QL NAA+PROBE: NOT DETECTED
FLUAV RNA RESP QL NAA+PROBE: NOT DETECTED
FLUBV RNA NPH QL NAA+PROBE: NOT DETECTED
FLUBV RNA RESP QL NAA+PROBE: NOT DETECTED
GFR SERPL CREATININE-BSD FRML MDRD: > 90 ML/MIN/1.73M2
GLUCOSE SERPL-MCNC: 137 MG/DL (ref 70–108)
GLUCOSE UR QL STRIP.AUTO: NEGATIVE MG/DL
HADV DNA NPH QL NAA+PROBE: NOT DETECTED
HCOV 229E RNA SPEC QL NAA+PROBE: NOT DETECTED
HCOV HKU1 RNA SPEC QL NAA+PROBE: NOT DETECTED
HCOV NL63 RNA SPEC QL NAA+PROBE: NOT DETECTED
HCOV OC43 RNA SPEC QL NAA+PROBE: NOT DETECTED
HCT VFR BLD AUTO: 39.1 % (ref 42–52)
HGB BLD-MCNC: 11.4 GM/DL (ref 14–18)
HGB UR QL STRIP.AUTO: NEGATIVE
HMPV RNA NPH QL NAA+PROBE: NOT DETECTED
HPIV1 RNA NPH QL NAA+PROBE: NOT DETECTED
HPIV2 RNA NPH QL NAA+PROBE: NOT DETECTED
HPIV3 RNA NPH QL NAA+PROBE: NOT DETECTED
HPIV4 RNA NPH QL NAA+PROBE: NOT DETECTED
IMM GRANULOCYTES # BLD AUTO: 0.05 THOU/MM3 (ref 0–0.07)
IMM GRANULOCYTES NFR BLD AUTO: 0.7 %
KETONES UR QL STRIP.AUTO: NEGATIVE
LACTIC ACID, SEPSIS: 1.2 MMOL/L (ref 0.5–1.9)
LACTIC ACID, SEPSIS: 2.4 MMOL/L (ref 0.5–1.9)
LYMPHOCYTES ABSOLUTE: 1.6 THOU/MM3 (ref 1–4.8)
LYMPHOCYTES NFR BLD AUTO: 23.5 %
M PNEUMO DNA SPEC QL NAA+PROBE: NOT DETECTED
MCH RBC QN AUTO: 23.8 PG (ref 26–33)
MCHC RBC AUTO-ENTMCNC: 29.2 GM/DL (ref 32.2–35.5)
MCV RBC AUTO: 81.6 FL (ref 80–94)
MONOCYTES ABSOLUTE: 0.7 THOU/MM3 (ref 0.4–1.3)
MONOCYTES NFR BLD AUTO: 10.2 %
NEUTROPHILS ABSOLUTE: 4.5 THOU/MM3 (ref 1.8–7.7)
NEUTROPHILS NFR BLD AUTO: 64.4 %
NITRITE UR QL STRIP: NEGATIVE
NRBC BLD AUTO-RTO: 0 /100 WBC
OSMOLALITY SERPL CALC.SUM OF ELEC: 274.2 MOSMOL/KG (ref 275–300)
PH UR STRIP.AUTO: 6.5 [PH] (ref 5–9)
PLATELET # BLD AUTO: 337 THOU/MM3 (ref 130–400)
PMV BLD AUTO: 8.5 FL (ref 9.4–12.4)
POTASSIUM SERPL-SCNC: 4.5 MEQ/L (ref 3.5–5.2)
PROCALCITONIN SERPL IA-MCNC: 0.11 NG/ML (ref 0.01–0.09)
PROT SERPL-MCNC: 6 G/DL (ref 6.1–8)
PROT UR STRIP.AUTO-MCNC: NEGATIVE MG/DL
RBC # BLD AUTO: 4.79 MILL/MM3 (ref 4.7–6.1)
RSV RNA NPH QL NAA+PROBE: NOT DETECTED
RV+EV RNA SPEC QL NAA+PROBE: NOT DETECTED
SARS-COV-2 RNA NPH QL NAA+NON-PROBE: NOT DETECTED
SARS-COV-2 RNA RESP QL NAA+PROBE: NOT DETECTED
SODIUM SERPL-SCNC: 136 MEQ/L (ref 135–145)
SP GR UR REFRACT.AUTO: 1.01 (ref 1–1.03)
TROPONIN, HIGH SENSITIVITY: 22 NG/L (ref 0–12)
TROPONIN, HIGH SENSITIVITY: 28 NG/L (ref 0–12)
UROBILINOGEN, URINE: 0.2 EU/DL (ref 0–1)
WBC # BLD AUTO: 7 THOU/MM3 (ref 4.8–10.8)
WBC #/AREA URNS HPF: NEGATIVE /[HPF]

## 2024-09-29 PROCEDURE — 81003 URINALYSIS AUTO W/O SCOPE: CPT

## 2024-09-29 PROCEDURE — 87040 BLOOD CULTURE FOR BACTERIA: CPT

## 2024-09-29 PROCEDURE — 87641 MR-STAPH DNA AMP PROBE: CPT

## 2024-09-29 PROCEDURE — 96374 THER/PROPH/DIAG INJ IV PUSH: CPT

## 2024-09-29 PROCEDURE — 6360000002 HC RX W HCPCS

## 2024-09-29 PROCEDURE — 85025 COMPLETE CBC W/AUTO DIFF WBC: CPT

## 2024-09-29 PROCEDURE — 99285 EMERGENCY DEPT VISIT HI MDM: CPT

## 2024-09-29 PROCEDURE — 93010 ELECTROCARDIOGRAM REPORT: CPT | Performed by: NUCLEAR MEDICINE

## 2024-09-29 PROCEDURE — 0202U NFCT DS 22 TRGT SARS-COV-2: CPT

## 2024-09-29 PROCEDURE — 1200000003 HC TELEMETRY R&B

## 2024-09-29 PROCEDURE — 36415 COLL VENOUS BLD VENIPUNCTURE: CPT

## 2024-09-29 PROCEDURE — 82248 BILIRUBIN DIRECT: CPT

## 2024-09-29 PROCEDURE — 6360000004 HC RX CONTRAST MEDICATION: Performed by: EMERGENCY MEDICINE

## 2024-09-29 PROCEDURE — 80053 COMPREHEN METABOLIC PANEL: CPT

## 2024-09-29 PROCEDURE — 84484 ASSAY OF TROPONIN QUANT: CPT

## 2024-09-29 PROCEDURE — 84145 PROCALCITONIN (PCT): CPT

## 2024-09-29 PROCEDURE — 87636 SARSCOV2 & INF A&B AMP PRB: CPT

## 2024-09-29 PROCEDURE — 2580000003 HC RX 258

## 2024-09-29 PROCEDURE — 94669 MECHANICAL CHEST WALL OSCILL: CPT

## 2024-09-29 PROCEDURE — 83605 ASSAY OF LACTIC ACID: CPT

## 2024-09-29 PROCEDURE — 99223 1ST HOSP IP/OBS HIGH 75: CPT | Performed by: STUDENT IN AN ORGANIZED HEALTH CARE EDUCATION/TRAINING PROGRAM

## 2024-09-29 PROCEDURE — 6370000000 HC RX 637 (ALT 250 FOR IP)

## 2024-09-29 PROCEDURE — 2580000003 HC RX 258: Performed by: EMERGENCY MEDICINE

## 2024-09-29 PROCEDURE — 6360000002 HC RX W HCPCS: Performed by: STUDENT IN AN ORGANIZED HEALTH CARE EDUCATION/TRAINING PROGRAM

## 2024-09-29 PROCEDURE — 71275 CT ANGIOGRAPHY CHEST: CPT

## 2024-09-29 PROCEDURE — 6360000002 HC RX W HCPCS: Performed by: EMERGENCY MEDICINE

## 2024-09-29 PROCEDURE — 71046 X-RAY EXAM CHEST 2 VIEWS: CPT

## 2024-09-29 PROCEDURE — 93005 ELECTROCARDIOGRAM TRACING: CPT | Performed by: EMERGENCY MEDICINE

## 2024-09-29 RX ORDER — ACETAMINOPHEN 325 MG/1
650 TABLET ORAL EVERY 6 HOURS PRN
Status: DISCONTINUED | OUTPATIENT
Start: 2024-09-29 | End: 2024-10-01 | Stop reason: HOSPADM

## 2024-09-29 RX ORDER — SODIUM CHLORIDE 0.9 % (FLUSH) 0.9 %
5-40 SYRINGE (ML) INJECTION EVERY 12 HOURS SCHEDULED
Status: DISCONTINUED | OUTPATIENT
Start: 2024-09-29 | End: 2024-10-01 | Stop reason: HOSPADM

## 2024-09-29 RX ORDER — SODIUM CHLORIDE 9 MG/ML
INJECTION, SOLUTION INTRAVENOUS CONTINUOUS
Status: ACTIVE | OUTPATIENT
Start: 2024-09-29 | End: 2024-09-30

## 2024-09-29 RX ORDER — SIROLIMUS 1 MG/1
1 TABLET, FILM COATED ORAL DAILY
Status: DISCONTINUED | OUTPATIENT
Start: 2024-09-29 | End: 2024-10-01 | Stop reason: HOSPADM

## 2024-09-29 RX ORDER — POTASSIUM CHLORIDE 1500 MG/1
40 TABLET, EXTENDED RELEASE ORAL PRN
Status: DISCONTINUED | OUTPATIENT
Start: 2024-09-29 | End: 2024-10-01 | Stop reason: HOSPADM

## 2024-09-29 RX ORDER — SODIUM CHLORIDE 9 MG/ML
INJECTION, SOLUTION INTRAVENOUS PRN
Status: DISCONTINUED | OUTPATIENT
Start: 2024-09-29 | End: 2024-10-01 | Stop reason: HOSPADM

## 2024-09-29 RX ORDER — IOPAMIDOL 755 MG/ML
80 INJECTION, SOLUTION INTRAVASCULAR
Status: COMPLETED | OUTPATIENT
Start: 2024-09-29 | End: 2024-09-29

## 2024-09-29 RX ORDER — SODIUM CHLORIDE 0.9 % (FLUSH) 0.9 %
5-40 SYRINGE (ML) INJECTION PRN
Status: DISCONTINUED | OUTPATIENT
Start: 2024-09-29 | End: 2024-10-01 | Stop reason: HOSPADM

## 2024-09-29 RX ORDER — 0.9 % SODIUM CHLORIDE 0.9 %
1000 INTRAVENOUS SOLUTION INTRAVENOUS ONCE
Status: COMPLETED | OUTPATIENT
Start: 2024-09-29 | End: 2024-09-29

## 2024-09-29 RX ORDER — ENOXAPARIN SODIUM 100 MG/ML
40 INJECTION SUBCUTANEOUS DAILY
Status: DISCONTINUED | OUTPATIENT
Start: 2024-09-29 | End: 2024-10-01 | Stop reason: HOSPADM

## 2024-09-29 RX ORDER — ONDANSETRON 4 MG/1
4 TABLET, ORALLY DISINTEGRATING ORAL EVERY 8 HOURS PRN
Status: DISCONTINUED | OUTPATIENT
Start: 2024-09-29 | End: 2024-10-01 | Stop reason: HOSPADM

## 2024-09-29 RX ORDER — PREDNISONE 10 MG/1
10 TABLET ORAL DAILY
Status: DISCONTINUED | OUTPATIENT
Start: 2024-09-29 | End: 2024-10-01 | Stop reason: HOSPADM

## 2024-09-29 RX ORDER — ONDANSETRON 2 MG/ML
4 INJECTION INTRAMUSCULAR; INTRAVENOUS EVERY 6 HOURS PRN
Status: DISCONTINUED | OUTPATIENT
Start: 2024-09-29 | End: 2024-10-01 | Stop reason: HOSPADM

## 2024-09-29 RX ORDER — POTASSIUM CHLORIDE 7.45 MG/ML
10 INJECTION INTRAVENOUS PRN
Status: DISCONTINUED | OUTPATIENT
Start: 2024-09-29 | End: 2024-10-01 | Stop reason: HOSPADM

## 2024-09-29 RX ORDER — MAGNESIUM SULFATE IN WATER 40 MG/ML
2000 INJECTION, SOLUTION INTRAVENOUS PRN
Status: DISCONTINUED | OUTPATIENT
Start: 2024-09-29 | End: 2024-10-01 | Stop reason: HOSPADM

## 2024-09-29 RX ORDER — ACETAMINOPHEN 650 MG/1
650 SUPPOSITORY RECTAL EVERY 6 HOURS PRN
Status: DISCONTINUED | OUTPATIENT
Start: 2024-09-29 | End: 2024-10-01 | Stop reason: HOSPADM

## 2024-09-29 RX ORDER — FUROSEMIDE 40 MG
20 TABLET ORAL 2 TIMES DAILY
Status: DISCONTINUED | OUTPATIENT
Start: 2024-09-29 | End: 2024-10-01 | Stop reason: HOSPADM

## 2024-09-29 RX ORDER — CALCIUM POLYCARBOPHIL 625 MG 625 MG/1
625 TABLET ORAL DAILY
Status: DISCONTINUED | OUTPATIENT
Start: 2024-09-29 | End: 2024-10-01 | Stop reason: HOSPADM

## 2024-09-29 RX ORDER — OXYCODONE HYDROCHLORIDE 5 MG/1
5 TABLET ORAL EVERY 6 HOURS PRN
Status: DISCONTINUED | OUTPATIENT
Start: 2024-09-29 | End: 2024-10-01 | Stop reason: HOSPADM

## 2024-09-29 RX ORDER — POLYETHYLENE GLYCOL 3350 17 G/17G
17 POWDER, FOR SOLUTION ORAL DAILY PRN
Status: DISCONTINUED | OUTPATIENT
Start: 2024-09-29 | End: 2024-10-01 | Stop reason: HOSPADM

## 2024-09-29 RX ORDER — SODIUM CHLORIDE 9 MG/ML
INJECTION, SOLUTION INTRAVENOUS CONTINUOUS
Status: DISCONTINUED | OUTPATIENT
Start: 2024-09-29 | End: 2024-09-29

## 2024-09-29 RX ADMIN — PIPERACILLIN AND TAZOBACTAM 4500 MG: 4; .5 INJECTION, POWDER, FOR SOLUTION INTRAVENOUS at 14:19

## 2024-09-29 RX ADMIN — PIPERACILLIN AND TAZOBACTAM 3375 MG: 3; .375 INJECTION, POWDER, FOR SOLUTION INTRAVENOUS at 20:29

## 2024-09-29 RX ADMIN — VANCOMYCIN HYDROCHLORIDE 2250 MG: 1 INJECTION, POWDER, LYOPHILIZED, FOR SOLUTION INTRAVENOUS at 15:12

## 2024-09-29 RX ADMIN — CALCIUM POLYCARBOPHIL 625 MG: 625 TABLET, FILM COATED ORAL at 20:30

## 2024-09-29 RX ADMIN — SODIUM CHLORIDE 1000 ML: 9 INJECTION, SOLUTION INTRAVENOUS at 13:30

## 2024-09-29 RX ADMIN — ENOXAPARIN SODIUM 40 MG: 100 INJECTION SUBCUTANEOUS at 22:02

## 2024-09-29 RX ADMIN — IOPAMIDOL 80 ML: 755 INJECTION, SOLUTION INTRAVENOUS at 12:09

## 2024-09-29 RX ADMIN — SODIUM CHLORIDE: 9 INJECTION, SOLUTION INTRAVENOUS at 16:45

## 2024-09-29 ASSESSMENT — PAIN SCALES - GENERAL: PAINLEVEL_OUTOF10: 0

## 2024-09-29 NOTE — ED NOTES
Pt medicated per orders. Updated on POC. Verbalized understanding. Denies other needs. Call light in reach.

## 2024-09-29 NOTE — ED PROVIDER NOTES
[facility]  EMERGENCY DEPARTMENT ENCOUNTER          Pt Name: Ousmane Lockett  MRN: [id number]  Birthdate 1965  Date of evaluation: 9/29/2024    CHIEF COMPLAINT       Chief Complaint   Patient presents with    Shortness of Breath       Nurses Notes reviewed and I agree except as noted in the HPI.    HISTORY OF PRESENT ILLNESS    Ousmane Lockett is a 59 y.o. pleasant male who presents to the emergency department for evaluation of worsening shortness of breath, also having cough and fever.  Patient was seen at [facility] on Friday and diagnosed with pneumonia.  He was started on Levaquin.  He is taking 1 dose daily for the past 3 days.  Despite taking the antibiotic he states his shortness of breath is worsened.  He is continuing to cough up clear mucus especially if he is up and moving around.  Denies chest pain or abdominal pain.  He has a history of non-Hodgkin's lymphoma in the 1990s, more recently he was diagnosed with right non-small cell lung carcinoma and had a right lobectomy in July at [facility].  Since July he has had 2 right-sided pleural effusions, the most recent effusion was drained in mid September.  He was released from [facility] on September 22 after his thoracocentesis for his pleural effusion was complicated by pneumothorax.  He did have chest tube placement and removal prior to discharge.  He reports he has been having fever that comes and goes since 22nd of September.  He does not feel like he is improved on Levaquin.  Today his temperature was 101.1 and he took Tylenol prior to arrival.  He gets short of breath just walking from the chair to the kitchen table.  He also reports some shortness of breath during conversation.  Denies nausea, vomiting, diarrhea, constipation.  Denies black or bloody stools.  Denies dysuria or hematuria.  Denies lower extremity pain or swelling.  He does report that in 1995 while he had a port for non-Hodgkin's  limits   COVID-19 & INFLUENZA COMBO   RESPIRATORY PANEL, MOLECULAR, WITH COVID-19   CULTURE, BLOOD 1   CULTURE, BLOOD 1   CULTURE, RESPIRATORY   CULTURE, MRSA, SCREENING   LACTATE, SEPSIS   URINALYSIS WITH REFLEX TO CULTURE   ANION GAP   GLOMERULAR FILTRATION RATE, ESTIMATED   BASIC METABOLIC PANEL   CBC   MAGNESIUM       (Any cultures that may have been sent were not resulted at the time of this patient visit)    ED Medications administered this visit:   Medications   sodium chloride flush 0.9 % injection 5-40 mL (has no administration in time range)   sodium chloride flush 0.9 % injection 5-40 mL (has no administration in time range)   0.9 % sodium chloride infusion (has no administration in time range)   potassium chloride (KLOR-CON M) extended release tablet 40 mEq (has no administration in time range)     Or   potassium bicarb-citric acid (EFFER-K) effervescent tablet 40 mEq (has no administration in time range)     Or   potassium chloride 10 mEq/100 mL IVPB (Peripheral Line) (has no administration in time range)   magnesium sulfate 2000 mg in 50 mL IVPB premix (has no administration in time range)   ondansetron (ZOFRAN-ODT) disintegrating tablet 4 mg (has no administration in time range)     Or   ondansetron (ZOFRAN) injection 4 mg (has no administration in time range)   polyethylene glycol (GLYCOLAX) packet 17 g (has no administration in time range)   acetaminophen (TYLENOL) tablet 650 mg (has no administration in time range)     Or   acetaminophen (TYLENOL) suppository 650 mg (has no administration in time range)   furosemide (LASIX) tablet 20 mg (has no administration in time range)   oxyCODONE (ROXICODONE) immediate release tablet 5 mg (has no administration in time range)   polycarbophil (FIBERCON) tablet 625 mg (has no administration in time range)   predniSONE (DELTASONE) tablet 10 mg ( Oral Automatically Held 10/2/24 0900)   sirolimus (RAPAMUNE) tablet 1 mg ( Oral Automatically Held 10/2/24 0900)

## 2024-09-29 NOTE — ED NOTES
Pt to er. Pt c/o SOB. States dx pneumonia on Friday at [facility] and put on ATB. States SOB is getting worse and coughing up mucous. Denies pain. Resp regular. Pt currently has lung cancer and states had 1st dose of immunotherapy 2 weeks or so ago. Family at side. Call light in reach.

## 2024-09-29 NOTE — H&P
History & Physical  Internal Medicine Resident         Patient: Ousmane Lockett 59 y.o. male      : 1965  Date of Admission: 2024  Date of Service: Pt seen/examined on 24 and Admitted to Inpatient with expected LOS greater than two midnights due to medical therapy.       ASSESSMENT AND PLAN    Recurrent Right Pleural Effusion: 2/2 malignancy. Cannot rule-out infectious etiology. COVID/FLU AB neg. Obtain lactic, procal, BloodCx, RespCx, PNA and viral panel. Broad spectrum abx w/ Vanc/Zosyn ( - ); de-escalate per cultures and clinical course. Continue diuresis. Pulm consulted; appreciate recommendations on pleurex vs dornase. If no further plans; consult IR for diagnostic/therapeutic thoracentesis. Pulm Hygiene; IS/Acapella.  Metastatic RLL NSCLC to Right Proximal Humerus: follows OP w/ [facility] Heme/Onc. Planning to start immunotherapy. S/p Right Shoulder Total Arthroplasty 2024. Home meds include prednisone 10mg daily and Sirolimus; hold both IP until infectious etiology ruled out. Pain management prn.  Elevated Troponin: 2/2 chronic lung damage from malignancy. Consider demand ischemia. Trend. No cardiac s/s. Continue to monitor.    Stenosis/Occlusion of left Subclavian/Brachiocephalic Vein: discussed w/ vascular OP f/u; suspect 2/2 catheters/ports in previous hx. Likely incidental finding w/ good collaterals. Monitor for neurologic symptoms.  Hx NHL , in remission.  Hx Renal Transplant : on sirolimus and prednisone 10mg daily; hold IP until infectious etiology rule out. Consider reaching out to [facility] transplant doctor if protracted period off medications for further recommendations.  Lactic Acidosis, resolved      Data reviewed (unless otherwise discussed in assessment/plan)  EKG:  I have reviewed the EKG with the following interpretation: NSR w/o acute ischemic changes.  Imaging:   CXR (2024) right-sided pleural effusion larger than prior study w/ consolidation/collapse  skill demonstration/audio/verbal instruction

## 2024-09-29 NOTE — PROGRESS NOTES
[facility]   Pharmacy Pharmacokinetic Monitoring Service - Vancomycin     Ousmane Lockett is a 59 y.o. male starting on vancomycin therapy for nosocomial pneumonia. Pharmacy consulted by Dr. Mejia for monitoring and adjustment.    Target Concentration: Goal AUC/HARRISON 400-600 mg*hr/L    Additional Antimicrobials: Zosyn    Pertinent Laboratory Values:   Wt Readings from Last 1 Encounters:   09/29/24 92.1 kg (203 lb)     Temp Readings from Last 1 Encounters:   09/29/24 99.5 °F (37.5 °C) (Oral)     Estimated Creatinine Clearance: 112 mL/min (based on SCr of 0.8 mg/dL).  Recent Labs     09/27/24  1223 09/29/24  1135   CREATININE 1.0 0.8   BUN 14 13   WBC 5.7 7.0     Procalcitonin: not available    Pertinent Cultures:  Culture Date Source Results   9/29 BCx2 pending   MRSA Nasal Swab: not ordered. Order placed by pharmacy.    Plan:  Dosing recommendations based on Bayesian software  Start vancomycin 2250 mg x1 (in ED) followed by 1500 mg Q12h  Anticipated AUC of 565 and trough concentration of 15.4 at steady state  Renal labs as indicated   Vancomycin concentration: not ordered   Pharmacy will continue to monitor patient and adjust therapy as indicated    Thank you for the consult,  Sebastian Escoto [facility]  9/29/2024 3:28 PM

## 2024-09-30 ENCOUNTER — APPOINTMENT (OUTPATIENT)
Dept: ULTRASOUND IMAGING | Age: 59
DRG: 180 | End: 2024-09-30
Payer: COMMERCIAL

## 2024-09-30 ENCOUNTER — APPOINTMENT (OUTPATIENT)
Age: 59
DRG: 180 | End: 2024-09-30
Attending: STUDENT IN AN ORGANIZED HEALTH CARE EDUCATION/TRAINING PROGRAM
Payer: COMMERCIAL

## 2024-09-30 ENCOUNTER — APPOINTMENT (OUTPATIENT)
Dept: GENERAL RADIOLOGY | Age: 59
DRG: 180 | End: 2024-09-30
Payer: COMMERCIAL

## 2024-09-30 PROBLEM — Z85.118 HISTORY OF LUNG CANCER: Status: ACTIVE | Noted: 2024-09-30

## 2024-09-30 PROBLEM — R93.89 ABNORMAL X-RAY: Status: ACTIVE | Noted: 2024-09-30

## 2024-09-30 LAB
ALBUMIN FLD-MCNC: 2.5 GM/DL
ALBUMIN SERPL BCG-MCNC: 3.2 G/DL (ref 3.5–5.1)
ANION GAP SERPL CALC-SCNC: 11 MEQ/L (ref 8–16)
BUN SERPL-MCNC: 12 MG/DL (ref 7–22)
CALCIUM SERPL-MCNC: 8 MG/DL (ref 8.5–10.5)
CHLORIDE SERPL-SCNC: 105 MEQ/L (ref 98–111)
CO2 SERPL-SCNC: 21 MEQ/L (ref 23–33)
CREAT SERPL-MCNC: 0.8 MG/DL (ref 0.4–1.2)
DEPRECATED RDW RBC AUTO: 45 FL (ref 35–45)
ECHO AO ASC DIAM: 2.7 CM
ECHO AV CUSP MM: 1.4 CM
ECHO LA AREA 2C: 13.9 CM2
ECHO LA AREA 4C: 14.1 CM2
ECHO LA DIAMETER: 3 CM
ECHO LA MAJOR AXIS: 4.2 CM
ECHO LA MINOR AXIS: 4 CM
ECHO LA VOL BP: 39 ML (ref 18–58)
ECHO LA VOL MOD A2C: 39 ML (ref 18–58)
ECHO LA VOL MOD A4C: 36 ML (ref 18–58)
ECHO LV EDV A2C: 87 ML
ECHO LV EDV A4C: 97 ML
ECHO LV EJECTION FRACTION A2C: 58 %
ECHO LV EJECTION FRACTION A4C: 56 %
ECHO LV EJECTION FRACTION BIPLANE: 57 % (ref 55–100)
ECHO LV ESV A2C: 37 ML
ECHO LV ESV A4C: 43 ML
ECHO LV FRACTIONAL SHORTENING: 33 % (ref 28–44)
ECHO LV INTERNAL DIMENSION DIASTOLIC: 4.8 CM (ref 4.2–5.9)
ECHO LV INTERNAL DIMENSION SYSTOLIC: 3.2 CM
ECHO LV IVSD: 0.8 CM (ref 0.6–1)
ECHO LV MASS 2D: 137.1 G (ref 88–224)
ECHO LV POSTERIOR WALL DIASTOLIC: 0.9 CM (ref 0.6–1)
ECHO LV RELATIVE WALL THICKNESS RATIO: 0.38
ECHO RV INTERNAL DIMENSION: 2.4 CM
ECHO RV TAPSE: 2.4 CM (ref 1.7–?)
ERYTHROCYTE [DISTWIDTH] IN BLOOD BY AUTOMATED COUNT: 15 % (ref 11.5–14.5)
GFR SERPL CREATININE-BSD FRML MDRD: > 90 ML/MIN/1.73M2
GLUCOSE FLD-MCNC: 107 MG/DL
GLUCOSE SERPL-MCNC: 95 MG/DL (ref 70–108)
HCT VFR BLD AUTO: 33.9 % (ref 42–52)
HGB BLD-MCNC: 10 GM/DL (ref 14–18)
LDH FLD L TO P-CCNC: 235 U/L
LDH SERPL L TO P-CCNC: 228 U/L (ref 100–190)
MAGNESIUM SERPL-MCNC: 1.9 MG/DL (ref 1.6–2.4)
MCH RBC QN AUTO: 24.2 PG (ref 26–33)
MCHC RBC AUTO-ENTMCNC: 29.5 GM/DL (ref 32.2–35.5)
MCV RBC AUTO: 82.1 FL (ref 80–94)
MRSA DNA SPEC QL NAA+PROBE: NEGATIVE
PH BIFL: 7.59 [PH]
PLATELET # BLD AUTO: 327 THOU/MM3 (ref 130–400)
PMV BLD AUTO: 9.2 FL (ref 9.4–12.4)
POTASSIUM SERPL-SCNC: 3.9 MEQ/L (ref 3.5–5.2)
PROT FLD-MCNC: 3.5 GM/DL
PROT SERPL-MCNC: 5.7 G/DL (ref 6.1–8)
RBC # BLD AUTO: 4.13 MILL/MM3 (ref 4.7–6.1)
SODIUM SERPL-SCNC: 137 MEQ/L (ref 135–145)
TRIGL FLD-MCNC: 19 MG/DL
WBC # BLD AUTO: 5.5 THOU/MM3 (ref 4.8–10.8)

## 2024-09-30 PROCEDURE — 83735 ASSAY OF MAGNESIUM: CPT

## 2024-09-30 PROCEDURE — 88112 CYTOPATH CELL ENHANCE TECH: CPT

## 2024-09-30 PROCEDURE — 32555 ASPIRATE PLEURA W/ IMAGING: CPT

## 2024-09-30 PROCEDURE — 84478 ASSAY OF TRIGLYCERIDES: CPT

## 2024-09-30 PROCEDURE — 87205 SMEAR GRAM STAIN: CPT

## 2024-09-30 PROCEDURE — 84155 ASSAY OF PROTEIN SERUM: CPT

## 2024-09-30 PROCEDURE — 93307 TTE W/O DOPPLER COMPLETE: CPT

## 2024-09-30 PROCEDURE — 83615 LACTATE (LD) (LDH) ENZYME: CPT

## 2024-09-30 PROCEDURE — 88305 TISSUE EXAM BY PATHOLOGIST: CPT

## 2024-09-30 PROCEDURE — 89051 BODY FLUID CELL COUNT: CPT

## 2024-09-30 PROCEDURE — 87075 CULTR BACTERIA EXCEPT BLOOD: CPT

## 2024-09-30 PROCEDURE — 2580000003 HC RX 258

## 2024-09-30 PROCEDURE — 99223 1ST HOSP IP/OBS HIGH 75: CPT | Performed by: INTERNAL MEDICINE

## 2024-09-30 PROCEDURE — 80048 BASIC METABOLIC PNL TOTAL CA: CPT

## 2024-09-30 PROCEDURE — 71045 X-RAY EXAM CHEST 1 VIEW: CPT

## 2024-09-30 PROCEDURE — 88184 FLOWCYTOMETRY/ TC 1 MARKER: CPT

## 2024-09-30 PROCEDURE — 82040 ASSAY OF SERUM ALBUMIN: CPT

## 2024-09-30 PROCEDURE — 88185 FLOWCYTOMETRY/TC ADD-ON: CPT

## 2024-09-30 PROCEDURE — 84157 ASSAY OF PROTEIN OTHER: CPT

## 2024-09-30 PROCEDURE — 99232 SBSQ HOSP IP/OBS MODERATE 35: CPT | Performed by: PHYSICIAN ASSISTANT

## 2024-09-30 PROCEDURE — 87070 CULTURE OTHR SPECIMN AEROBIC: CPT

## 2024-09-30 PROCEDURE — 0W993ZZ DRAINAGE OF RIGHT PLEURAL CAVITY, PERCUTANEOUS APPROACH: ICD-10-PCS

## 2024-09-30 PROCEDURE — 6370000000 HC RX 637 (ALT 250 FOR IP): Performed by: STUDENT IN AN ORGANIZED HEALTH CARE EDUCATION/TRAINING PROGRAM

## 2024-09-30 PROCEDURE — 36415 COLL VENOUS BLD VENIPUNCTURE: CPT

## 2024-09-30 PROCEDURE — 1200000003 HC TELEMETRY R&B

## 2024-09-30 PROCEDURE — 6370000000 HC RX 637 (ALT 250 FOR IP)

## 2024-09-30 PROCEDURE — 82042 OTHER SOURCE ALBUMIN QUAN EA: CPT

## 2024-09-30 PROCEDURE — 93307 TTE W/O DOPPLER COMPLETE: CPT | Performed by: NUCLEAR MEDICINE

## 2024-09-30 PROCEDURE — 6360000002 HC RX W HCPCS: Performed by: STUDENT IN AN ORGANIZED HEALTH CARE EDUCATION/TRAINING PROGRAM

## 2024-09-30 PROCEDURE — 82945 GLUCOSE OTHER FLUID: CPT

## 2024-09-30 PROCEDURE — 94669 MECHANICAL CHEST WALL OSCILL: CPT

## 2024-09-30 PROCEDURE — 83986 ASSAY PH BODY FLUID NOS: CPT

## 2024-09-30 PROCEDURE — 6360000002 HC RX W HCPCS

## 2024-09-30 PROCEDURE — 85027 COMPLETE CBC AUTOMATED: CPT

## 2024-09-30 RX ADMIN — PIPERACILLIN AND TAZOBACTAM 3375 MG: 3; .375 INJECTION, POWDER, FOR SOLUTION INTRAVENOUS at 22:38

## 2024-09-30 RX ADMIN — PIPERACILLIN AND TAZOBACTAM 3375 MG: 3; .375 INJECTION, POWDER, FOR SOLUTION INTRAVENOUS at 04:53

## 2024-09-30 RX ADMIN — VANCOMYCIN HYDROCHLORIDE 1500 MG: 5 INJECTION, POWDER, LYOPHILIZED, FOR SOLUTION INTRAVENOUS at 12:44

## 2024-09-30 RX ADMIN — ACETAMINOPHEN 650 MG: 325 TABLET ORAL at 09:12

## 2024-09-30 RX ADMIN — ACETAMINOPHEN 650 MG: 325 TABLET ORAL at 18:02

## 2024-09-30 RX ADMIN — ENOXAPARIN SODIUM 40 MG: 100 INJECTION SUBCUTANEOUS at 09:05

## 2024-09-30 RX ADMIN — VANCOMYCIN HYDROCHLORIDE 1500 MG: 5 INJECTION, POWDER, LYOPHILIZED, FOR SOLUTION INTRAVENOUS at 00:30

## 2024-09-30 RX ADMIN — CALCIUM POLYCARBOPHIL 625 MG: 625 TABLET, FILM COATED ORAL at 09:05

## 2024-09-30 RX ADMIN — PREDNISONE 10 MG: 10 TABLET ORAL at 09:05

## 2024-09-30 RX ADMIN — ACETAMINOPHEN 650 MG: 325 TABLET ORAL at 00:38

## 2024-09-30 RX ADMIN — PIPERACILLIN AND TAZOBACTAM 3375 MG: 3; .375 INJECTION, POWDER, FOR SOLUTION INTRAVENOUS at 15:42

## 2024-09-30 RX ADMIN — SODIUM CHLORIDE: 9 INJECTION, SOLUTION INTRAVENOUS at 04:52

## 2024-09-30 ASSESSMENT — PAIN DESCRIPTION - DESCRIPTORS: DESCRIPTORS: ACHING

## 2024-09-30 NOTE — PLAN OF CARE
Problem: Pain  Goal: Verbalizes/displays adequate comfort level or baseline comfort level  Outcome: Progressing  Flowsheets (Taken 9/30/2024 0030)  Verbalizes/displays adequate comfort level or baseline comfort level:   Encourage patient to monitor pain and request assistance   Assess pain using appropriate pain scale   Administer analgesics based on type and severity of pain and evaluate response   Implement non-pharmacological measures as appropriate and evaluate response     Problem: Safety - Adult  Goal: Free from fall injury  Outcome: Progressing   Fall assessment completed. Patient using call light appropriately to call for assistance with ambulation to bathroom.  Personal items within reach. Patient is also compliant with use of non-skid slippers.         Problem: Skin/Tissue Integrity - Adult  Goal: Skin integrity remains intact  Outcome: Progressing  Flowsheets (Taken 9/29/2024 2021)  Skin Integrity Remains Intact:   Monitor for areas of redness and/or skin breakdown   Assess vascular access sites hourly     Problem: Musculoskeletal - Adult  Goal: Maintain proper alignment of affected body part  Outcome: Progressing  Flowsheets (Taken 9/29/2024 2021)  Maintain proper alignment of affected body part:   Support and protect limb and body alignment per provider's orders   Instruct and reinforce with patient and family use of appropriate assistive device and precautions (e.g. spinal or hip dislocation precautions)     Problem: Respiratory - Adult  Goal: Achieves optimal ventilation and oxygenation  Outcome: Progressing  Flowsheets (Taken 9/29/2024 2021)  Achieves optimal ventilation and oxygenation:   Assess for changes in respiratory status   Assess for changes in mentation and behavior   Position to facilitate oxygenation and minimize respiratory effort   Oxygen supplementation based on oxygen saturation or arterial blood gases   Initiate smoking cessation protocol as indicated   Encourage broncho-pulmonary

## 2024-09-30 NOTE — CONSULTS
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  History of recent travel to long distances: NO  History of exposure to birds, pigeons, or chickens in the past: NO  Pet animals at home:Yes  Dogs: 1  Cats: 0    History of pulmonary embolism in the past: No            History of DVT in the past: Yes, per chart review: left upper extremity and left lower extremity DVT, 1995    [facility]   General/Constitutional: Fatigue, cough, SOBOE  HENT: Negative  Eyes: Negative  Upper respiratory tract: cough  Lower respiratory tract/ lungs: SOBOE   Cardiovascular: Negative  Gastrointestinal: Negative  Neurological: Negative  Extremities: Negative  Musculoskeletal: Negative  Genitourinary: Negative  Hematological: Negative  Psychiatric/Behavioral: Negative  Skin: Negative    Vitals     weight is 92.1 kg (203 lb). His axillary temperature is 99.3 °F (37.4 °C). His blood pressure is 133/80 and his pulse is 91. His respiration is 18 and oxygen saturation is 100%.   Body mass index is 29.98 kg/m².    SUPPLEMENTAL O2:       I/O      Intake/Output Summary (Last 24 hours) at 9/30/2024 1853  Last data filed at 9/30/2024 0453  Gross per 24 hour   Intake 1296.31 ml   Output 560 ml   Net 736.31 ml     I/O last 3 completed shifts:  In: 1296.3 [P.O.:250; I.V.:159.6; IV Piggyback:886.8]  Out: 560 [Urine:560]   Patient Vitals for the past 96 hrs (Last 3 readings):   Weight   09/29/24 1130 92.1 kg (203 lb)       Exam   Nursing note and vitals reviewed.  Constitutional:   HENT: Normocephalic, atraumatic  Head: Normocephalic, atraumatic  Right Ear: External ear intact, no drainage:   Left Ear: External ear intact, no drainage  Mouth/Throat: Mallampati 3. Pink, moist oral mucosa. No  2024  Side: right side of chest .  By IR:Yes  Amount of pleural fluid drained: 1 L    Lab Results   Component Value Date/Time    COLORU YELLOW 09/29/2024 01:00 PM     No results found for: \"LD\"  CYTOLOGY: Pending    Serum LDH: 228  LDH ratio i.e Pleural fluid LDH/ Serum LDH:   Total protein ratio i.e Pleural fluid Total protein/ Serum Total protein:  /5.7    Chest x-ray after thoracentesis:  IMPRESSION:  1. No pneumothorax.  2. Improved aeration of the right lung base.  3. Persistent interstitial opacities in the left lung base      Plan:  Continue incentive spirometer every 4 hourly as tolerated.  Follow-up pending pleural fluid cytology and labs.  Patient to follow-up with his cardiothoracic surgeon Dr. Wyatt Marshall MD in 1 week after discharge from the hospital for further management of recurrent right-sided exudative pleural effusion.    Electronically signed by   Saurav Tellez MD on 9/30/2024 at 6:53 PM

## 2024-09-30 NOTE — PLAN OF CARE
Problem: Pain  Goal: Verbalizes/displays adequate comfort level or baseline comfort level  9/30/2024 1129 by Adonay Crocker RN  Outcome: Progressing     Problem: Safety - Adult  Goal: Free from fall injury  9/30/2024 1129 by Adonay Crocker RN  Outcome: Progressing     Problem: Respiratory - Adult  Goal: Achieves optimal ventilation and oxygenation  9/30/2024 1129 by Adonay Crocker RN  Outcome: Progressing     Problem: Skin/Tissue Integrity - Adult  Goal: Skin integrity remains intact  9/30/2024 1129 by Adonay Crocker RN  Outcome: Progressing     Problem: Musculoskeletal - Adult  Goal: Maintain proper alignment of affected body part  9/30/2024 1129 by Adonay Crocker RN  Outcome: Progressing     Problem: Chronic Conditions and Co-morbidities  Goal: Patient's chronic conditions and co-morbidity symptoms are monitored and maintained or improved  Outcome: Progressing     Problem: Discharge Planning  Goal: Discharge to home or other facility with appropriate resources  Outcome: Progressing       Care plan reviewed with patient , patient verbalized understanding of plan of care and contributed to goal setting.

## 2024-09-30 NOTE — CARE COORDINATION
Case Management Assessment Initial Evaluation    Date/Time of Evaluation: 2024 2:09 PM  Assessment Completed by: Taylor Lundberg, RN    If patient is discharged prior to next notation, then this note serves as note for discharge by case management.    Patient Name: Ousmane Lockett                   YOB: 1965  Diagnosis: Recurrent right pleural effusion [J90]                   Date / Time: 2024 11:25 AM  Location: [address] [place] [address]     Patient Admission Status: Inpatient   Readmission Risk Low 0-14, Mod 15-19), High > 20: Readmission Risk Score: 14.2    Current PCP: Daniel Barbosa MD  Health Care Decision Makers:   Primary Decision Maker: Monica Lockett - [facility] - [phone]    Additional Case Management Notes: Presented to ED with SOB, cough and fever. Was dx with PNA 2 days prior and started on Levaquin. Hx non-Hodgkin's lymphoma greater 20 years ago, and current non-small cell lung carcinoma. Underwent right lobectomy in 2024 at [facility]. Since has had persistent pleural effusions with taps, and had a chest tube during recent admission to [facility]. Admitted by hospitalist with consult to pulmonology. Blood cultures sent (negative at 24 hrs), IV Zosyn and IV Vanc. Monitoring temps, tx with tylenol PRN.     Procedures:    Thoracentesis done, 1L aspirated.     Imagin/29 CXR:   The right-sided pleural effusion is increased in size. This is now a moderate size. There is consolidation/collapse in the right lung base. Mild patchy infiltrates in the left lung base.   CTA Chest:    Moderate size layering right pleural effusion. Interstitial infiltrates in the lungs. Can indicate a viral or atypical PNA. Lymphangitic spread of tumor is also a consideration. Stenosis vs occlusion of the left subclavian or brachiocephalic vein.    CXR:  No pneumothorax. Improved aeration of the right lung base. Persistent interstitial opacities in the left lung base       Patient Goals/Plan/Treatment

## 2024-09-30 NOTE — PROGRESS NOTES
09/30/24 0848   Encounter Summary   Encounter Overview/Reason Spiritual/Emotional Needs   Service Provided For Patient   Referral/Consult From [facility]   Support System Spouse   Last Encounter  09/30/24   Complexity of Encounter Low   Begin Time 0843   End Time  0848   Total Time Calculated 5 min   Assessment/Intervention/Outcome   Assessment Unable to assess   Intervention Prayer (assurance of)/[place]     During my encounter with the 59 yr old patient, I attempted to visit with the pt on 59. The patient appears to be resting (not responsive/resting) now and I didn't want to disturb the patient. I or another  will attempt to visit the patient or the family at another time. The pt was admitted due to recurrent right pleural effusion.     Note: I contacted Katie in Spiritual Care so that the pt could be seen by a .

## 2024-09-30 NOTE — PROGRESS NOTES
Hospitalist Progress Note    Patient:  Ousmane Lockett      Unit/Bed:5K-21/021-A    YOB: 1965    MRN: [id number]       Acct: [id number]     PCP: Daniel Barbosa MD    Date of Admission: 9/29/2024    Assessment/Plan:    Recurrent Right Pleural Effusion 2/2 Metastatic RLL NSCLC with mets to Right Proximal Humerus:  -Pulmonology consulted  -Patient taken for thoracentesis today  -Diet order placed following the procedure  -Pleural fluid studies pending  -Pulmonary hygiene   -Patient with scheduled outpatient appointment with [facility] heme-onc tomorrow, planning to start immunotherapy, status post Right Shoulder Total Arthroplasty 8/06/2024.  -Continue home meds include prednisone 10mg daily and Sirolimus  -Pain management prn  -Continue Zosyn for now    Elevated Troponin: 2/2 chronic lung damage from malignancy. Possible demand ischemia.   -Flat Trend     Stenosis/Occlusion of left Subclavian/Brachiocephalic Vein: discussed w/ vascular OP f/u; suspect 2/2 catheters/ports in previous hx.   Likely incidental finding w/ good collaterals. Monitor for neurologic symptoms.    Hx NHL 1990s, in remission    Hx Renal Transplant 1999: on sirolimus and prednisone 10mg daily; hold IP until infectious etiology ruled out. Consider reaching out to [facility] transplant doctor if prolonged period off medications for further recommendations.    Lactic Acidosis, resolved      LDA: []CVC / []PICC / []Midline / []Sibley / []Drains / []Mediport / [x]None  Antibiotics: Zosyn  Steroids: No  Labs (still needed?): [x]Yes / []No  IVF (still needed?): []Yes / [x]No    Level of care: []Step Down / [x]Med-Surg  Bed Status: [x]Inpatient / []Observation  Telemetry: []Yes / [x]No  PT/OT: []Yes / [x]No    DVT Prophylaxis: [x] Lovenox / [] Heparin / [] SCDs / [] Already on Systemic Anticoagulation / [] None     Disposition:    [x] Home       [] TCU       [] Rehab       [] Psych       [] SNF       [] Long Term Care Facility       []

## 2024-10-01 ENCOUNTER — TELEPHONE (OUTPATIENT)
Dept: PULMONOLOGY | Age: 59
End: 2024-10-01

## 2024-10-01 VITALS
HEART RATE: 94 BPM | BODY MASS INDEX: 29.98 KG/M2 | TEMPERATURE: 98.8 F | SYSTOLIC BLOOD PRESSURE: 117 MMHG | RESPIRATION RATE: 16 BRPM | OXYGEN SATURATION: 95 % | DIASTOLIC BLOOD PRESSURE: 63 MMHG | WEIGHT: 203 LBS

## 2024-10-01 LAB
CHARACTER, BODY FLUID: ABNORMAL
COLOR FLD: ABNORMAL
GRANULOCYTES NFR FLD AUTO: 0 %
LYMPHOCYTES NFR FLD MANUAL: 97 %
MONOCYTES NFR FLD MANUAL: 3 %
MONONUC CELLS NFR FLD AUTO: 0 %
NUC CELL # FLD AUTO: 2882 /CUMM (ref 0–500)
PATHOLOGIST REVIEW: ABNORMAL
RBC # FLD AUTO: 2000 /CUMM
SPECIMEN: ABNORMAL
TOTAL VOLUME RECEIVED BODY FLUID: 73 ML

## 2024-10-01 PROCEDURE — 99232 SBSQ HOSP IP/OBS MODERATE 35: CPT | Performed by: INTERNAL MEDICINE

## 2024-10-01 PROCEDURE — 99238 HOSP IP/OBS DSCHRG MGMT 30/<: CPT | Performed by: PHYSICIAN ASSISTANT

## 2024-10-01 PROCEDURE — 6360000002 HC RX W HCPCS

## 2024-10-01 PROCEDURE — 2580000003 HC RX 258

## 2024-10-01 PROCEDURE — 6370000000 HC RX 637 (ALT 250 FOR IP): Performed by: STUDENT IN AN ORGANIZED HEALTH CARE EDUCATION/TRAINING PROGRAM

## 2024-10-01 PROCEDURE — 6370000000 HC RX 637 (ALT 250 FOR IP)

## 2024-10-01 RX ADMIN — PREDNISONE 10 MG: 10 TABLET ORAL at 07:55

## 2024-10-01 RX ADMIN — CALCIUM POLYCARBOPHIL 625 MG: 625 TABLET, FILM COATED ORAL at 07:55

## 2024-10-01 RX ADMIN — PIPERACILLIN AND TAZOBACTAM 3375 MG: 3; .375 INJECTION, POWDER, FOR SOLUTION INTRAVENOUS at 06:44

## 2024-10-01 NOTE — TELEPHONE ENCOUNTER
----- Message from JENSEN De La Torre - CNP sent at 10/1/2024  2:49 PM EDT -----    CXR and PFT in 2 months patient was discharged resident put the orders in     Thank you,   Bobo Jeronimo

## 2024-10-01 NOTE — DISCHARGE INSTRUCTIONS
Please reach out to [facility] regarding resumption of your Sirolimus and Prednisone. You may certainly resume these once antibiotics are completed, though recommend you defer these medications until that time unless instructed to take them by [facility].    Follow up with pulmonology office in 1-2 months; CXR and PFTs ordered for one month from now

## 2024-10-01 NOTE — CARE COORDINATION
10/1/24, 1:14 PM EDT    Patient goals/plan/ treatment preferences discussed by  and .  Patient goals/plan/ treatment preferences reviewed with patient/ family.  Patient/ family verbalize understanding of discharge plan and are in agreement with goal/plan/treatment preferences.  Understanding was demonstrated using the teach back method.  AVS provided by RN at time of discharge, which includes all necessary medical information pertaining to the patients current course of illness, treatment, post-discharge goals of care, and treatment preferences.     Services At/After Discharge: None

## 2024-10-01 NOTE — PLAN OF CARE
Problem: Pain  Goal: Verbalizes/displays adequate comfort level or baseline comfort level  10/1/2024 0811 by Adonay Crocker RN  Outcome: Progressing     Problem: Safety - Adult  Goal: Free from fall injury  10/1/2024 0811 by Adonay Crocker RN  Outcome: Progressing     Problem: Respiratory - Adult  Goal: Achieves optimal ventilation and oxygenation  10/1/2024 0811 by Adonay Crocker RN  Outcome: Progressing     Problem: Skin/Tissue Integrity - Adult  Goal: Skin integrity remains intact  10/1/2024 0811 by Adonay Crocker RN  Outcome: Progressing     Problem: Musculoskeletal - Adult  Goal: Maintain proper alignment of affected body part  10/1/2024 0811 by Adonay Crocker RN  Outcome: Progressing     Problem: Chronic Conditions and Co-morbidities  Goal: Patient's chronic conditions and co-morbidity symptoms are monitored and maintained or improved  10/1/2024 0811 by Adonay Crocker RN  Outcome: Progressing     Problem: Discharge Planning  Goal: Discharge to home or other facility with appropriate resources  10/1/2024 0811 by Adonay Crocker RN  Outcome: Progressing    Care plan reviewed with patient , patient verbalized understanding of plan of care and contributed to goal setting.

## 2024-10-01 NOTE — PROGRESS NOTES
diagnosed    Diabetes Brother         5 years ago diagnosed     Sleep History    Never diagnosed with sleep apnea in the past.  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 His oral temperature is 98.8 °F (37.1 °C). His blood pressure is 117/63 and his pulse is 94. His respiration is 16 and oxygen saturation is 95%.   Body mass index is 29.98 kg/m².    SUPPLEMENTAL O2:       I/O      Intake/Output Summary (Last 24 hours) at 10/1/2024 0959  Last data filed at 10/1/2024 0400  Gross per 24 hour   Intake 1161.62 ml   Output --   Net 1161.62 ml     I/O last 3 completed shifts:  In: 2457.9 [P.O.:850; I.V.:327.5; IV Piggyback:1280.5]  Out: 560 [Urine:560]   Patient Vitals for the past 96 hrs (Last 3 readings):   Weight   09/29/24 1130 92.1 kg (203 lb)       Exam   Nursing note and vitals reviewed.  Constitutional:   HENT: Normocephalic, atraumatic  Head: Normocephalic, atraumatic  Right Ear: External ear intact, no drainage:   Left Ear: External ear intact, no drainage  Mouth/Throat: Mallampati 3. Pink, moist oral mucosa. No oral thrust  Eyes: PERRLA  Neck: no JVD. Supple, midline  (-). ECHO showed EF 55-60%. BC NGTD. Preliminary resp culture showing rare segmented neutrophils. Patient more than 48 hours out from last fever.  Hx of  non-Hodgkin's lymphoma diagnosed in 1995.  Patient underwent chemoradiation  S/p kidney transplant on immunosuppressive therapy.    CODE STATUS: FULL  Plan   Not in acute distress, on RA; cleared to discharge from pulmonary standpoint  Continue Prednisone 10mg PO QD  Begin course of Augmentin for 7 days after discharge  Pending pleural fluid cultures  No breathing treatments indicated currently.  Patient to follow-up with his cardiothoracic surgeon, Dr. Wyatt Marshall MD in 1 week for further management of recurrent right-sided exudative pleural effusion.  CXR and PFT ordered to be done in 1 month; follow up with pulmonology office outpatient within next two months  Patient may need pleruex catheter pending pleural fluid results  Questions and concerns addressed with Mr. Ousmane Guzman.    Electronically signed by   Jack Eckert DO on 10/1/2024 at 9:59 AM     Addendum by Dr. Rosalinda MD:  Patient seen by me independently including key components of medical care. Face to face evaluation and examination was performed. Case discussed with Dr. Jack Eckert DO  -resident physician. Agree with resident's findings and plan as documented in the resident's note. Italicized font, if present,  represents changes to the note made by me.   More than 50% of the encounter time involved with patient care by the Pulmonary & Critical care service team spent by me.    Please see my modifications mentioned below:  Feels much better  On room air  Thoracentesis fluid analysis results.   Performed on: 30 September 2024  Side: right side of chest .  By IR:Yes  Amount of pleural fluid drained: 1 L           Lab Results   Component Value Date/Time     COLORU YELLOW 09/29/2024 01:00 PM      No results found for: \"LD\"  CYTOLOGY: Pending     Serum LDH: 228  LDH ratio

## 2024-10-01 NOTE — DISCHARGE SUMMARY
Hospital Medicine Discharge Summary      Patient Identification:   Ousmane Lockett   : 1965  MRN: [id number]   Account: [id number]      Patient's PCP: Daniel Barbosa MD    Admit Date: 2024     Discharge Date: 10/1/24    Admitting Physician: No admitting provider for patient encounter.     Discharge Physician: Ashu Arias PA-C     Ousmane Lockett is a 59 y.o. male admitted to [facility] on 2024.      HPI On Admission From H&P:    ***    Assessment/Plan With Discharge Diagnoses:    ***    Exam:     Vitals:  Vitals:    24 1821 09/30/24 2025 10/01/24 0300 10/01/24 0745   BP: 133/80 128/66 (!) 147/56 117/63   Pulse: 91 90 88 94   Resp: 18 16 16 16   Temp: 99.3 °F (37.4 °C) 99.3 °F (37.4 °C) 99 °F (37.2 °C) 98.8 °F (37.1 °C)   TempSrc: Axillary Oral Oral Oral   SpO2: 100% 95% 96% 95%   Weight:         Weight: Weight - Scale: 92.1 kg (203 lb)     24 hour intake/output:  Intake/Output Summary (Last 24 hours) at 10/1/2024 1138  Last data filed at 10/1/2024 0830  Gross per 24 hour   Intake 1411.62 ml   Output --   Net 1411.62 ml         Labs: For convenience and continuity at follow-up the following most recent labs are provided:    CBC:    Lab Results   Component Value Date/Time    WBC 5.5 2024 03:29 AM    HGB 10.0 2024 03:29 AM    HCT 33.9 2024 03:29 AM     2024 03:29 AM       Renal:    Lab Results   Component Value Date/Time     2024 03:29 AM    K 3.9 2024 03:29 AM    K 5.1 10/04/2021 07:46 PM     2024 03:29 AM    CO2 21 2024 03:29 AM    BUN 12 2024 03:29 AM    CREATININE 0.8 2024 03:29 AM    CALCIUM 8.0 2024 03:29 AM    PHOS 2.6 2023 07:59 AM         Significant Diagnostic Studies    Radiology:   US THORACENTESIS Which side should the procedure be performed? Right   Final Result      Successful ultrasound-guided thoracentesis with 1 L of fluid drained.

## 2024-10-01 NOTE — PLAN OF CARE
Problem: Pain  Goal: Verbalizes/displays adequate comfort level or baseline comfort level  9/30/2024 2343 by Mary James RN  Outcome: Progressing  Flowsheets (Taken 9/30/2024 2025)  Verbalizes/displays adequate comfort level or baseline comfort level:   Encourage patient to monitor pain and request assistance   Assess pain using appropriate pain scale   Administer analgesics based on type and severity of pain and evaluate response   Implement non-pharmacological measures as appropriate and evaluate response     Problem: Safety - Adult  Goal: Free from fall injury  9/30/2024 2343 by Mary James RN  Outcome: Progressing   Fall assessment completed. Patient using call light appropriately to call for assistance with ambulation to bathroom.  Personal items within reach. Patient is also compliant with use of non-skid slippers.      Problem: Skin/Tissue Integrity - Adult  Goal: Skin integrity remains intact  9/30/2024 2343 by Mary James RN  Outcome: Progressing     Problem: Musculoskeletal - Adult  Goal: Maintain proper alignment of affected body part  9/30/2024 2343 by Mary James RN  Outcome: Progressing  Flowsheets (Taken 9/30/2024 2025)  Maintain proper alignment of affected body part:   Support and protect limb and body alignment per provider's orders   Instruct and reinforce with patient and family use of appropriate assistive device and precautions (e.g. spinal or hip dislocation precautions)     Problem: Chronic Conditions and Co-morbidities  Goal: Patient's chronic conditions and co-morbidity symptoms are monitored and maintained or improved  9/30/2024 2343 by Mary James RN  Outcome: Progressing  Flowsheets (Taken 9/30/2024 2025)  Care Plan - Patient's Chronic Conditions and Co-Morbidity Symptoms are Monitored and Maintained or Improved:   Monitor and assess patient's chronic conditions and comorbid symptoms for stability, deterioration, or improvement   Collaborate with

## 2024-10-02 ENCOUNTER — APPOINTMENT (OUTPATIENT)
Dept: OCCUPATIONAL THERAPY | Age: 59
End: 2024-10-02
Payer: COMMERCIAL

## 2024-10-02 LAB
BACTERIA BLD AEROBE CULT: NORMAL
BACTERIA BLD AEROBE CULT: NORMAL

## 2024-10-04 ENCOUNTER — HOSPITAL ENCOUNTER (OUTPATIENT)
Dept: OCCUPATIONAL THERAPY | Age: 59
Setting detail: THERAPIES SERIES
Discharge: HOME | End: 2024-10-04
Payer: COMMERCIAL

## 2024-10-04 LAB
BACTERIA BLD AEROBE CULT: NORMAL
BACTERIA BLD AEROBE CULT: NORMAL
LEUKEMIA/LYMPHOMA PHENOTYPING MISC: NORMAL

## 2024-10-04 PROCEDURE — 97110 THERAPEUTIC EXERCISES: CPT

## 2024-10-04 NOTE — PROGRESS NOTES
on higher than 3/10 at any time to increase his ability to eventually use his right arm to feed self.  Report having no more than occasional difficulty with dressing tasks.    Long Term Goals:  Time Frame: 20 weeks  Be able to use right arm to assist in turning steering wheel.  Be able to use right arm for normal work tasks without difficulty.  Be able to use right arm to assist with LE dressing and bathing.   Be able to sleep in bed all night without waking due to right shoulder pain.    Patient Education:   []  HEP/Education Completed: Plan of Care, Goals, HEP - to resume previous HEP given by [facility] (Pre 6 week protocol); instructed on supine ER with dowel, bilateral table slides for shoulder flexion, scapular retraction  9/27/24: pendulums, submaximal isometrics for right shoulder flexion with elbow extension and with elbow flexion, shoulder extension; use of ice for pain management  [x]  No new Education completed  []  Reviewed Prior HEP      [x]  Patient verbalized and/or demonstrated understanding of education provided.  []  Patient unable to verbalize and/or demonstrate understanding of education provided.  Will continue education.  [x]  Barriers to learning: none    PLAN:      []  Plan of care initiated.  Plan to see patient 2 times per week for 20 weeks to address the treatment planned outlined above.  [x]  Continue with current plan of care  []  Modify plan of care as follows:    []  Hold pending physician visit  []  Discharge    Time In 1520   Time Out 1600   Timed Code Minutes: 40 min   Total Treatment Time: 40 min       Dafne Mondragon, OTR/L #[id number]

## 2024-10-05 ENCOUNTER — LAB (OUTPATIENT)
Dept: LAB | Age: 59
End: 2024-10-05

## 2024-10-05 LAB
BACTERIA SPEC ANAEROBE CULT: NORMAL
BACTERIA SPEC BFLD CULT: NORMAL
GRAM STN SPEC: NORMAL

## 2024-10-06 PROBLEM — R79.89 ELEVATED TROPONIN: Status: ACTIVE | Noted: 2024-10-06

## 2024-10-06 PROBLEM — I87.1: Status: ACTIVE | Noted: 2024-10-06

## 2024-10-06 PROBLEM — C34.90 METASTATIC NON-SMALL CELL LUNG CANCER (HCC): Status: ACTIVE | Noted: 2024-10-06

## 2024-10-07 ENCOUNTER — TELEPHONE (OUTPATIENT)
Dept: PULMONOLOGY | Age: 59
End: 2024-10-07

## 2024-10-07 ENCOUNTER — HOSPITAL ENCOUNTER (OUTPATIENT)
Dept: OCCUPATIONAL THERAPY | Age: 59
Setting detail: THERAPIES SERIES
End: 2024-10-07
Payer: COMMERCIAL

## 2024-10-07 DIAGNOSIS — J18.9 PNEUMONIA DUE TO INFECTIOUS ORGANISM, UNSPECIFIED LATERALITY, UNSPECIFIED PART OF LUNG: Primary | ICD-10-CM

## 2024-10-07 NOTE — TELEPHONE ENCOUNTER
Orders pended by Pulmonary resident not signed prior to discharge orders placed to facilitate patient care as resident is on vacation. CXR PA/L and Full PFT in 3 month.

## 2024-10-08 NOTE — PROGRESS NOTES
Physician Progress Note      PATIENT:               MICHEAL AGUILAR  CSN #:                  [id number]  :                       1965  ADMIT DATE:       2024 11:25 AM  DISCH DATE:        10/1/2024 2:04 PM  RESPONDING  PROVIDER #:        Ashu Arias PA-C          QUERY TEXT:    Patient admitted with pleural effusion, noted to have Metastatic RLL NSCL. If   possible, please document in progress notes and d/c summary further   specificity regarding the type/underlying cause of pleural effusion:    The medical record reflects the following:  Risk Factors: 59 yr old, Metastatic RLL NSCL  Clinical Indicators: Pulmonary documented-Right pleural effusion, recurrent:   noted repeat accumulations in 2024, 2024. Previously drianed 1250ml with   rt. IR-guided thora on  1 L of fluids drained. Negative for malignancy.    Documented in H & P-Recurrent Right Pleural Effusion: 2/2 malignancy.  Treatment: Lab monitoring, Imaging, thoracentesis    Thank You!  Tricia Suggs RN, CRCR  RN Clinical Documentation Integrity  Options provided:  -- Malignant pleural effusion due to Metastatic RLL NSCLC  -- Pleural effusion due to other, Please specify  -- Other - I will add my own diagnosis  -- Disagree - Not applicable / Not valid  -- Disagree - Clinically unable to determine / Unknown  -- Refer to Clinical Documentation Reviewer    PROVIDER RESPONSE TEXT:    Patient has a Malignant pleural effusion due to Metastatic RLL NSCLC.    Query created by: Tricia Suggs on 10/8/2024 9:21 AM      Electronically signed by:  Ashu Arias PA-C 10/8/2024 6:28 PM

## 2024-10-09 ENCOUNTER — APPOINTMENT (OUTPATIENT)
Dept: OCCUPATIONAL THERAPY | Age: 59
End: 2024-10-09
Payer: COMMERCIAL

## 2024-10-09 LAB — SIROLIMUS BLD-MCNC: 3.8 NG/ML

## 2024-10-16 ENCOUNTER — HOSPITAL ENCOUNTER (OUTPATIENT)
Dept: OCCUPATIONAL THERAPY | Age: 59
Setting detail: THERAPIES SERIES
Discharge: HOME | End: 2024-10-16
Payer: COMMERCIAL

## 2024-10-16 PROCEDURE — 97110 THERAPEUTIC EXERCISES: CPT

## 2024-10-16 NOTE — PROGRESS NOTES
[facility]  OCCUPATIONAL THERAPY  [] EVALUATION  [x] DAILY NOTE (LAND) [] DAILY NOTE (AQUATIC ) [] PROGRESS NOTE [] DISCHARGE NOTE    [] OUTPATIENT REHABILITATION [facility]   [] [place] [facility]    [x] [facility]   [] [facility]    Date: 10/16/2024  Patient Name:  Ousmane Lockett  : 1965  MRN: [id number]  CSN: [id number]    Referring Practitioner Quinn Lovelace MD [id number]      Diagnosis Presence of right artificial shoulder joint   Treatment Diagnosis M25.511  Right Shoulder Pain  M79.601  Right Arm Pain  M25.611 Stiffness of Right Shoulder   Date of Evaluation 24   Additional Pertinent History Ousmane Lockett has a past medical history of CVA (cerebral vascular accident) (HCC), Hematoma, Hx of blood clots, Hyperlipidemia, Hypertension, Non Hodgkin's lymphoma (HCC), NSCLC of right lung (HCC), and Renal transplant recipient.  he has a past surgical history that includes Appendectomy; Kidney transplant (2000); bone marrow transplant (); Facial Surgery (N/A, 10/28/2020); Colonoscopy; other surgical history; other surgical history (2000); Cholecystectomy, laparoscopic (N/A, 2021); shoulder surgery (Right, 2024); and Lung lobectomy (Right, 2024).     Allergies No Known Allergies   Medications   Current Outpatient Medications:     sirolimus (RAPAMUNE) 1 MG tablet, Take 1 tablet by mouth daily, Disp: , Rfl:     oxyCODONE (ROXICODONE) 5 MG immediate release tablet, Take 1 tablet by mouth every 6 hours as needed., Disp: , Rfl:     predniSONE (DELTASONE) 10 MG tablet, Take 1 tablet by mouth daily, Disp: , Rfl:     polycarbophil (FIBERCON) 625 MG tablet, Take 1 tablet by mouth daily, Disp: , Rfl:     Cholecalciferol (VITAMIN D3) 1.25 MG (01999 UT) CAPS, Take by mouth, Disp: , Rfl:     furosemide (LASIX) 20 MG tablet, Take 1 tablet by mouth daily (Patient taking differently: Take 1 tablet by mouth 2 times

## 2024-10-18 ENCOUNTER — HOSPITAL ENCOUNTER (OUTPATIENT)
Dept: OCCUPATIONAL THERAPY | Age: 59
Setting detail: THERAPIES SERIES
Discharge: HOME | End: 2024-10-18
Payer: COMMERCIAL

## 2024-10-18 PROCEDURE — 97110 THERAPEUTIC EXERCISES: CPT

## 2024-10-18 NOTE — PROGRESS NOTES
[facility]  OCCUPATIONAL THERAPY  [] EVALUATION  [x] DAILY NOTE (LAND) [] DAILY NOTE (AQUATIC ) [] PROGRESS NOTE [] DISCHARGE NOTE    [] OUTPATIENT REHABILITATION [facility]   [] [place] [facility]    [x] [facility]   [] [facility]    Date: 10/18/2024  Patient Name:  Ousmane Lockett  : 1965  MRN: [id number]  CSN: [id number]    Referring Practitioner Quinn Lovelace MD [id number]      Diagnosis Presence of right artificial shoulder joint   Treatment Diagnosis M25.511  Right Shoulder Pain  M79.601  Right Arm Pain  M25.611 Stiffness of Right Shoulder   Date of Evaluation 24   Additional Pertinent History Ousmane Lockett has a past medical history of CVA (cerebral vascular accident) (HCC), Hematoma, Hx of blood clots, Hyperlipidemia, Hypertension, Non Hodgkin's lymphoma (HCC), NSCLC of right lung (HCC), and Renal transplant recipient.  he has a past surgical history that includes Appendectomy; Kidney transplant (2000); bone marrow transplant (); Facial Surgery (N/A, 10/28/2020); Colonoscopy; other surgical history; other surgical history (2000); Cholecystectomy, laparoscopic (N/A, 2021); shoulder surgery (Right, 2024); and Lung lobectomy (Right, 2024).     Allergies No Known Allergies   Medications   Current Outpatient Medications:     sirolimus (RAPAMUNE) 1 MG tablet, Take 1 tablet by mouth daily, Disp: , Rfl:     oxyCODONE (ROXICODONE) 5 MG immediate release tablet, Take 1 tablet by mouth every 6 hours as needed., Disp: , Rfl:     predniSONE (DELTASONE) 10 MG tablet, Take 1 tablet by mouth daily, Disp: , Rfl:     polycarbophil (FIBERCON) 625 MG tablet, Take 1 tablet by mouth daily, Disp: , Rfl:     Cholecalciferol (VITAMIN D3) 1.25 MG (61282 UT) CAPS, Take by mouth, Disp: , Rfl:     furosemide (LASIX) 20 MG tablet, Take 1 tablet by mouth daily (Patient taking differently: Take 1 tablet by mouth 2 times

## 2024-10-23 ENCOUNTER — HOSPITAL ENCOUNTER (OUTPATIENT)
Dept: OCCUPATIONAL THERAPY | Age: 59
Setting detail: THERAPIES SERIES
Discharge: HOME | End: 2024-10-23
Payer: COMMERCIAL

## 2024-10-23 PROCEDURE — 97110 THERAPEUTIC EXERCISES: CPT

## 2024-10-23 NOTE — PROGRESS NOTES
self.  Report having no more than occasional difficulty with dressing tasks.    Long Term Goals:  Time Frame: 20 weeks  Be able to use right arm to assist in turning steering wheel.  Be able to use right arm for normal work tasks without difficulty.  Be able to use right arm to assist with LE dressing and bathing.   Be able to sleep in bed all night without waking due to right shoulder pain.    Patient Education:   []  HEP/Education Completed: Plan of Care, Goals, HEP - to resume previous HEP given by [facility] (Pre 6 week protocol); instructed on supine ER with dowel, bilateral table slides for shoulder flexion, scapular retraction  9/27/24: pendulums, submaximal isometrics for right shoulder flexion with elbow extension and with elbow flexion, shoulder extension; use of ice for pain management  10/18/24: supine passive stretch for right elbow extension  [x]  No new Education completed  []  Reviewed Prior HEP      [x]  Patient verbalized and/or demonstrated understanding of education provided.  []  Patient unable to verbalize and/or demonstrate understanding of education provided.  Will continue education.  [x]  Barriers to learning: none    PLAN:      []  Plan of care initiated.  Plan to see patient 2 times per week for 20 weeks to address the treatment planned outlined above.  [x]  Continue with current plan of care  []  Modify plan of care as follows:    []  Hold pending physician visit  []  Discharge    Time In 1600   Time Out 1638   Timed Code Minutes: 38 min   Total Treatment Time: 38 min       Aminata SCHULER OTR/L [id number]

## 2024-10-25 ENCOUNTER — HOSPITAL ENCOUNTER (OUTPATIENT)
Dept: OCCUPATIONAL THERAPY | Age: 59
Setting detail: THERAPIES SERIES
Discharge: HOME | End: 2024-10-25
Payer: COMMERCIAL

## 2024-10-25 PROCEDURE — 97110 THERAPEUTIC EXERCISES: CPT

## 2024-10-25 NOTE — PROGRESS NOTES
[facility]  OCCUPATIONAL THERAPY  [] EVALUATION  [x] DAILY NOTE (LAND) [] DAILY NOTE (AQUATIC ) [] PROGRESS NOTE [] DISCHARGE NOTE    [] OUTPATIENT REHABILITATION [facility]   [] [place] [facility]    [x] [facility]   [] [facility]    Date: 10/25/2024  Patient Name:  Ousmane Lockett  : 1965  MRN: [id number]  CSN: [id number]    Referring Practitioner Quinn Lovelace MD [id number]      Diagnosis Presence of right artificial shoulder joint   Treatment Diagnosis M25.511  Right Shoulder Pain  M79.601  Right Arm Pain  M25.611 Stiffness of Right Shoulder   Date of Evaluation 24   Additional Pertinent History Ousmane Lockett has a past medical history of CVA (cerebral vascular accident) (HCC), Hematoma, Hx of blood clots, Hyperlipidemia, Hypertension, Non Hodgkin's lymphoma (HCC), NSCLC of right lung (HCC), and Renal transplant recipient.  he has a past surgical history that includes Appendectomy; Kidney transplant (2000); bone marrow transplant (); Facial Surgery (N/A, 10/28/2020); Colonoscopy; other surgical history; other surgical history (2000); Cholecystectomy, laparoscopic (N/A, 2021); shoulder surgery (Right, 2024); and Lung lobectomy (Right, 2024).     Allergies No Known Allergies   Medications   Current Outpatient Medications:     sirolimus (RAPAMUNE) 1 MG tablet, Take 1 tablet by mouth daily, Disp: , Rfl:     oxyCODONE (ROXICODONE) 5 MG immediate release tablet, Take 1 tablet by mouth every 6 hours as needed., Disp: , Rfl:     predniSONE (DELTASONE) 10 MG tablet, Take 1 tablet by mouth daily, Disp: , Rfl:     polycarbophil (FIBERCON) 625 MG tablet, Take 1 tablet by mouth daily, Disp: , Rfl:     Cholecalciferol (VITAMIN D3) 1.25 MG (30961 UT) CAPS, Take by mouth, Disp: , Rfl:     furosemide (LASIX) 20 MG tablet, Take 1 tablet by mouth daily (Patient taking differently: Take 1 tablet by mouth 2 times

## 2024-10-28 ENCOUNTER — HOSPITAL ENCOUNTER (OUTPATIENT)
Dept: OCCUPATIONAL THERAPY | Age: 59
Setting detail: THERAPIES SERIES
Discharge: HOME | End: 2024-10-28
Payer: COMMERCIAL

## 2024-10-28 PROCEDURE — 97110 THERAPEUTIC EXERCISES: CPT

## 2024-10-28 RX ORDER — PREDNISONE 10 MG/1
10 TABLET ORAL DAILY
Qty: 90 TABLET | Refills: 1 | Status: SHIPPED | OUTPATIENT
Start: 2024-10-28

## 2024-10-28 NOTE — TELEPHONE ENCOUNTER
This is a transplant patient.  Please confirm with patient that he is still taking prednisone 10 mg daily.

## 2024-10-28 NOTE — TELEPHONE ENCOUNTER
Left message asking pt to please call the office to confirm if he is still taking prednisone 10 mg daily.

## 2024-10-28 NOTE — PROGRESS NOTES
[facility]  OCCUPATIONAL THERAPY  [] EVALUATION  [x] DAILY NOTE (LAND) [] DAILY NOTE (AQUATIC ) [] PROGRESS NOTE [] DISCHARGE NOTE    [] OUTPATIENT REHABILITATION [facility]   [] [place] [facility]    [x] [facility]   [] [facility]    Date: 10/28/2024  Patient Name:  Ousmane Lockett  : 1965  MRN: [id number]  CSN: [id number]    Referring Practitioner Quinn Lovelace MD [id number]      Diagnosis Presence of right artificial shoulder joint   Treatment Diagnosis M25.511  Right Shoulder Pain  M79.601  Right Arm Pain  M25.611 Stiffness of Right Shoulder   Date of Evaluation 24   Additional Pertinent History Ousmane Lockett has a past medical history of CVA (cerebral vascular accident) (HCC), Hematoma, Hx of blood clots, Hyperlipidemia, Hypertension, Non Hodgkin's lymphoma (HCC), NSCLC of right lung (HCC), and Renal transplant recipient.  he has a past surgical history that includes Appendectomy; Kidney transplant (2000); bone marrow transplant (); Facial Surgery (N/A, 10/28/2020); Colonoscopy; other surgical history; other surgical history (2000); Cholecystectomy, laparoscopic (N/A, 2021); shoulder surgery (Right, 2024); and Lung lobectomy (Right, 2024).     Allergies No Known Allergies   Medications   Current Outpatient Medications:     sirolimus (RAPAMUNE) 1 MG tablet, Take 1 tablet by mouth daily, Disp: , Rfl:     oxyCODONE (ROXICODONE) 5 MG immediate release tablet, Take 1 tablet by mouth every 6 hours as needed., Disp: , Rfl:     predniSONE (DELTASONE) 10 MG tablet, Take 1 tablet by mouth daily, Disp: , Rfl:     polycarbophil (FIBERCON) 625 MG tablet, Take 1 tablet by mouth daily, Disp: , Rfl:     Cholecalciferol (VITAMIN D3) 1.25 MG (29217 UT) CAPS, Take by mouth, Disp: , Rfl:     furosemide (LASIX) 20 MG tablet, Take 1 tablet by mouth daily (Patient taking differently: Take 1 tablet by mouth 2 times

## 2024-10-30 ENCOUNTER — HOSPITAL ENCOUNTER (OUTPATIENT)
Dept: OCCUPATIONAL THERAPY | Age: 59
Setting detail: THERAPIES SERIES
Discharge: HOME | End: 2024-10-30
Payer: COMMERCIAL

## 2024-10-30 PROCEDURE — 97110 THERAPEUTIC EXERCISES: CPT

## 2024-10-30 NOTE — PROGRESS NOTES
drain    “X” in shaded column indicates Activity Completed Today   Modalities Parameters/  Location  Notes/Comments                     Manual Therapy Time/  Technique  Notes/Comments   STM to right bicep region                  Exercises   Sets/  Sec Reps  Notes/Comments   ER at side with dowel chele while in supine 1 10     Bilateral table slides for shoulder flexion  1 15 x 5-10 second hold   Scapular retraction 1 15 x    Pendulums with right UE 1  10 each x    Submaximal isometrics - shoulder flexion with elbow flexed and with elbow extended, shoulder abduction 1 10 each     Supine PROM to right shoulder for ER at side and flexion;    x Passively externally rotated right shoulder while bringing arm down from flexed position - patient reported that this was slightly less painful when completing   Passive stretching of right elbow extension while supine and elbow on therapist's hand  1 minute x    Supine passive right elbow flexion/extension   x Gentle PROM only; No hard end feel present with PROM   Activities Time    Notes/Comments   Readjusted sling to move abductor pillow further back in sling                    Specific Interventions Next Treatment: advance per [facility] protocol    Activity/Treatment Tolerance:  [x]  Patient tolerated treatment well  []  Patient limited by fatigue  []  Patient limited by pain   []  Patient limited by other medical complications  []  Other:     Assessment: Patient is slowly progressing toward goals.       GOALS:  Patient Goal: Increase mobility in right shoulder.     Short Term Goals:  Time Frame: 4 weeks  Be independent with HEP as instructed to increase his ability to complete dressing and bathing tasks.   Increase passive right shoulder flexion to 110 and ER at side to 25 to increase his flexibility for eventual active use of right shoulder for dressing tasks.  Report pain going on higher than 3/10 at any time to increase his ability to eventually use his right arm to feed

## 2024-11-04 ENCOUNTER — HOSPITAL ENCOUNTER (OUTPATIENT)
Dept: OCCUPATIONAL THERAPY | Age: 59
Setting detail: THERAPIES SERIES
Discharge: HOME | End: 2024-11-04
Payer: COMMERCIAL

## 2024-11-04 PROCEDURE — 97110 THERAPEUTIC EXERCISES: CPT

## 2024-11-04 NOTE — PROGRESS NOTES
[facility]  OCCUPATIONAL THERAPY  [] EVALUATION  [] DAILY NOTE (LAND) [] DAILY NOTE (AQUATIC ) [x] PROGRESS NOTE [] DISCHARGE NOTE    [] OUTPATIENT REHABILITATION [facility]   [] [place] [facility]    [x] [facility]   [] [facility]    Date: 2024  Patient Name:  Ousmane Lockett  : 1965  MRN: [id number]  CSN: [id number]    Referring Practitioner Quinn Lovelace MD [id number]      Diagnosis Presence of right artificial shoulder joint   Treatment Diagnosis M25.511  Right Shoulder Pain  M79.601  Right Arm Pain  M25.611 Stiffness of Right Shoulder   Date of Evaluation 24   Additional Pertinent History Ousmane Lockett has a past medical history of CVA (cerebral vascular accident) (HCC), Hematoma, Hx of blood clots, Hyperlipidemia, Hypertension, Non Hodgkin's lymphoma (HCC), NSCLC of right lung (HCC), and Renal transplant recipient.  he has a past surgical history that includes Appendectomy; Kidney transplant (2000); bone marrow transplant (); Facial Surgery (N/A, 10/28/2020); Colonoscopy; other surgical history; other surgical history (2000); Cholecystectomy, laparoscopic (N/A, 2021); shoulder surgery (Right, 2024); and Lung lobectomy (Right, 2024).     Allergies No Known Allergies   Medications   Current Outpatient Medications:     predniSONE (DELTASONE) 10 MG tablet, TAKE 1 TABLET DAILY, Disp: 90 tablet, Rfl: 1    sirolimus (RAPAMUNE) 1 MG tablet, Take 1 tablet by mouth daily, Disp: , Rfl:     oxyCODONE (ROXICODONE) 5 MG immediate release tablet, Take 1 tablet by mouth every 6 hours as needed., Disp: , Rfl:     polycarbophil (FIBERCON) 625 MG tablet, Take 1 tablet by mouth daily, Disp: , Rfl:     Cholecalciferol (VITAMIN D3) 1.25 MG (63722 UT) CAPS, Take by mouth, Disp: , Rfl:     furosemide (LASIX) 20 MG tablet, Take 1 tablet by mouth daily (Patient taking differently: Take 1 tablet by mouth 2 times

## 2024-11-05 PROBLEM — R79.89 ELEVATED TROPONIN: Status: RESOLVED | Noted: 2024-10-06 | Resolved: 2024-11-05

## 2024-11-06 ENCOUNTER — HOSPITAL ENCOUNTER (OUTPATIENT)
Dept: OCCUPATIONAL THERAPY | Age: 59
Setting detail: THERAPIES SERIES
Discharge: HOME | End: 2024-11-06
Payer: COMMERCIAL

## 2024-11-06 PROCEDURE — 97110 THERAPEUTIC EXERCISES: CPT

## 2024-11-06 NOTE — PROGRESS NOTES
[facility]  OCCUPATIONAL THERAPY  [] EVALUATION  [x] DAILY NOTE (LAND) [] DAILY NOTE (AQUATIC ) [] PROGRESS NOTE [] DISCHARGE NOTE    [] OUTPATIENT REHABILITATION [facility]   [] [place] [facility]    [x] [facility]   [facility]    Date: 2024  Patient Name:  Ousmane Lockett  : 1965  MRN: [id number]  CSN: [id number]    Referring Practitioner Quinn Lovelace MD [id number]      Diagnosis Presence of right artificial shoulder joint   Treatment Diagnosis M25.511  Right Shoulder Pain  M79.601  Right Arm Pain  M25.611 Stiffness of Right Shoulder   Date of Evaluation 24   Additional Pertinent History Ousmane Lockett has a past medical history of CVA (cerebral vascular accident) (HCC), Hematoma, Hx of blood clots, Hyperlipidemia, Hypertension, Non Hodgkin's lymphoma (HCC), NSCLC of right lung (HCC), and Renal transplant recipient.  he has a past surgical history that includes Appendectomy; Kidney transplant (2000); bone marrow transplant (); Facial Surgery (N/A, 10/28/2020); Colonoscopy; other surgical history; other surgical history (2000); Cholecystectomy, laparoscopic (N/A, 2021); shoulder surgery (Right, 2024); and Lung lobectomy (Right, 2024).     Allergies No Known Allergies   Medications   Current Outpatient Medications:     predniSONE (DELTASONE) 10 MG tablet, TAKE 1 TABLET DAILY, Disp: 90 tablet, Rfl: 1    sirolimus (RAPAMUNE) 1 MG tablet, Take 1 tablet by mouth daily, Disp: , Rfl:     oxyCODONE (ROXICODONE) 5 MG immediate release tablet, Take 1 tablet by mouth every 6 hours as needed., Disp: , Rfl:     polycarbophil (FIBERCON) 625 MG tablet, Take 1 tablet by mouth daily, Disp: , Rfl:     Cholecalciferol (VITAMIN D3) 1.25 MG (94834 UT) CAPS, Take by mouth, Disp: , Rfl:     furosemide (LASIX) 20 MG tablet, Take 1 tablet by mouth daily (Patient taking differently: Take 1 tablet by mouth 2 times

## 2024-11-11 ENCOUNTER — HOSPITAL ENCOUNTER (OUTPATIENT)
Dept: OCCUPATIONAL THERAPY | Age: 59
Setting detail: THERAPIES SERIES
Discharge: HOME | End: 2024-11-11
Payer: COMMERCIAL

## 2024-11-11 PROCEDURE — 97110 THERAPEUTIC EXERCISES: CPT

## 2024-11-11 NOTE — PROGRESS NOTES
[facility]  OCCUPATIONAL THERAPY  [] EVALUATION  [x] DAILY NOTE (LAND) [] DAILY NOTE (AQUATIC ) [] PROGRESS NOTE [] DISCHARGE NOTE    [] OUTPATIENT REHABILITATION [facility]   [] [place] [facility]    [x] [facility]   [facility]    Date: 2024  Patient Name:  Ousmane Lockett  : 1965  MRN: [id number]  CSN: [id number]    Referring Practitioner Quinn Lovelace MD [id number]      Diagnosis Presence of right artificial shoulder joint   Treatment Diagnosis M25.511  Right Shoulder Pain  M79.601  Right Arm Pain  M25.611 Stiffness of Right Shoulder   Date of Evaluation 24   Additional Pertinent History Ousmane Lockett has a past medical history of CVA (cerebral vascular accident) (HCC), Hematoma, Hx of blood clots, Hyperlipidemia, Hypertension, Non Hodgkin's lymphoma (HCC), NSCLC of right lung (HCC), and Renal transplant recipient.  he has a past surgical history that includes Appendectomy; Kidney transplant (2000); bone marrow transplant (); Facial Surgery (N/A, 10/28/2020); Colonoscopy; other surgical history; other surgical history (2000); Cholecystectomy, laparoscopic (N/A, 2021); shoulder surgery (Right, 2024); and Lung lobectomy (Right, 2024).     Allergies No Known Allergies   Medications   Current Outpatient Medications:     predniSONE (DELTASONE) 10 MG tablet, TAKE 1 TABLET DAILY, Disp: 90 tablet, Rfl: 1    sirolimus (RAPAMUNE) 1 MG tablet, Take 1 tablet by mouth daily, Disp: , Rfl:     oxyCODONE (ROXICODONE) 5 MG immediate release tablet, Take 1 tablet by mouth every 6 hours as needed., Disp: , Rfl:     polycarbophil (FIBERCON) 625 MG tablet, Take 1 tablet by mouth daily, Disp: , Rfl:     Cholecalciferol (VITAMIN D3) 1.25 MG (21273 UT) CAPS, Take by mouth, Disp: , Rfl:     furosemide (LASIX) 20 MG tablet, Take 1 tablet by mouth daily (Patient taking differently: Take 1 tablet by mouth 2 times

## 2024-11-14 ENCOUNTER — LAB (OUTPATIENT)
Dept: LAB | Age: 59
End: 2024-11-14

## 2024-11-15 ENCOUNTER — HOSPITAL ENCOUNTER (OUTPATIENT)
Dept: OCCUPATIONAL THERAPY | Age: 59
Setting detail: THERAPIES SERIES
Discharge: HOME | End: 2024-11-15
Payer: COMMERCIAL

## 2024-11-15 PROCEDURE — 97110 THERAPEUTIC EXERCISES: CPT

## 2024-11-15 NOTE — PROGRESS NOTES
[facility]  OCCUPATIONAL THERAPY  [] EVALUATION  [x] DAILY NOTE (LAND) [] DAILY NOTE (AQUATIC ) [] PROGRESS NOTE [] DISCHARGE NOTE    [] OUTPATIENT REHABILITATION [facility]   [] [facility]    [x] [facility]   [] [facility]    Date: 11/15/2024  Patient Name:  Ousmane Lockett  : 1965  MRN: [id number]  CSN: [id number]    Referring Practitioner Quinn Lovelace MD [id number]      Diagnosis Presence of right artificial shoulder joint   Treatment Diagnosis M25.511  Right Shoulder Pain  M79.601  Right Arm Pain  M25.611 Stiffness of Right Shoulder   Date of Evaluation 24   Additional Pertinent History Ousmane Lockett has a past medical history of CVA (cerebral vascular accident) (HCC), Hematoma, Hx of blood clots, Hyperlipidemia, Hypertension, Non Hodgkin's lymphoma (HCC), NSCLC of right lung (HCC), and Renal transplant recipient.  he has a past surgical history that includes Appendectomy; Kidney transplant (2000); bone marrow transplant (); Facial Surgery (N/A, 10/28/2020); Colonoscopy; other surgical history; other surgical history (2000); Cholecystectomy, laparoscopic (N/A, 2021); shoulder surgery (Right, 2024); and Lung lobectomy (Right, 2024).     Allergies No Known Allergies   Medications   Current Outpatient Medications:     predniSONE (DELTASONE) 10 MG tablet, TAKE 1 TABLET DAILY, Disp: 90 tablet, Rfl: 1    sirolimus (RAPAMUNE) 1 MG tablet, Take 1 tablet by mouth daily, Disp: , Rfl:     oxyCODONE (ROXICODONE) 5 MG immediate release tablet, Take 1 tablet by mouth every 6 hours as needed., Disp: , Rfl:     polycarbophil (FIBERCON) 625 MG tablet, Take 1 tablet by mouth daily, Disp: , Rfl:     Cholecalciferol (VITAMIN D3) 1.25 MG (46704 UT) CAPS, Take by mouth, Disp: , Rfl:     furosemide (LASIX) 20 MG tablet, Take 1 tablet by mouth daily (Patient taking differently: Take 1 tablet by mouth 2 times

## 2024-11-16 ENCOUNTER — LAB (OUTPATIENT)
Dept: LAB | Age: 59
End: 2024-11-16

## 2024-11-16 LAB
ANION GAP SERPL CALC-SCNC: 11 MEQ/L (ref 8–16)
BUN SERPL-MCNC: 12 MG/DL (ref 7–22)
CHLORIDE SERPL-SCNC: 99 MEQ/L (ref 98–111)
CO2 SERPL-SCNC: 27 MEQ/L (ref 23–33)
CREAT SERPL-MCNC: 0.6 MG/DL (ref 0.4–1.2)
GFR SERPL CREATININE-BSD FRML MDRD: > 90 ML/MIN/1.73M2
POTASSIUM SERPL-SCNC: 4.4 MEQ/L (ref 3.5–5.2)
SIROLIMUS BLD-MCNC: 3.8 NG/ML
SODIUM SERPL-SCNC: 137 MEQ/L (ref 135–145)

## 2024-11-18 ENCOUNTER — HOSPITAL ENCOUNTER (OUTPATIENT)
Dept: OCCUPATIONAL THERAPY | Age: 59
Setting detail: THERAPIES SERIES
Discharge: HOME | End: 2024-11-18
Payer: COMMERCIAL

## 2024-11-18 PROCEDURE — 97110 THERAPEUTIC EXERCISES: CPT

## 2024-11-18 NOTE — PROGRESS NOTES
[facility]  OCCUPATIONAL THERAPY  [] EVALUATION  [x] DAILY NOTE (LAND) [] DAILY NOTE (AQUATIC ) [] PROGRESS NOTE [] DISCHARGE NOTE    [] OUTPATIENT REHABILITATION [facility]   [] [facility] [facility]    [x] [facility]   [] [facility]    Date: 2024  Patient Name:  Ousmane Lockett  : 1965  MRN: [id number]  CSN: [id number]    Referring Practitioner Quinn Lovelace MD [id number]      Diagnosis Presence of right artificial shoulder joint   Treatment Diagnosis M25.511  Right Shoulder Pain  M79.601  Right Arm Pain  M25.611 Stiffness of Right Shoulder   Date of Evaluation 24   Additional Pertinent History Ousmane Lockett has a past medical history of CVA (cerebral vascular accident) (HCC), Hematoma, Hx of blood clots, Hyperlipidemia, Hypertension, Non Hodgkin's lymphoma (HCC), NSCLC of right lung (HCC), and Renal transplant recipient.  he has a past surgical history that includes Appendectomy; Kidney transplant (2000); bone marrow transplant (); Facial Surgery (N/A, 10/28/2020); Colonoscopy; other surgical history; other surgical history (2000); Cholecystectomy, laparoscopic (N/A, 2021); shoulder surgery (Right, 2024); and Lung lobectomy (Right, 2024).     Allergies No Known Allergies   Medications   Current Outpatient Medications:     predniSONE (DELTASONE) 10 MG tablet, TAKE 1 TABLET DAILY, Disp: 90 tablet, Rfl: 1    sirolimus (RAPAMUNE) 1 MG tablet, Take 1 tablet by mouth daily, Disp: , Rfl:     oxyCODONE (ROXICODONE) 5 MG immediate release tablet, Take 1 tablet by mouth every 6 hours as needed., Disp: , Rfl:     polycarbophil (FIBERCON) 625 MG tablet, Take 1 tablet by mouth daily, Disp: , Rfl:     Cholecalciferol (VITAMIN D3) 1.25 MG (62796 UT) CAPS, Take by mouth, Disp: , Rfl:     furosemide (LASIX) 20 MG tablet, Take 1 tablet by mouth daily (Patient taking differently: Take 1 tablet by mouth 2 times

## 2024-11-20 ENCOUNTER — HOSPITAL ENCOUNTER (OUTPATIENT)
Dept: OCCUPATIONAL THERAPY | Age: 59
Setting detail: THERAPIES SERIES
Discharge: HOME | End: 2024-11-20
Payer: COMMERCIAL

## 2024-11-20 PROCEDURE — 97110 THERAPEUTIC EXERCISES: CPT

## 2024-11-20 NOTE — PROGRESS NOTES
[facility]  OCCUPATIONAL THERAPY  [] EVALUATION  [x] DAILY NOTE (LAND) [] DAILY NOTE (AQUATIC ) [] PROGRESS NOTE [] DISCHARGE NOTE    [] OUTPATIENT REHABILITATION [facility]   [] [place] [facility]    [x] [facility]   [facility]    Date: 2024  Patient Name:  Ousmane Lockett  : 1965  MRN: [id number]  CSN: [id number]    Referring Practitioner Quinn Lovelace MD [id number]      Diagnosis Presence of right artificial shoulder joint   Treatment Diagnosis M25.511  Right Shoulder Pain  M79.601  Right Arm Pain  M25.611 Stiffness of Right Shoulder   Date of Evaluation 24   Additional Pertinent History Ousmane Lockett has a past medical history of CVA (cerebral vascular accident) (HCC), Hematoma, Hx of blood clots, Hyperlipidemia, Hypertension, Non Hodgkin's lymphoma (HCC), NSCLC of right lung (HCC), and Renal transplant recipient.  he has a past surgical history that includes Appendectomy; Kidney transplant (2000); bone marrow transplant (); Facial Surgery (N/A, 10/28/2020); Colonoscopy; other surgical history; other surgical history (2000); Cholecystectomy, laparoscopic (N/A, 2021); shoulder surgery (Right, 2024); and Lung lobectomy (Right, 2024).     Allergies No Known Allergies   Medications   Current Outpatient Medications:     predniSONE (DELTASONE) 10 MG tablet, TAKE 1 TABLET DAILY, Disp: 90 tablet, Rfl: 1    sirolimus (RAPAMUNE) 1 MG tablet, Take 1 tablet by mouth daily, Disp: , Rfl:     oxyCODONE (ROXICODONE) 5 MG immediate release tablet, Take 1 tablet by mouth every 6 hours as needed., Disp: , Rfl:     polycarbophil (FIBERCON) 625 MG tablet, Take 1 tablet by mouth daily, Disp: , Rfl:     Cholecalciferol (VITAMIN D3) 1.25 MG (76982 UT) CAPS, Take by mouth, Disp: , Rfl:     furosemide (LASIX) 20 MG tablet, Take 1 tablet by mouth daily (Patient taking differently: Take 1 tablet by mouth 2 times

## 2024-11-25 ENCOUNTER — HOSPITAL ENCOUNTER (OUTPATIENT)
Dept: OCCUPATIONAL THERAPY | Age: 59
Setting detail: THERAPIES SERIES
Discharge: HOME | End: 2024-11-25
Payer: COMMERCIAL

## 2024-11-25 PROCEDURE — 97110 THERAPEUTIC EXERCISES: CPT

## 2024-11-25 NOTE — PROGRESS NOTES
provided.  []  Patient unable to verbalize and/or demonstrate understanding of education provided.  Will continue education.  [x]  Barriers to learning: none    PLAN:      []  Plan of care initiated.  Plan to see patient 2 times per week for 20 weeks to address the treatment planned outlined above.  [x]  Continue with current plan of care  []  Modify plan of care as follows:  See patient 2 x week x 14 weeks from 11/4/24  []  Hold pending physician visit  []  Discharge    Time In 1515   Time Out 1653   Timed Code Minutes: 38 min   Total Treatment Time: 38 min       Aminata Roblero MOT OTR/L [id number]

## 2024-11-27 ENCOUNTER — APPOINTMENT (OUTPATIENT)
Dept: OCCUPATIONAL THERAPY | Age: 59
End: 2024-11-27
Payer: COMMERCIAL

## 2024-11-30 ENCOUNTER — APPOINTMENT (OUTPATIENT)
Dept: GENERAL RADIOLOGY | Age: 59
End: 2024-11-30
Payer: COMMERCIAL

## 2024-11-30 ENCOUNTER — HOSPITAL ENCOUNTER (INPATIENT)
Age: 59
LOS: 6 days | Discharge: HOME | End: 2024-12-06
Attending: EMERGENCY MEDICINE | Admitting: INTERNAL MEDICINE
Payer: COMMERCIAL

## 2024-11-30 DIAGNOSIS — Z85.118 HISTORY OF LUNG CANCER: ICD-10-CM

## 2024-11-30 DIAGNOSIS — A41.9 SEPSIS WITHOUT ACUTE ORGAN DYSFUNCTION, DUE TO UNSPECIFIED ORGANISM (HCC): Primary | ICD-10-CM

## 2024-11-30 DIAGNOSIS — I48.0 PAROXYSMAL ATRIAL FIBRILLATION (HCC): ICD-10-CM

## 2024-11-30 DIAGNOSIS — I48.91 ATRIAL FIBRILLATION, UNSPECIFIED TYPE (HCC): ICD-10-CM

## 2024-11-30 DIAGNOSIS — M79.89 LOCALIZED SWELLING OF LOWER EXTREMITY: ICD-10-CM

## 2024-11-30 DIAGNOSIS — J18.9 PNEUMONIA OF RIGHT LOWER LOBE DUE TO INFECTIOUS ORGANISM: ICD-10-CM

## 2024-11-30 LAB
ANION GAP SERPL CALC-SCNC: 13 MEQ/L (ref 8–16)
ANISOCYTOSIS BLD QL SMEAR: PRESENT
BACTERIA URNS QL MICRO: ABNORMAL /HPF
BASOPHILS ABSOLUTE: 0 THOU/MM3 (ref 0–0.1)
BASOPHILS NFR BLD AUTO: 0.1 %
BILIRUB UR QL STRIP.AUTO: NEGATIVE
BUN SERPL-MCNC: 20 MG/DL (ref 7–22)
CALCIUM SERPL-MCNC: 8.7 MG/DL (ref 8.5–10.5)
CASTS #/AREA URNS LPF: ABNORMAL /LPF
CASTS 2: ABNORMAL /LPF
CHARACTER UR: ABNORMAL
CHLORIDE SERPL-SCNC: 95 MEQ/L (ref 98–111)
CO2 SERPL-SCNC: 25 MEQ/L (ref 23–33)
COLOR, UA: YELLOW
CREAT SERPL-MCNC: 0.7 MG/DL (ref 0.4–1.2)
CRYSTALS URNS MICRO: ABNORMAL
DEPRECATED RDW RBC AUTO: 46.2 FL (ref 35–45)
ELLIPTOCYTES: ABNORMAL
EOSINOPHIL NFR BLD AUTO: 0.3 %
EOSINOPHILS ABSOLUTE: 0 THOU/MM3 (ref 0–0.4)
EPITHELIAL CELLS, UA: ABNORMAL /HPF
ERYTHROCYTE [DISTWIDTH] IN BLOOD BY AUTOMATED COUNT: 17.5 % (ref 11.5–14.5)
FLUAV RNA RESP QL NAA+PROBE: NOT DETECTED
FLUBV RNA RESP QL NAA+PROBE: NOT DETECTED
GFR SERPL CREATININE-BSD FRML MDRD: > 90 ML/MIN/1.73M2
GLUCOSE SERPL-MCNC: 127 MG/DL (ref 70–108)
GLUCOSE UR QL STRIP.AUTO: NEGATIVE MG/DL
HCT VFR BLD AUTO: 33.6 % (ref 42–52)
HGB BLD-MCNC: 9.5 GM/DL (ref 14–18)
HGB UR QL STRIP.AUTO: NEGATIVE
HYPOCHROMIA BLD QL SMEAR: PRESENT
IMM GRANULOCYTES # BLD AUTO: 0.07 THOU/MM3 (ref 0–0.07)
IMM GRANULOCYTES NFR BLD AUTO: 0.5 %
KETONES UR QL STRIP.AUTO: ABNORMAL
LACTATE SERPL-SCNC: 2.4 MMOL/L (ref 0.5–2)
LYMPHOCYTES ABSOLUTE: 2.1 THOU/MM3 (ref 1–4.8)
LYMPHOCYTES NFR BLD AUTO: 14.2 %
MAGNESIUM SERPL-MCNC: 1.9 MG/DL (ref 1.6–2.4)
MCH RBC QN AUTO: 21 PG (ref 26–33)
MCHC RBC AUTO-ENTMCNC: 28.3 GM/DL (ref 32.2–35.5)
MCV RBC AUTO: 74.2 FL (ref 80–94)
MISCELLANEOUS 2: ABNORMAL
MONOCYTES ABSOLUTE: 0.2 THOU/MM3 (ref 0.4–1.3)
MONOCYTES NFR BLD AUTO: 1.1 %
NEUTROPHILS ABSOLUTE: 12.2 THOU/MM3 (ref 1.8–7.7)
NEUTROPHILS NFR BLD AUTO: 83.8 %
NITRITE UR QL STRIP: NEGATIVE
NRBC BLD AUTO-RTO: 0 /100 WBC
OSMOLALITY SERPL CALC.SUM OF ELEC: 270.6 MOSMOL/KG (ref 275–300)
PH UR STRIP.AUTO: 6 [PH] (ref 5–9)
PLATELET # BLD AUTO: 353 THOU/MM3 (ref 130–400)
PLATELET BLD QL SMEAR: ADEQUATE
PMV BLD AUTO: 9.2 FL (ref 9.4–12.4)
POIKILOCYTES: ABNORMAL
POTASSIUM SERPL-SCNC: 4 MEQ/L (ref 3.5–5.2)
PROT UR STRIP.AUTO-MCNC: ABNORMAL MG/DL
RBC # BLD AUTO: 4.53 MILL/MM3 (ref 4.7–6.1)
RBC URINE: ABNORMAL /HPF
RENAL EPI CELLS #/AREA URNS HPF: ABNORMAL /[HPF]
SARS-COV-2 RNA RESP QL NAA+PROBE: NOT DETECTED
SCAN OF BLOOD SMEAR: NORMAL
SODIUM SERPL-SCNC: 133 MEQ/L (ref 135–145)
SP GR UR REFRACT.AUTO: 1.03 (ref 1–1.03)
UROBILINOGEN, URINE: 0.2 EU/DL (ref 0–1)
WBC # BLD AUTO: 14.6 THOU/MM3 (ref 4.8–10.8)
WBC #/AREA URNS HPF: ABNORMAL /HPF
WBC #/AREA URNS HPF: NEGATIVE /[HPF]
YEAST LIKE FUNGI URNS QL MICRO: ABNORMAL

## 2024-11-30 PROCEDURE — 89051 BODY FLUID CELL COUNT: CPT

## 2024-11-30 PROCEDURE — 88108 CYTOPATH CONCENTRATE TECH: CPT

## 2024-11-30 PROCEDURE — 2580000003 HC RX 258: Performed by: INTERNAL MEDICINE

## 2024-11-30 PROCEDURE — 2500000003 HC RX 250 WO HCPCS: Performed by: INTERNAL MEDICINE

## 2024-11-30 PROCEDURE — 71046 X-RAY EXAM CHEST 2 VIEWS: CPT

## 2024-11-30 PROCEDURE — 93005 ELECTROCARDIOGRAM TRACING: CPT

## 2024-11-30 PROCEDURE — 96374 THER/PROPH/DIAG INJ IV PUSH: CPT

## 2024-11-30 PROCEDURE — 94640 AIRWAY INHALATION TREATMENT: CPT

## 2024-11-30 PROCEDURE — 2580000003 HC RX 258

## 2024-11-30 PROCEDURE — 6370000000 HC RX 637 (ALT 250 FOR IP)

## 2024-11-30 PROCEDURE — 6360000002 HC RX W HCPCS

## 2024-11-30 PROCEDURE — 85025 COMPLETE CBC W/AUTO DIFF WBC: CPT

## 2024-11-30 PROCEDURE — 87040 BLOOD CULTURE FOR BACTERIA: CPT

## 2024-11-30 PROCEDURE — 93005 ELECTROCARDIOGRAM TRACING: CPT | Performed by: EMERGENCY MEDICINE

## 2024-11-30 PROCEDURE — 6360000002 HC RX W HCPCS: Performed by: EMERGENCY MEDICINE

## 2024-11-30 PROCEDURE — 87636 SARSCOV2 & INF A&B AMP PRB: CPT

## 2024-11-30 PROCEDURE — 80048 BASIC METABOLIC PNL TOTAL CA: CPT

## 2024-11-30 PROCEDURE — 94669 MECHANICAL CHEST WALL OSCILL: CPT

## 2024-11-30 PROCEDURE — 87205 SMEAR GRAM STAIN: CPT

## 2024-11-30 PROCEDURE — 87641 MR-STAPH DNA AMP PROBE: CPT

## 2024-11-30 PROCEDURE — 99285 EMERGENCY DEPT VISIT HI MDM: CPT

## 2024-11-30 PROCEDURE — 2580000003 HC RX 258: Performed by: EMERGENCY MEDICINE

## 2024-11-30 PROCEDURE — 81001 URINALYSIS AUTO W/SCOPE: CPT

## 2024-11-30 PROCEDURE — 87070 CULTURE OTHR SPECIMN AEROBIC: CPT

## 2024-11-30 PROCEDURE — 87075 CULTR BACTERIA EXCEPT BLOOD: CPT

## 2024-11-30 PROCEDURE — 87801 DETECT AGNT MULT DNA AMPLI: CPT

## 2024-11-30 PROCEDURE — 2140000000 HC CCU INTERMEDIATE R&B

## 2024-11-30 PROCEDURE — 36415 COLL VENOUS BLD VENIPUNCTURE: CPT

## 2024-11-30 PROCEDURE — 87147 CULTURE TYPE IMMUNOLOGIC: CPT

## 2024-11-30 PROCEDURE — 83605 ASSAY OF LACTIC ACID: CPT

## 2024-11-30 PROCEDURE — 51798 US URINE CAPACITY MEASURE: CPT

## 2024-11-30 PROCEDURE — 83735 ASSAY OF MAGNESIUM: CPT

## 2024-11-30 PROCEDURE — 99223 1ST HOSP IP/OBS HIGH 75: CPT | Performed by: INTERNAL MEDICINE

## 2024-11-30 RX ORDER — ALBUTEROL SULFATE 0.83 MG/ML
2.5 SOLUTION RESPIRATORY (INHALATION) EVERY 6 HOURS PRN
Status: DISCONTINUED | OUTPATIENT
Start: 2024-11-30 | End: 2024-11-30

## 2024-11-30 RX ORDER — ONDANSETRON 4 MG/1
4 TABLET, ORALLY DISINTEGRATING ORAL EVERY 8 HOURS PRN
Status: DISCONTINUED | OUTPATIENT
Start: 2024-11-30 | End: 2024-12-06 | Stop reason: HOSPADM

## 2024-11-30 RX ORDER — POLYETHYLENE GLYCOL 3350 17 G/17G
17 POWDER, FOR SOLUTION ORAL DAILY PRN
Status: DISCONTINUED | OUTPATIENT
Start: 2024-11-30 | End: 2024-12-06 | Stop reason: HOSPADM

## 2024-11-30 RX ORDER — ONDANSETRON 2 MG/ML
4 INJECTION INTRAMUSCULAR; INTRAVENOUS EVERY 6 HOURS PRN
Status: DISCONTINUED | OUTPATIENT
Start: 2024-11-30 | End: 2024-12-06 | Stop reason: HOSPADM

## 2024-11-30 RX ORDER — METOPROLOL TARTRATE 25 MG/1
25 TABLET, FILM COATED ORAL 2 TIMES DAILY
Status: DISCONTINUED | OUTPATIENT
Start: 2024-11-30 | End: 2024-12-01

## 2024-11-30 RX ORDER — POTASSIUM CHLORIDE 1500 MG/1
40 TABLET, EXTENDED RELEASE ORAL PRN
Status: DISCONTINUED | OUTPATIENT
Start: 2024-11-30 | End: 2024-12-06 | Stop reason: HOSPADM

## 2024-11-30 RX ORDER — SODIUM CHLORIDE 9 MG/ML
INJECTION, SOLUTION INTRAVENOUS PRN
Status: DISCONTINUED | OUTPATIENT
Start: 2024-11-30 | End: 2024-12-06 | Stop reason: HOSPADM

## 2024-11-30 RX ORDER — METOPROLOL TARTRATE 25 MG/1
25 TABLET, FILM COATED ORAL 2 TIMES DAILY
Status: DISCONTINUED | OUTPATIENT
Start: 2024-11-30 | End: 2024-11-30

## 2024-11-30 RX ORDER — SODIUM CHLORIDE 0.9 % (FLUSH) 0.9 %
5-40 SYRINGE (ML) INJECTION PRN
Status: DISCONTINUED | OUTPATIENT
Start: 2024-11-30 | End: 2024-12-06 | Stop reason: HOSPADM

## 2024-11-30 RX ORDER — 0.9 % SODIUM CHLORIDE 0.9 %
1000 INTRAVENOUS SOLUTION INTRAVENOUS ONCE
Status: COMPLETED | OUTPATIENT
Start: 2024-11-30 | End: 2024-11-30

## 2024-11-30 RX ORDER — ACETAMINOPHEN 325 MG/1
650 TABLET ORAL EVERY 6 HOURS PRN
Status: DISCONTINUED | OUTPATIENT
Start: 2024-11-30 | End: 2024-12-06 | Stop reason: HOSPADM

## 2024-11-30 RX ORDER — SODIUM CHLORIDE 9 MG/ML
INJECTION, SOLUTION INTRAVENOUS CONTINUOUS
Status: ACTIVE | OUTPATIENT
Start: 2024-11-30 | End: 2024-12-01

## 2024-11-30 RX ORDER — MAGNESIUM SULFATE IN WATER 40 MG/ML
2000 INJECTION, SOLUTION INTRAVENOUS PRN
Status: DISCONTINUED | OUTPATIENT
Start: 2024-11-30 | End: 2024-12-06 | Stop reason: HOSPADM

## 2024-11-30 RX ORDER — OXYCODONE HYDROCHLORIDE 5 MG/1
5 TABLET ORAL EVERY 6 HOURS PRN
Status: DISCONTINUED | OUTPATIENT
Start: 2024-11-30 | End: 2024-12-06 | Stop reason: HOSPADM

## 2024-11-30 RX ORDER — PREDNISONE 20 MG/1
10 TABLET ORAL DAILY
Status: DISCONTINUED | OUTPATIENT
Start: 2024-11-30 | End: 2024-12-01

## 2024-11-30 RX ORDER — SIROLIMUS 1 MG/1
1 TABLET, FILM COATED ORAL DAILY
Status: DISCONTINUED | OUTPATIENT
Start: 2024-11-30 | End: 2024-12-06 | Stop reason: HOSPADM

## 2024-11-30 RX ORDER — METOPROLOL TARTRATE 1 MG/ML
5 INJECTION, SOLUTION INTRAVENOUS ONCE
Status: COMPLETED | OUTPATIENT
Start: 2024-11-30 | End: 2024-11-30

## 2024-11-30 RX ORDER — 0.9 % SODIUM CHLORIDE 0.9 %
1500 INTRAVENOUS SOLUTION INTRAVENOUS ONCE
Status: COMPLETED | OUTPATIENT
Start: 2024-11-30 | End: 2024-11-30

## 2024-11-30 RX ORDER — POTASSIUM CHLORIDE 7.45 MG/ML
10 INJECTION INTRAVENOUS PRN
Status: DISCONTINUED | OUTPATIENT
Start: 2024-11-30 | End: 2024-12-06 | Stop reason: HOSPADM

## 2024-11-30 RX ORDER — DILTIAZEM HYDROCHLORIDE 5 MG/ML
10 INJECTION INTRAVENOUS ONCE
Status: COMPLETED | OUTPATIENT
Start: 2024-11-30 | End: 2024-11-30

## 2024-11-30 RX ORDER — ACETAMINOPHEN 650 MG/1
650 SUPPOSITORY RECTAL EVERY 6 HOURS PRN
Status: DISCONTINUED | OUTPATIENT
Start: 2024-11-30 | End: 2024-12-06 | Stop reason: HOSPADM

## 2024-11-30 RX ORDER — SODIUM CHLORIDE 0.9 % (FLUSH) 0.9 %
5-40 SYRINGE (ML) INJECTION EVERY 12 HOURS SCHEDULED
Status: DISCONTINUED | OUTPATIENT
Start: 2024-11-30 | End: 2024-12-06 | Stop reason: HOSPADM

## 2024-11-30 RX ORDER — FUROSEMIDE 20 MG/1
20 TABLET ORAL 2 TIMES DAILY
Status: DISCONTINUED | OUTPATIENT
Start: 2024-11-30 | End: 2024-12-06 | Stop reason: HOSPADM

## 2024-11-30 RX ORDER — ENOXAPARIN SODIUM 100 MG/ML
40 INJECTION SUBCUTANEOUS EVERY 24 HOURS
Status: DISCONTINUED | OUTPATIENT
Start: 2024-11-30 | End: 2024-11-30

## 2024-11-30 RX ORDER — AZITHROMYCIN 250 MG/1
500 TABLET, FILM COATED ORAL DAILY
Status: COMPLETED | OUTPATIENT
Start: 2024-11-30 | End: 2024-12-02

## 2024-11-30 RX ORDER — ALBUTEROL SULFATE 0.83 MG/ML
2.5 SOLUTION RESPIRATORY (INHALATION) EVERY 4 HOURS PRN
Status: DISCONTINUED | OUTPATIENT
Start: 2024-11-30 | End: 2024-12-06 | Stop reason: HOSPADM

## 2024-11-30 RX ADMIN — CEFEPIME 2000 MG: 2 INJECTION, POWDER, FOR SOLUTION INTRAVENOUS at 21:43

## 2024-11-30 RX ADMIN — SODIUM CHLORIDE 1500 ML: 9 INJECTION, SOLUTION INTRAVENOUS at 18:15

## 2024-11-30 RX ADMIN — SODIUM CHLORIDE 1000 ML: 9 INJECTION, SOLUTION INTRAVENOUS at 10:02

## 2024-11-30 RX ADMIN — SODIUM CHLORIDE: 9 INJECTION, SOLUTION INTRAVENOUS at 20:27

## 2024-11-30 RX ADMIN — DILTIAZEM HYDROCHLORIDE 10 MG: 5 INJECTION, SOLUTION INTRAVENOUS at 17:40

## 2024-11-30 RX ADMIN — METOPROLOL TARTRATE 5 MG: 5 INJECTION INTRAVENOUS at 16:28

## 2024-11-30 RX ADMIN — DILTIAZEM HYDROCHLORIDE 2.5 MG/HR: 5 INJECTION, SOLUTION INTRAVENOUS at 17:48

## 2024-11-30 RX ADMIN — WATER 2000 MG: 1 INJECTION INTRAMUSCULAR; INTRAVENOUS; SUBCUTANEOUS at 08:55

## 2024-11-30 RX ADMIN — ACETAMINOPHEN 650 MG: 325 TABLET ORAL at 14:10

## 2024-11-30 RX ADMIN — CEFEPIME 2000 MG: 2 INJECTION, POWDER, FOR SOLUTION INTRAVENOUS at 14:12

## 2024-11-30 RX ADMIN — AZITHROMYCIN DIHYDRATE 500 MG: 250 TABLET ORAL at 14:10

## 2024-11-30 RX ADMIN — ALBUTEROL SULFATE 2.5 MG: 2.5 SOLUTION RESPIRATORY (INHALATION) at 15:52

## 2024-11-30 RX ADMIN — SODIUM CHLORIDE: 9 INJECTION, SOLUTION INTRAVENOUS at 13:23

## 2024-11-30 RX ADMIN — ENOXAPARIN SODIUM 40 MG: 100 INJECTION SUBCUTANEOUS at 13:23

## 2024-11-30 RX ADMIN — SIROLIMUS 1 MG: 1 TABLET ORAL at 14:11

## 2024-11-30 ASSESSMENT — PAIN SCALES - GENERAL: PAINLEVEL_OUTOF10: 6

## 2024-11-30 ASSESSMENT — PAIN DESCRIPTION - PAIN TYPE: TYPE: CHRONIC PAIN

## 2024-11-30 ASSESSMENT — PAIN - FUNCTIONAL ASSESSMENT: PAIN_FUNCTIONAL_ASSESSMENT: 0-10

## 2024-11-30 ASSESSMENT — PAIN DESCRIPTION - LOCATION: LOCATION: BACK

## 2024-11-30 NOTE — RT PROTOCOL NOTE
RT Inhaler-Nebulizer Bronchodilator Protocol Note    There is a bronchodilator order in the chart from a provider indicating to follow the RT Bronchodilator Protocol and there is an “Initiate RT Inhaler-Nebulizer Bronchodilator Protocol” order as well (see protocol at bottom of note).    CXR Findings:  No results found.    The findings from the last RT Protocol Assessment were as follows:   History Pulmonary Disease: None or smoker <15 pack years  Respiratory Pattern: Regular pattern and RR 12-20 bpm  Breath Sounds: Slightly diminished and/or crackles  Cough: Strong, spontaneous, non-productive  Indication for Bronchodilator Therapy: None  Bronchodilator Assessment Score: 2    Aerosolized bronchodilator medication orders have been revised according to the RT Inhaler-Nebulizer Bronchodilator Protocol below.    Respiratory Therapist to perform RT Therapy Protocol Assessment initially then follow the protocol.  Repeat RT Therapy Protocol Assessment PRN for score 0-3 or on second treatment, BID, and PRN for scores above 3.    No Indications - adjust the frequency to every 6 hours PRN wheezing or bronchospasm, if no treatments needed after 48 hours then discontinue using Per Protocol order mode.     If indication present, adjust the RT bronchodilator orders based on the Bronchodilator Assessment Score as indicated below.  Use Inhaler orders unless patient has one or more of the following: on home nebulizer, not able to hold breath for 10 seconds, is not alert and oriented, cannot activate and use MDI correctly, or respiratory rate 25 breaths per minute or more, then use the equivalent nebulizer order(s) with same Frequency and PRN reasons based on the score.  If a patient is on this medication at home then do not decrease Frequency below that used at home.    0-3 - enter or revise RT bronchodilator order(s) to equivalent RT Bronchodilator order with Frequency of every 4 hours PRN for wheezing or increased work of  breathing using Per Protocol order mode.        4-6 - enter or revise RT Bronchodilator order(s) to two equivalent RT bronchodilator orders with one order with BID Frequency and one order with Frequency of every 4 hours PRN wheezing or increased work of breathing using Per Protocol order mode.        7-10 - enter or revise RT Bronchodilator order(s) to two equivalent RT bronchodilator orders with one order with TID Frequency and one order with Frequency of every 4 hours PRN wheezing or increased work of breathing using Per Protocol order mode.       11-13 - enter or revise RT Bronchodilator order(s) to one equivalent RT bronchodilator order with QID Frequency and an Albuterol order with Frequency of every 4 hours PRN wheezing or increased work of breathing using Per Protocol order mode.      Greater than 13 - enter or revise RT Bronchodilator order(s) to one equivalent RT bronchodilator order with every 4 hours Frequency and an Albuterol order with Frequency of every 2 hours PRN wheezing or increased work of breathing using Per Protocol order mode.     RT to enter RT Home Evaluation for COPD & MDI Assessment order using Per Protocol order mode.    Electronically signed by Esperanza Caro RCP on 11/30/2024 at 2:41 PM

## 2024-11-30 NOTE — ED PROVIDER NOTES
[facility] EMERGENCY DEPT      EMERGENCY MEDICINE     Pt Name: Ousmane Lockett  MRN: [id number]  Birthdate 1965  Date of evaluation: 11/30/2024  Provider: Radu Jamil DO  Supervising Physician: Remy Gonzalez MD    CHIEF COMPLAINT       Chief Complaint   Patient presents with    Malaise    Fever     HISTORY OF PRESENT ILLNESS   Ousmane Lockett is a 59 y.o. male with a history of metastatic lung cancer, CVA, HTN, HLD who presents to the emergency department from home for evaluation of fever and malaise.  Patient diagnosed with non-small cell cancer earlier this year, currently has mets to the shoulder and questionable mets to the adrenal gland and spleen.  Patient has gone through 2 immunotherapy and the started chemotherapy just 3 days ago.  Patient had temperature 100.5 last night and 102.5 this morning.  Patient reports feeling very fatigued, with no appetite and diffuse bodyaches.  Patient denies any chest pain, abdominal pain, dyspnea, fever, rash.  Patient has a Pleurx catheter on the right, his wife drained it last night reports no change in the color, reports the color is yellow is draining easily. Pt f/u at the [facility].     PASTMEDICAL HISTORY     Past Medical History:   Diagnosis Date    CVA (cerebral vascular accident) (HCC)     with chemo - residual numbness in left hand    Hematoma     right leg s/p fall    Hx of blood clots     Arm    Hyperlipidemia     Hypertension     Non Hodgkin's lymphoma (HCC)     1995/96    NSCLC of right lung (HCC) 07/03/2024    Renal transplant recipient     [facility] transplant follows-[facility]       Patient Active Problem List   Diagnosis Code    Sprain of ligament of lumbosacral joint S33.9XXA    Sprain of neck S13.9XXA    Thoracic sprain and strain [id number]    CVA (cerebral vascular accident) (HCC) I63.9    History of renal transplant Z94.0    History of non-Hodgkin lymphoma Z85.72    Hyperlipidemia E78.5    Hypertension I10    Biliary colic K80.50     components:    Osmolality Calc 270.6 (*)     All other components within normal limits   CULTURE, BLOOD 1   CULTURE, BLOOD 2   SCAN OF BLOOD SMEAR   ANION GAP   GLOMERULAR FILTRATION RATE, ESTIMATED   URINALYSIS WITH REFLEX TO CULTURE       (Any cultures that may have been sent were not resulted at the time of this patient visit)    MEDICAL DECISION MAKING / ED COURSE:     1) Number and Complexity of Problems            Problem List This Visit:         Chief Complaint   Patient presents with    Malaise    Fever            Differential Diagnosis includes (but not limited to):  Sepsis, pneumonia, neutropenia, pleural effusion, pulmonary edema, cellulitis, UTI, meningitis, encephalitis            2)  Treatment and Disposition         ED Reassessment:  See ED course         Shared Decision-Making was performed and disposition discussed with the        Patient/Family and questions answered          Social determinants of health impacting treatment or disposition: On chemo         Code Status: Full      Summary of Patient Presentation:      Medical Decision Making  This is a very ill-appearing 59-year-old male with a history of non-small cell lung cancer who recently started chemo who presents with significant fatigue, poor appetite, fever.  Patient febrile and tachycardic on arrival.  Vitals otherwise within normal limits.  Patient does not have any signs of cellulitis or any skin infection.  He does have a Pleurx catheter taped on the right side of his chest wall.  Chest x-ray reveals a right sided infiltrate and some pleural effusion on the right side.  Patient started on cefepime and given 1 L IV fluids along with some p.o. hydration.    Amount and/or Complexity of Data Reviewed  Labs: ordered.  Radiology: ordered.  ECG/medicine tests: ordered.    Risk  Decision regarding hospitalization.    /  ED Course as of 11/30/24 0956   Sat Nov 30, 2024   0849 Patient with a history of lung cancer who received chemo just 2 days

## 2024-11-30 NOTE — ED TRIAGE NOTES
Pt to ED with a fever and fatigue since receiving chemotherapy on 11/27/24. States that he has no appetite. Wife states that the pt had a fever of 102. Pt afebrile during triage. Keeping his eyes closed. Just states that he is very tired. EKG done. Pt states that this was his first chemo tx. States that he was getting immunotherapy before. Pt was diagnosed with metastatic lung CA in March '24.  \"Carboplatin\"  \"Pemetrexed\"

## 2024-11-30 NOTE — PROCEDURES
PROCEDURE NOTE  Date: 11/30/2024   Name: Ousmane Lockett  YOB: 1965    Procedures EKG completed, given to

## 2024-11-30 NOTE — H&P
History & Physical  Internal Medicine Resident         Patient: Ousmane Lockett 59 y.o. male      : 1965  Date of Admission: 2024  Date of Service: Pt seen/examined on 24 and Admitted to Inpatient with expected LOS greater than two midnights due to medical therapy.       ASSESSMENT AND PLAN  Sepsis due to pneumonia  Patient presented due to fever at home of 102.4 °F.  Patient reports significantly increased fatigue.  On arrival, patient tachycardic with leukocytosis, WBC 14.6.  CXR shows infiltrate and effusion at the right lung base.  SIRS 3/4.  Patient given cefepime and 1 L NS bolus in the ED.  Patient immunocompromised due to undergoing chemotherapy and on medications for renal transplant.  -Continue cefepime  -Azithromycin  -1.5 L NS bolus, with NS at 100 mL/h  -Blood cultures x 2 pending  -MRSA by PCR pending  -Respiratory culture, pneumonia panel pending  -Strep pneumo antigen and Legionella antigen pending  -Acapella/I-S  -Patient has Pleurx drain, will obtain cell count with differential and culture of pleural fluid    New onset atrial fibrillation with RVR  During afternoon on day of admission, patient converted into A-fib with RVR with heart rate up to the 160s to 180s.  No prior history of A-fib, likely due to current sepsis.  Patient continued to convert between NSR and atrial fibrillation.  Patient given Lopressor 5 mg IV with improvement of heart rate into the 140s to 150s.  Decision made to start patient on diltiazem bolus and drip.  CV 2 due to prior history of CVA with prior chemotherapy.  Family stated they wanted to discuss anticoagulation before starting any anticoagulation.  -Continue diltiazem drip  -Echo ordered  -Will need further discussion with family for anticoagulation  -BP borderline, will need to evaluate risk and benefits before starting patient on amiodarone drip if unable to tolerate diltiazem, patient's chemotherapy does not interact with amiodarone,  (GLUCOSAMINE CHOND COMPLEX/MSM PO)   Yes No   Sig: Take  by mouth.   acyclovir (ZOVIRAX) 200 MG capsule   Yes No   Sig: Take by mouth as needed   furosemide (LASIX) 20 MG tablet   No No   Sig: Take 1 tablet by mouth daily   Patient taking differently: Take 1 tablet by mouth 2 times daily   oxyCODONE (ROXICODONE) 5 MG immediate release tablet   Yes No   Sig: Take 1 tablet by mouth every 6 hours as needed.   polycarbophil (FIBERCON) 625 MG tablet   Yes No   Sig: Take 1 tablet by mouth daily   predniSONE (DELTASONE) 10 MG tablet   No No   Sig: TAKE 1 TABLET DAILY   sirolimus (RAPAMUNE) 1 MG tablet   Yes No   Sig: Take 1 tablet by mouth daily      Facility-Administered Medications: None     Allergies:  Patient has no known allergies.    Past Medical, Family, Social, Surgical Hx      Diagnosis Date    CVA (cerebral vascular accident) (HCC)     with chemo - residual numbness in left hand    Hematoma     right leg s/p fall    Hx of blood clots     Arm    Hyperlipidemia     Hypertension     Non Hodgkin's lymphoma (HCC)         NSCLC of right lung (HCC) 2024    Renal transplant recipient     [facility] transplant follows-[facility]         Procedure Laterality Date    APPENDECTOMY          BONE MARROW TRANSPLANT      [facility] transplant once a year    CHOLECYSTECTOMY, LAPAROSCOPIC N/A 2021    CHOLECYSTECTOMY LAPAROSCOPIC performed by Abdi Carmona MD at [facility] OR    COLONOSCOPY      FACIAL SURGERY N/A 10/28/2020    WIDER EXCISION MELANOMA VERTEX OF SCALP performed by Tera Perez MD at [facility] OR    KIDNEY TRANSPLANT  2000    LOBECTOMY Right 2024    @ [facility]    OTHER SURGICAL HISTORY      CAPD catheter insertion    OTHER SURGICAL HISTORY  2000    CAPD cath removal-Dr Villeda    SHOULDER SURGERY Right 2024    Reverse shoulder repair - at [facility]         Problem Relation Age of Onset    Cancer Mother 63        colon - is now      Heart Failure Father         heart

## 2024-11-30 NOTE — ED NOTES
Pt transported to [facility] on cart in stable condition. Floor contacted before transport and spoke with Sheldon.

## 2024-11-30 NOTE — ED PROVIDER NOTES
[facility]  EMERGENCY MEDICINE ATTENDING ATTESTATION      Evaluation of Ousmane Lockett.   Case discussed and care plan developed with resident physician.   I agree with the resident physician documentation and plan as documented by him, except if my documentation differs.   Patient seen, interviewed and examined by me.  I reviewed the medical, surgical, family and social history, medications and allergies.   I have reviewed and interpreted all available lab, radiology and ekg results available at the moment.  I have reviewed the nursing documentation.     Please see the resident physician completed note for final disposition except as documented on this attestation.   I have reviewed and interpreted all available lab, radiology and ekg results available at the moment.  Diagnosis, treatment and disposition plans were discussed and agreed upon by patient.   This transcription was electronically signed. It was dictated by use of voice recognition software and electronically transcribed. The transcription may contain errors not detected in proofreading.     I performed direct supervision and was present for the critical portion following procedures: None  Critical care time on this case: None    Electronically signed by Remy Gonzalez MD on 11/30/24 at 9:15 AM Remy Oneill MD  11/30/24 0915       Remy Gonzalez MD  11/30/24 0996

## 2024-11-30 NOTE — PROGRESS NOTES
Pt admitted to  5K16 via from ED from ED.  Complaints: Shortness of breath.    IV normal saline infusing into the antecubital left, condition patent and no redness at a rate of 100 mls/ hour with about 1,000 mls in the bag still. IV site free of s/s of infection or infiltration. Vital signs obtained. Assessment and data collection initiated. Oriented to room. Policies and procedures for 5 explained. All questions answered with no further questions at this time. Fall prevention and safety brochure discussed with patient.  Bed alarm on. Call light in reach.Oriented to room.   Kisha Monroy, RN, RN 11/30/2024 1:34 PM     Explained patients right to have family, representative or physician notified of their admission.  Patient has Declined for physician to be notified.  Patient has or family/representative to be notified.

## 2024-11-30 NOTE — PROGRESS NOTES
1606: Patient's HR sustaining 160's on telemetry. Resident notified of HR, STAT EKG ordered.     1616: Provider notified of patient's HR still sustaining 160's - 180's. Dr. Alfaro to bedside to assess patient. STAT EKG completed.    1628: 5mg IV lopressor given to patient at this time. Provider remains at bedside.    1636: Transfer order to stepdown placed. Awaiting bed placement.

## 2024-11-30 NOTE — PLAN OF CARE
Problem: Respiratory - Adult  Goal: Clear lung sounds  Outcome: Progressing  Note: Txs to help improve lung aeration. Patient mutually agreed on goals.

## 2024-11-30 NOTE — ED NOTES
ED to inpatient nurses report      Chief Complaint:  Chief Complaint   Patient presents with    Malaise    Fever     Present to ED from: home    MOA:     LOC: alert and orientated to name, place, date  Mobility: Independent  Oxygen Baseline: 98%    Current needs required: RA     Code Status:   Prior    What abnormal results were found and what did you give/do to treat them? Elevated lactic and wbc  Any procedures or intervention occur? See MAR    Mental Status:  Level of Consciousness: Alert (0)    Psych Assessment:        Vitals:  Patient Vitals for the past 24 hrs:   BP Temp Temp src Pulse Resp SpO2 Weight   11/30/24 0954 (!) 130/56 -- -- (!) 114 24 98 % --   11/30/24 0839 -- (!) 102.2 °F (39 °C) Axillary -- -- -- --   11/30/24 0832 (!) 140/57 98.8 °F (37.1 °C) -- (!) 116 16 97 % 91.6 kg (202 lb)        LDAs:   Peripheral IV 11/30/24 Left Antecubital (Active)   Site Assessment Clean, dry & intact 11/30/24 0835   Line Status Blood return noted;Flushed;Specimen collected 11/30/24 0835   Phlebitis Assessment No symptoms 11/30/24 0835   Infiltration Assessment 0 11/30/24 0835   Dressing Status Clean, dry & intact 11/30/24 0835       Ambulatory Status:  No data recorded    Diagnosis:  DISPOSITION Admitted 11/30/2024 10:20:12 AM   Final diagnoses:   Sepsis without acute organ dysfunction, due to unspecified organism (HCC)   Pneumonia of right lower lobe due to infectious organism   History of lung cancer        Consults:  None     Pain Score:  Pain Assessment  Pain Assessment: 0-10  Pain Level: 6  Pain Location: Back  Pain Type: Chronic pain    C-SSRS:   Risk of Suicide: No Risk    Sepsis Screening:       Lane Fall Risk:       Swallow Screening        Preferred Language:   English      ALLERGIES     Patient has no known allergies.    SURGICAL HISTORY       Past Surgical History:   Procedure Laterality Date    APPENDECTOMY      1972    BONE MARROW TRANSPLANT  1996    [facility] transplant once a year    CHOLECYSTECTOMY,

## 2024-12-01 PROBLEM — I48.91 ATRIAL FIBRILLATION (HCC): Status: ACTIVE | Noted: 2024-12-01

## 2024-12-01 PROBLEM — A41.9 SEPSIS WITHOUT ACUTE ORGAN DYSFUNCTION (HCC): Status: ACTIVE | Noted: 2024-12-01

## 2024-12-01 LAB
ACB COMPLEX DNA BLD POS QL NAA+NON-PROBE: NOT DETECTED
ANION GAP SERPL CALC-SCNC: 9 MEQ/L (ref 8–16)
B FRAGILIS DNA BLD POS QL NAA+NON-PROBE: NOT DETECTED
BLACTX-M ISLT/SPM QL: ABNORMAL
BLAIMP ISLT/SPM QL: ABNORMAL
BLAKPC ISLT/SPM QL: ABNORMAL
BLAOXA-48-LIKE ISLT/SPM QL: ABNORMAL
BLAVIM ISLT/SPM QL: ABNORMAL
BOTTLE TYPE: ABNORMAL
BUN SERPL-MCNC: 16 MG/DL (ref 7–22)
C ALBICANS DNA BLD POS QL NAA+NON-PROBE: NOT DETECTED
C AURIS DNA BLD POS QL NAA+NON-PROBE: NOT DETECTED
C GATTII+NEOFOR DNA BLD POS QL NAA+N-PRB: NOT DETECTED
C GLABRATA DNA BLD POS QL NAA+NON-PROBE: NOT DETECTED
C KRUSEI DNA BLD POS QL NAA+NON-PROBE: NOT DETECTED
C PARAP DNA BLD POS QL NAA+NON-PROBE: NOT DETECTED
C TROPICLS DNA BLD POS QL NAA+NON-PROBE: NOT DETECTED
CALCIUM SERPL-MCNC: 7.7 MG/DL (ref 8.5–10.5)
CHARACTER, BODY FLUID: CLEAR
CHLORIDE SERPL-SCNC: 103 MEQ/L (ref 98–111)
CO2 SERPL-SCNC: 20 MEQ/L (ref 23–33)
COAG NEG STAPH DNA BLD QL NAA+PROBE: DETECTED
COLISTIN RES MCR-1 ISLT/SPM QL: ABNORMAL
COLOR FLD: YELLOW
CREAT SERPL-MCNC: 0.5 MG/DL (ref 0.4–1.2)
DEPRECATED RDW RBC AUTO: 46.4 FL (ref 35–45)
E CLOAC COMP DNA BLD POS NAA+NON-PROBE: NOT DETECTED
E COLI DNA BLD POS QL NAA+NON-PROBE: NOT DETECTED
E FAECALIS DNA BLD POS QL NAA+NON-PROBE: NOT DETECTED
E FAECIUM DNA BLD POS QL NAA+NON-PROBE: NOT DETECTED
EKG ATRIAL RATE: 116 BPM
EKG P AXIS: 41 DEGREES
EKG P-R INTERVAL: 134 MS
EKG Q-T INTERVAL: 314 MS
EKG Q-T INTERVAL: 320 MS
EKG QRS DURATION: 78 MS
EKG QRS DURATION: 94 MS
EKG QTC CALCULATION (BAZETT): 436 MS
EKG QTC CALCULATION (BAZETT): 480 MS
EKG R AXIS: -35 DEGREES
EKG R AXIS: -43 DEGREES
EKG T AXIS: -7 DEGREES
EKG T AXIS: 48 DEGREES
EKG VENTRICULAR RATE: 116 BPM
EKG VENTRICULAR RATE: 135 BPM
ENTEROBACTERALES DNA BLD POS NAA+N-PRB: NOT DETECTED
ERYTHROCYTE [DISTWIDTH] IN BLOOD BY AUTOMATED COUNT: 17.6 % (ref 11.5–14.5)
GFR SERPL CREATININE-BSD FRML MDRD: > 90 ML/MIN/1.73M2
GLUCOSE SERPL-MCNC: 107 MG/DL (ref 70–108)
GP B STREP DNA SPEC QL NAA+PROBE: NOT DETECTED
GP B STREP DNA SPEC QL NAA+PROBE: NOT DETECTED
GRANULOCYTES NFR FLD AUTO: 0 %
HAEM INFLU DNA BLD POS QL NAA+NON-PROBE: NOT DETECTED
HCT VFR BLD AUTO: 26 % (ref 42–52)
HGB BLD-MCNC: 7.5 GM/DL (ref 14–18)
K OXYTOCA DNA BLD POS QL NAA+NON-PROBE: NOT DETECTED
K OXYTOCA DNA BLD POS QL NAA+NON-PROBE: NOT DETECTED
KLEBSIELLA SP DNA BLD POS QL NAA+NON-PRB: NOT DETECTED
L MONOCYTOG DNA BLD POS QL NAA+NON-PROBE: NOT DETECTED
LYMPHOCYTES NFR FLD MANUAL: 17 %
MCH RBC QN AUTO: 21.4 PG (ref 26–33)
MCHC RBC AUTO-ENTMCNC: 28.8 GM/DL (ref 32.2–35.5)
MCV RBC AUTO: 74.3 FL (ref 80–94)
MECA ISLT/SPM QL: DETECTED
MECA+MECC+MREJ ISLT/SPM QL: ABNORMAL
MONOCYTES NFR FLD MANUAL: 6 %
MONONUC CELLS NFR FLD AUTO: 0 %
MRSA DNA SPEC QL NAA+PROBE: NEGATIVE
N MEN DNA BLD POS QL NAA+NON-PROBE: NOT DETECTED
NDM: ABNORMAL
NEUTS SEG NFR FLD MANUAL: 77 %
NUC CELL # FLD AUTO: 659 /CUMM (ref 0–500)
P AERUGINOSA DNA BLD POS NAA+NON-PROBE: NOT DETECTED
PATHOLOGIST REVIEW: ABNORMAL
PLATELET # BLD AUTO: 276 THOU/MM3 (ref 130–400)
PMV BLD AUTO: 9.3 FL (ref 9.4–12.4)
POTASSIUM SERPL-SCNC: 4 MEQ/L (ref 3.5–5.2)
PROTEUS SPP: NOT DETECTED
RBC # BLD AUTO: 3.5 MILL/MM3 (ref 4.7–6.1)
RBC # FLD AUTO: < 2000 /CUMM
S AUREUS DNA BLD POS QL NAA+NON-PROBE: NOT DETECTED
S EPIDERMIDIS DNA BLD POS QL NAA+NON-PRB: DETECTED
S LUGDUNENSIS DNA BLD POS QL NAA+NON-PRB: NOT DETECTED
S MALTOPHILIA DNA BLD POS QL NAA+NON-PRB: NOT DETECTED
S MARCESCENS DNA BLD POS NAA+NON-PROBE: NOT DETECTED
S PYO DNA THROAT QL NAA+PROBE: NOT DETECTED
SALMONELLA DNA BLD POS QL NAA+NON-PROBE: NOT DETECTED
SODIUM SERPL-SCNC: 132 MEQ/L (ref 135–145)
SOURCE OF BLOOD CULTURE: ABNORMAL
SPECIMEN: ABNORMAL
STREPTOCOCCUS DNA BLD QL NAA+PROBE: NOT DETECTED
TOTAL VOLUME RECEIVED BODY FLUID: 9 ML
VANA+VANB ISLT/SPM QL: ABNORMAL
WBC # BLD AUTO: 10.2 THOU/MM3 (ref 4.8–10.8)

## 2024-12-01 PROCEDURE — 2580000003 HC RX 258: Performed by: INTERNAL MEDICINE

## 2024-12-01 PROCEDURE — 6370000000 HC RX 637 (ALT 250 FOR IP): Performed by: OPHTHALMOLOGY

## 2024-12-01 PROCEDURE — 36415 COLL VENOUS BLD VENIPUNCTURE: CPT

## 2024-12-01 PROCEDURE — 2580000003 HC RX 258

## 2024-12-01 PROCEDURE — 87899 AGENT NOS ASSAY W/OPTIC: CPT

## 2024-12-01 PROCEDURE — 99232 SBSQ HOSP IP/OBS MODERATE 35: CPT | Performed by: OPHTHALMOLOGY

## 2024-12-01 PROCEDURE — 80195 ASSAY OF SIROLIMUS: CPT

## 2024-12-01 PROCEDURE — 87449 NOS EACH ORGANISM AG IA: CPT

## 2024-12-01 PROCEDURE — 6360000002 HC RX W HCPCS

## 2024-12-01 PROCEDURE — 80048 BASIC METABOLIC PNL TOTAL CA: CPT

## 2024-12-01 PROCEDURE — 51701 INSERT BLADDER CATHETER: CPT

## 2024-12-01 PROCEDURE — 85027 COMPLETE CBC AUTOMATED: CPT

## 2024-12-01 PROCEDURE — 51798 US URINE CAPACITY MEASURE: CPT

## 2024-12-01 PROCEDURE — 93010 ELECTROCARDIOGRAM REPORT: CPT | Performed by: INTERNAL MEDICINE

## 2024-12-01 PROCEDURE — 6370000000 HC RX 637 (ALT 250 FOR IP)

## 2024-12-01 PROCEDURE — 99223 1ST HOSP IP/OBS HIGH 75: CPT | Performed by: INTERNAL MEDICINE

## 2024-12-01 PROCEDURE — 2140000000 HC CCU INTERMEDIATE R&B

## 2024-12-01 RX ORDER — PREDNISONE 20 MG/1
10 TABLET ORAL DAILY
Status: DISCONTINUED | OUTPATIENT
Start: 2024-12-01 | End: 2024-12-06 | Stop reason: HOSPADM

## 2024-12-01 RX ORDER — METOPROLOL TARTRATE 25 MG/1
12.5 TABLET, FILM COATED ORAL 2 TIMES DAILY
Status: DISCONTINUED | OUTPATIENT
Start: 2024-12-01 | End: 2024-12-02

## 2024-12-01 RX ORDER — METOPROLOL TARTRATE 25 MG/1
12.5 TABLET, FILM COATED ORAL 2 TIMES DAILY
Status: DISCONTINUED | OUTPATIENT
Start: 2024-12-01 | End: 2024-12-01

## 2024-12-01 RX ADMIN — CEFEPIME 2000 MG: 2 INJECTION, POWDER, FOR SOLUTION INTRAVENOUS at 21:48

## 2024-12-01 RX ADMIN — OXYCODONE HYDROCHLORIDE 5 MG: 5 TABLET ORAL at 14:04

## 2024-12-01 RX ADMIN — ACETAMINOPHEN 650 MG: 325 TABLET ORAL at 14:05

## 2024-12-01 RX ADMIN — PREDNISONE 10 MG: 20 TABLET ORAL at 11:01

## 2024-12-01 RX ADMIN — ACETAMINOPHEN 650 MG: 325 TABLET ORAL at 20:43

## 2024-12-01 RX ADMIN — AZITHROMYCIN DIHYDRATE 500 MG: 250 TABLET ORAL at 08:05

## 2024-12-01 RX ADMIN — SODIUM CHLORIDE: 9 INJECTION, SOLUTION INTRAVENOUS at 06:30

## 2024-12-01 RX ADMIN — OXYCODONE HYDROCHLORIDE 5 MG: 5 TABLET ORAL at 08:05

## 2024-12-01 RX ADMIN — OXYCODONE HYDROCHLORIDE 5 MG: 5 TABLET ORAL at 02:00

## 2024-12-01 RX ADMIN — CEFEPIME 2000 MG: 2 INJECTION, POWDER, FOR SOLUTION INTRAVENOUS at 13:41

## 2024-12-01 RX ADMIN — METOPROLOL TARTRATE 12.5 MG: 25 TABLET, FILM COATED ORAL at 20:44

## 2024-12-01 RX ADMIN — METOPROLOL TARTRATE 12.5 MG: 25 TABLET, FILM COATED ORAL at 11:00

## 2024-12-01 RX ADMIN — CEFEPIME 2000 MG: 2 INJECTION, POWDER, FOR SOLUTION INTRAVENOUS at 06:33

## 2024-12-01 RX ADMIN — SODIUM CHLORIDE, PRESERVATIVE FREE 10 ML: 5 INJECTION INTRAVENOUS at 20:45

## 2024-12-01 RX ADMIN — SIROLIMUS 1 MG: 1 TABLET ORAL at 08:05

## 2024-12-01 ASSESSMENT — PAIN DESCRIPTION - ORIENTATION
ORIENTATION: LOWER
ORIENTATION: LOWER
ORIENTATION: RIGHT

## 2024-12-01 ASSESSMENT — PAIN DESCRIPTION - LOCATION
LOCATION: GENERALIZED;ARM
LOCATION: BACK
LOCATION: BACK

## 2024-12-01 ASSESSMENT — PAIN DESCRIPTION - DESCRIPTORS
DESCRIPTORS: SORE;ACHING
DESCRIPTORS: SHARP
DESCRIPTORS: ACHING

## 2024-12-01 ASSESSMENT — PAIN DESCRIPTION - FREQUENCY
FREQUENCY: CONTINUOUS

## 2024-12-01 ASSESSMENT — PAIN SCALES - GENERAL
PAINLEVEL_OUTOF10: 5
PAINLEVEL_OUTOF10: 3
PAINLEVEL_OUTOF10: 4
PAINLEVEL_OUTOF10: 4
PAINLEVEL_OUTOF10: 0
PAINLEVEL_OUTOF10: 2

## 2024-12-01 ASSESSMENT — PAIN - FUNCTIONAL ASSESSMENT
PAIN_FUNCTIONAL_ASSESSMENT: ACTIVITIES ARE NOT PREVENTED
PAIN_FUNCTIONAL_ASSESSMENT: ACTIVITIES ARE NOT PREVENTED
PAIN_FUNCTIONAL_ASSESSMENT: PREVENTS OR INTERFERES SOME ACTIVE ACTIVITIES AND ADLS

## 2024-12-01 ASSESSMENT — PAIN DESCRIPTION - PAIN TYPE
TYPE: CHRONIC PAIN

## 2024-12-01 ASSESSMENT — PAIN DESCRIPTION - ONSET
ONSET: ON-GOING

## 2024-12-01 NOTE — CONSULTS
[facility]'s  Consult    Patient's Name/Date of Birth: Ousmane Lockett / 1965 (59 y.o.)    Date: December 1, 2024     Referring Provider: Terri Regalado MD    CHIEF COMPLAINT:  Afib RVR      HPI: This is a pleasant 59 y.o. male presents with hx of NSCLC on active chemo/immunotherapy, hx of renal transplant 2000, sepsis due to PNA who presents with PNA management and now has Afib RVR with -170s.  He has had NSR at times.  Was given BB IV.  Was started on Diltiazem gtt.  Has hx of CVA int he past.  Family discussing OAC.  TTE pending.  No chest pain.  RLL infiltrate on CXR.  No prior cardiac issues.   2021 - stress negative for ischemia.   No chest pain, angina, KLEIN, orthopnea, PND, sob at rest, palpitations, LE edema, or syncope.        All labs, EKG's, diagnostic testing and images as well as cardiac cath, stress testing were reviewed during this encounter    Past Medical History:   Diagnosis Date    CVA (cerebral vascular accident) (HCC)     with chemo - residual numbness in left hand    Hematoma     right leg s/p fall    Hx of blood clots     Arm    Hyperlipidemia     Hypertension     Non Hodgkin's lymphoma (HCC)     1995/96    NSCLC of right lung (HCC) 07/03/2024    Renal transplant recipient     [facility] transplant follows-Demetri     Past Surgical History:   Procedure Laterality Date    APPENDECTOMY      1972    BONE MARROW TRANSPLANT  1996    [facility] transplant once a year    CHOLECYSTECTOMY, LAPAROSCOPIC N/A 11/19/2021    CHOLECYSTECTOMY LAPAROSCOPIC performed by Abdi Carmona MD at [facility] OR    COLONOSCOPY      FACIAL SURGERY N/A 10/28/2020    WIDER EXCISION MELANOMA VERTEX OF SCALP performed by Tera Perez MD at [facility] OR    KIDNEY TRANSPLANT  06/06/2000    LOBECTOMY Right 07/03/2024    @ [facility]    OTHER SURGICAL HISTORY      CAPD catheter insertion    OTHER SURGICAL HISTORY  06/09/2000    CAPD cath removal-Dr Villeda    SHOULDER SURGERY Right  Not on file   Social Connections: Not on file   Intimate Partner Violence: Not on file   Housing Stability: Low Risk  (10/9/2024)    Received from [facility]    Housing Stability Vital Sign     Unable to Pay for Housing in the Last Year: No     Number of Times Moved in the Last Year: 1     Homeless in the Last Year: No     ROS:   Constitutional: Denies any recent wt change.  Eyes:  Denies any blurring or double vision, no glaucoma  Ears/Nose/Mouth/Throat:  Denies any chronic sinus/rhinitis, bleeding gums  Cardiovascular:  As described above.    Respiratory:  Denies any frequent cough, wheezing or coughing up blood  Genitourinary:  Denies difficulty with urination and kidney stones  Gastrointestinal:  Denies any chronic problems with abdominal pain, nausea, vomiting or diarrhea  Musculoskeletal:  Denies any joint pain, back pain, or difficulty walking  Integumentary:  Denies any rash  Neurological:  No numbness or tingling  Endocrine:  Denies any polydipsia.    Hematologic/Lymphatic:  Denies any hemorrhage or lymphatic drainage problems.  Labs:  CBC:   Recent Labs     11/30/24  0840 12/01/24  0537   WBC 14.6* 10.2   HGB 9.5* 7.5*   HCT 33.6* 26.0*   MCV 74.2* 74.3*    276     BMP:   Recent Labs     11/30/24  0840 12/01/24  0537   * 132*   K 4.0 4.0   CL 95* 103   CO2 25 20*   BUN 20 16   CREATININE 0.7 0.5   MG 1.9  --      Accucheck Glucoses: No results for input(s): \"POCGLU\" in the last 72 hours.  Cardiac Enzymes: No results for input(s): \"CKTOTAL\", \"CKMB\", \"CKMBINDEX\", \"TROPONINI\" in the last 72 hours.  PT/INR: No results for input(s): \"PROTIME\", \"INR\" in the last 72 hours.  APTT: No results for input(s): \"APTT\" in the last 72 hours.  Liver Profile:  Lab Results   Component Value Date/Time    AST 22 09/29/2024 11:35 AM    ALT 31 09/29/2024 11:35 AM    BILIDIR <0.1 09/29/2024 11:35 AM    BILITOT 0.2 09/29/2024 11:35 AM    ALKPHOS 110 09/29/2024 11:35 AM    PROTEIN 5.7  improve Afib burden  Would do 30 day event monitor to clarify burden as he has had a CVA and would need OAC if burden is high  Gentle hydration  Further recommendations based on results and clinical course    Thank you for allowing us to participate in the care of this patient.  Please do not hesitate to call us with questions.    Time spent reviewing notes, data, discussing with patient/family, and formulating plan with clinical documentation was approximately 80 minutes.     Electronically signed by Clint Koehler MD on 12/1/2024 at 2:51 PM    Interventional Cardiology - [facility]

## 2024-12-01 NOTE — PLAN OF CARE
Problem: Chronic Conditions and Co-morbidities  Goal: Patient's chronic conditions and co-morbidity symptoms are monitored and maintained or improved  Outcome: Progressing  Flowsheets  Taken 12/1/2024 1743  Care Plan - Patient's Chronic Conditions and Co-Morbidity Symptoms are Monitored and Maintained or Improved: Monitor and assess patient's chronic conditions and comorbid symptoms for stability, deterioration, or improvement  Taken 12/1/2024 0800  Care Plan - Patient's Chronic Conditions and Co-Morbidity Symptoms are Monitored and Maintained or Improved: Monitor and assess patient's chronic conditions and comorbid symptoms for stability, deterioration, or improvement     Problem: Pain  Goal: Verbalizes/displays adequate comfort level or baseline comfort level  Outcome: Progressing  Flowsheets (Taken 12/1/2024 1743)  Verbalizes/displays adequate comfort level or baseline comfort level:   Assess pain using appropriate pain scale   Encourage patient to monitor pain and request assistance     Problem: Skin/Tissue Integrity  Goal: Absence of new skin breakdown  Description: 1.  Monitor for areas of redness and/or skin breakdown  2.  Assess vascular access sites hourly  3.  Every 4-6 hours minimum:  Change oxygen saturation probe site  4.  Every 4-6 hours:  If on nasal continuous positive airway pressure, respiratory therapy assess nares and determine need for appliance change or resting period.  Outcome: Progressing  Note: Ongoing assessment & interventions provided throughout shift.  Skin assessments provided.  Encouraging/assisting patient to turn as needed.      Problem: ABCDS Injury Assessment  Goal: Absence of physical injury  Outcome: Progressing  Flowsheets (Taken 12/1/2024 1743)  Absence of Physical Injury: Implement safety measures based on patient assessment     Problem: Safety - Adult  Goal: Free from fall injury  Outcome: Progressing  Flowsheets (Taken 12/1/2024 1743)  Free From Fall Injury: Instruct  family/caregiver on patient safety     Problem: Respiratory - Adult  Goal: Clear lung sounds  Outcome: Progressing  Note: Cough and deep breathing     Problem: Discharge Planning  Goal: Discharge to home or other facility with appropriate resources  Outcome: Progressing  Flowsheets  Taken 12/1/2024 1743  Discharge to home or other facility with appropriate resources: Identify barriers to discharge with patient and caregiver  Taken 12/1/2024 0800  Discharge to home or other facility with appropriate resources: Identify barriers to discharge with patient and caregiver

## 2024-12-01 NOTE — PROGRESS NOTES
Hospitalist Progress Note    Patient:  Ousmane Lockett      Unit/Bed:3B-38/038-A    YOB: 1965    MRN: [id number]       Acct: [id number]     PCP: Daniel Barbosa MD    Date of Admission: 11/30/2024      ASSESSMENT AND PLAN  Sepsis due to pneumonia  Patient presented due to fever at home of 102.4 °F.  Patient reports significantly increased fatigue.  On arrival, patient tachycardic with leukocytosis, WBC 14.6.  CXR shows infiltrate and effusion at the right lung base.  SIRS 3/4.  Patient given cefepime and 1 L NS bolus in the ED.  Patient immunocompromised due to undergoing chemotherapy and on medications for renal transplant.  -Continue cefepime  -Azithromycin  -1.5 L NS bolus, with NS at 100 mL/h: reduce to 50 ml/h sec t edema  -Blood cultures x 2 pending  -MRSA by PCR -ve  -Respiratory culture, pneumonia panel pending  -Strep pneumo antigen and Legionella antigen pending  -Acapella/I-S  -Patient has Pleurx drain, will obtain cell count with differential and culture of pleural fluid     New onset atrial fibrillation with RVR  During afternoon on day of admission, patient converted into A-fib with RVR with heart rate up to the 160s to 180s.  No prior history of A-fib, likely due to current sepsis.  Patient continued to convert between NSR and atrial fibrillation.  Patient given Lopressor 5 mg IV with improvement of heart rate into the 140s to 150s.  Decision made to start patient on diltiazem bolus and drip.  CV 2 due to prior history of CVA with prior chemotherapy.  Family stated they wanted to discuss anticoagulation before starting any anticoagulation.  -on diltiazem drip: will stop and resume lopressor with parameters  -Echo ordered  -Will need further discussion with family for anticoagulation  -consult cardiology     NSCLC  Follows with the [facility] at [facility] for oncology.  Diagnosed earlier this year.  Patient has received 2 doses of immunotherapy.  Patient was switched to  chemotherapy instead of immunotherapy.  First dose of chemotherapy was on Wednesday of this week, 11/27.  Current chemotherapy includes carboplatin and pemetrexed.     Hx of renal transplant  In 2000 at [facility]. Currently on Sirolimus and prednisone 10 mg. Cr 0.7 on admission.  -Monitor BMP  -Continue Sirolimus  -Sirolimus level ordered  -Resume pred: monitor for need of stress dose  -Hold home Lasix due to current sepsis        Data reviewed (unless otherwise discussed in assessment/plan)  EKG:  I have reviewed the EKG with the following interpretation: Initial sinus tachycardia, repeat atrial fibrillation with RVR  Imaging: I have reviewed CXR with the following interpretation: Infiltrate and effusion at the right lung base  Labs: Reviewed, see chart and plan above.         =======================================================================     SUBJECTIVE     Chief Complaint: Fever     History Of Present Illness:  Ousmane Lockett is a 59 y.o. male with PMHx of NSCLC, Hx of renal transplant who presents to [facility] with fever after undergoing first dose of chemotherapy earlier this week.  Patient reports that he has been having a sore throat and had increased fatigue that began on Friday.  Patient did also have a low-grade fever on Friday at home approximately 29 °F.  Patient then had fever of 102.4 °F morning of admission.  Patient and his family called oncology at [facility] that recommended local ED presentation.  Patient denies a cough.  Patient denies any chest pains, palpitations, N/V/C/D.  Patient not requiring any supplemental oxygen and is on any supplemental oxygen at home.  Patient just overall feels fatigued and unwell.  Patient having both fevers and chills.  No increased sputum production.     ED course: Initial vitals include temperature 98.8°F however axillary repeat 102.2 °F, respiratory rate 16, heart rate 116, /57, SpO2 97% on room air.  Pertinent initial labs include WBC

## 2024-12-01 NOTE — PLAN OF CARE
Problem: Chronic Conditions and Co-morbidities  Goal: Patient's chronic conditions and co-morbidity symptoms are monitored and maintained or improved  Outcome: Progressing  Flowsheets (Taken 11/30/2024 1130 by Kisha Monroy, RN)  Care Plan - Patient's Chronic Conditions and Co-Morbidity Symptoms are Monitored and Maintained or Improved: Monitor and assess patient's chronic conditions and comorbid symptoms for stability, deterioration, or improvement     Problem: Pain  Goal: Verbalizes/displays adequate comfort level or baseline comfort level  Outcome: Progressing     Problem: Skin/Tissue Integrity  Goal: Absence of new skin breakdown  Description: 1.  Monitor for areas of redness and/or skin breakdown  2.  Assess vascular access sites hourly  3.  Every 4-6 hours minimum:  Change oxygen saturation probe site  4.  Every 4-6 hours:  If on nasal continuous positive airway pressure, respiratory therapy assess nares and determine need for appliance change or resting period.  Outcome: Progressing     Problem: ABCDS Injury Assessment  Goal: Absence of physical injury  Outcome: Progressing     Problem: Safety - Adult  Goal: Free from fall injury  Outcome: Progressing     Problem: Respiratory - Adult  Goal: Clear lung sounds  11/30/2024 2204 by José Miguel De La Rosa, RN  Outcome: Progressing  11/30/2024 1556 by Esperanza Caro RCP  Outcome: Progressing  Note: Txs to help improve lung aeration. Patient mutually agreed on goals.     Problem: Discharge Planning  Goal: Discharge to home or other facility with appropriate resources  Outcome: Progressing

## 2024-12-01 NOTE — CARE COORDINATION
12/01/24 0820   Service Assessment   Patient Orientation Alert and Oriented   Cognition Alert   History Provided By Patient   Primary Caregiver Self   Accompanied By/Relationship wife Monica   Support Systems Spouse/Significant Other   Patient's Healthcare Decision Maker is: Patient Declined (Legal Next of Kin Remains as Decision Maker)   PCP Verified by CM Yes   Last Visit to PCP Within last 6 months   Prior Functional Level Independent in ADLs/IADLs   Current Functional Level Independent in ADLs/IADLs   Can patient return to prior living arrangement Yes   Ability to make needs known: Good   Family able to assist with home care needs: Yes   Would you like for me to discuss the discharge plan with any other family members/significant others, and if so, who? Yes  (wife at bedside)   Financial Resources Other (Comment)  (BCBS)   Community Resources Other (Comment)  (OP PT)   CM/SW Referral ADLs/IADLs;DME   Social/Functional History   Lives With Spouse   Type of Home House   Active  Yes  (wife does most driving)   Discharge Planning   Type of Residence House   Living Arrangements Spouse/Significant Other   Current Services Prior To Admission Durable Medical Equipment;Other (Comment)  (OP PT)   Current DME Prior to Arrival Shower Chair;Other (Comment)  ([employer] supplies)   Potential Assistance Needed N/A   DME Ordered? No   Potential Assistance Purchasing Medications No   Type of Home Care Services None   Patient expects to be discharged to: House   Services At/After Discharge   Transition of Care Consult (CM Consult) Discharge Planning   Mode of Transport at Discharge Other (see comment)  (family)   Confirm Follow Up Transport Family   Condition of Participation: Discharge Planning   The Plan for Transition of Care is related to the following treatment goals: \"Just get feeling better\"     Patient Goals/Plan/Treatment Preferences: Met génesis/ Ousmane and his wife. Verified insurance and PCP. He is independent. He has

## 2024-12-02 ENCOUNTER — APPOINTMENT (OUTPATIENT)
Age: 59
End: 2024-12-02
Payer: COMMERCIAL

## 2024-12-02 ENCOUNTER — APPOINTMENT (OUTPATIENT)
Dept: GENERAL RADIOLOGY | Age: 59
End: 2024-12-02
Payer: COMMERCIAL

## 2024-12-02 PROBLEM — R78.81 BACTEREMIA: Status: ACTIVE | Noted: 2024-12-02

## 2024-12-02 PROBLEM — D50.9 MICROCYTIC ANEMIA: Status: ACTIVE | Noted: 2024-12-02

## 2024-12-02 LAB
ALBUMIN SERPL BCG-MCNC: 2.2 G/DL (ref 3.5–5.1)
ALP SERPL-CCNC: 105 U/L (ref 38–126)
ALT SERPL W/O P-5'-P-CCNC: 58 U/L (ref 11–66)
ANION GAP SERPL CALC-SCNC: 9 MEQ/L (ref 8–16)
AST SERPL-CCNC: 54 U/L (ref 5–40)
BACTERIA BLD AEROBE CULT: ABNORMAL
BACTERIA BLD AEROBE CULT: ABNORMAL
BILIRUB SERPL-MCNC: 0.3 MG/DL (ref 0.3–1.2)
BUN SERPL-MCNC: 15 MG/DL (ref 7–22)
CALCIUM SERPL-MCNC: 7.9 MG/DL (ref 8.5–10.5)
CHLORIDE SERPL-SCNC: 100 MEQ/L (ref 98–111)
CO2 SERPL-SCNC: 22 MEQ/L (ref 23–33)
CREAT SERPL-MCNC: 0.5 MG/DL (ref 0.4–1.2)
DEPRECATED RDW RBC AUTO: 46.2 FL (ref 35–45)
ECHO AV CUSP MM: 2.1 CM
ECHO LA DIAMETER INDEX: 1.67 CM/M2
ECHO LA DIAMETER: 3.5 CM
ECHO LV EJECTION FRACTION BIPLANE: 56 % (ref 55–100)
ECHO LV FRACTIONAL SHORTENING: 30 % (ref 28–44)
ECHO LV GLOBAL LONGITUDINAL STRAIN (GLS): -11.3 %
ECHO LV GLOBAL LONGITUDINAL STRAIN (GLS): -11.7 %
ECHO LV GLOBAL LONGITUDINAL STRAIN (GLS): -13.1 %
ECHO LV GLOBAL LONGITUDINAL STRAIN (GLS): -16.4 %
ECHO LV INTERNAL DIMENSION DIASTOLE INDEX: 2.24 CM/M2
ECHO LV INTERNAL DIMENSION DIASTOLIC: 4.7 CM (ref 4.2–5.9)
ECHO LV INTERNAL DIMENSION SYSTOLIC INDEX: 1.57 CM/M2
ECHO LV INTERNAL DIMENSION SYSTOLIC: 3.3 CM
ECHO LV IVSD: 1 CM (ref 0.6–1)
ECHO LV MASS 2D: 164.5 G (ref 88–224)
ECHO LV MASS INDEX 2D: 78.3 G/M2 (ref 49–115)
ECHO LV POSTERIOR WALL DIASTOLIC: 1 CM (ref 0.6–1)
ECHO LV RELATIVE WALL THICKNESS RATIO: 0.43
ECHO RV INTERNAL DIMENSION: 2.4 CM
EKG ATRIAL RATE: 91 BPM
EKG P AXIS: 42 DEGREES
EKG P-R INTERVAL: 140 MS
EKG Q-T INTERVAL: 372 MS
EKG QRS DURATION: 98 MS
EKG QTC CALCULATION (BAZETT): 457 MS
EKG R AXIS: -32 DEGREES
EKG T AXIS: 14 DEGREES
EKG VENTRICULAR RATE: 91 BPM
ERYTHROCYTE [DISTWIDTH] IN BLOOD BY AUTOMATED COUNT: 17.3 % (ref 11.5–14.5)
GFR SERPL CREATININE-BSD FRML MDRD: > 90 ML/MIN/1.73M2
GLUCOSE SERPL-MCNC: 116 MG/DL (ref 70–108)
HCT VFR BLD AUTO: 25.5 % (ref 42–52)
HGB BLD-MCNC: 7.5 GM/DL (ref 14–18)
MCH RBC QN AUTO: 21.9 PG (ref 26–33)
MCHC RBC AUTO-ENTMCNC: 29.4 GM/DL (ref 32.2–35.5)
MCV RBC AUTO: 74.3 FL (ref 80–94)
ORGANISM: ABNORMAL
PLATELET # BLD AUTO: 261 THOU/MM3 (ref 130–400)
PMV BLD AUTO: 9.2 FL (ref 9.4–12.4)
POTASSIUM SERPL-SCNC: 3.7 MEQ/L (ref 3.5–5.2)
PROT SERPL-MCNC: 4.8 G/DL (ref 6.1–8)
RBC # BLD AUTO: 3.43 MILL/MM3 (ref 4.7–6.1)
SODIUM SERPL-SCNC: 131 MEQ/L (ref 135–145)
WBC # BLD AUTO: 7.9 THOU/MM3 (ref 4.8–10.8)

## 2024-12-02 PROCEDURE — 71046 X-RAY EXAM CHEST 2 VIEWS: CPT

## 2024-12-02 PROCEDURE — 2580000003 HC RX 258

## 2024-12-02 PROCEDURE — 93356 MYOCRD STRAIN IMG SPCKL TRCK: CPT | Performed by: NUCLEAR MEDICINE

## 2024-12-02 PROCEDURE — 99223 1ST HOSP IP/OBS HIGH 75: CPT | Performed by: STUDENT IN AN ORGANIZED HEALTH CARE EDUCATION/TRAINING PROGRAM

## 2024-12-02 PROCEDURE — 93010 ELECTROCARDIOGRAM REPORT: CPT | Performed by: INTERNAL MEDICINE

## 2024-12-02 PROCEDURE — 36415 COLL VENOUS BLD VENIPUNCTURE: CPT

## 2024-12-02 PROCEDURE — 87040 BLOOD CULTURE FOR BACTERIA: CPT

## 2024-12-02 PROCEDURE — 6370000000 HC RX 637 (ALT 250 FOR IP): Performed by: OPHTHALMOLOGY

## 2024-12-02 PROCEDURE — 97166 OT EVAL MOD COMPLEX 45 MIN: CPT

## 2024-12-02 PROCEDURE — 97110 THERAPEUTIC EXERCISES: CPT

## 2024-12-02 PROCEDURE — 6370000000 HC RX 637 (ALT 250 FOR IP): Performed by: NURSE PRACTITIONER

## 2024-12-02 PROCEDURE — 6370000000 HC RX 637 (ALT 250 FOR IP)

## 2024-12-02 PROCEDURE — 93307 TTE W/O DOPPLER COMPLETE: CPT | Performed by: NUCLEAR MEDICINE

## 2024-12-02 PROCEDURE — 6360000002 HC RX W HCPCS

## 2024-12-02 PROCEDURE — 99232 SBSQ HOSP IP/OBS MODERATE 35: CPT | Performed by: NURSE PRACTITIONER

## 2024-12-02 PROCEDURE — 2140000000 HC CCU INTERMEDIATE R&B

## 2024-12-02 PROCEDURE — 94761 N-INVAS EAR/PLS OXIMETRY MLT: CPT

## 2024-12-02 PROCEDURE — 80053 COMPREHEN METABOLIC PANEL: CPT

## 2024-12-02 PROCEDURE — 85027 COMPLETE CBC AUTOMATED: CPT

## 2024-12-02 PROCEDURE — 99233 SBSQ HOSP IP/OBS HIGH 50: CPT

## 2024-12-02 PROCEDURE — 93005 ELECTROCARDIOGRAM TRACING: CPT

## 2024-12-02 PROCEDURE — 94669 MECHANICAL CHEST WALL OSCILL: CPT

## 2024-12-02 PROCEDURE — 93307 TTE W/O DOPPLER COMPLETE: CPT

## 2024-12-02 PROCEDURE — 97535 SELF CARE MNGMENT TRAINING: CPT

## 2024-12-02 RX ORDER — PANTOPRAZOLE SODIUM 40 MG/1
40 TABLET, DELAYED RELEASE ORAL
Status: DISCONTINUED | OUTPATIENT
Start: 2024-12-03 | End: 2024-12-02

## 2024-12-02 RX ORDER — METOPROLOL TARTRATE 25 MG/1
25 TABLET, FILM COATED ORAL 2 TIMES DAILY
Status: DISCONTINUED | OUTPATIENT
Start: 2024-12-02 | End: 2024-12-06 | Stop reason: HOSPADM

## 2024-12-02 RX ORDER — CALCIUM CARBONATE 500 MG/1
750 TABLET, CHEWABLE ORAL 3 TIMES DAILY PRN
Status: DISCONTINUED | OUTPATIENT
Start: 2024-12-02 | End: 2024-12-06 | Stop reason: HOSPADM

## 2024-12-02 RX ORDER — PANTOPRAZOLE SODIUM 40 MG/1
40 TABLET, DELAYED RELEASE ORAL
Status: DISCONTINUED | OUTPATIENT
Start: 2024-12-02 | End: 2024-12-06 | Stop reason: HOSPADM

## 2024-12-02 RX ORDER — METOPROLOL TARTRATE 25 MG/1
12.5 TABLET, FILM COATED ORAL ONCE
Status: COMPLETED | OUTPATIENT
Start: 2024-12-02 | End: 2024-12-02

## 2024-12-02 RX ORDER — ENOXAPARIN SODIUM 100 MG/ML
30 INJECTION SUBCUTANEOUS DAILY
Status: DISCONTINUED | OUTPATIENT
Start: 2024-12-02 | End: 2024-12-03

## 2024-12-02 RX ADMIN — METOPROLOL TARTRATE 25 MG: 25 TABLET, FILM COATED ORAL at 21:34

## 2024-12-02 RX ADMIN — CEFEPIME 2000 MG: 2 INJECTION, POWDER, FOR SOLUTION INTRAVENOUS at 13:22

## 2024-12-02 RX ADMIN — SODIUM CHLORIDE, PRESERVATIVE FREE 10 ML: 5 INJECTION INTRAVENOUS at 21:32

## 2024-12-02 RX ADMIN — ACETAMINOPHEN 650 MG: 325 TABLET ORAL at 14:36

## 2024-12-02 RX ADMIN — AZITHROMYCIN DIHYDRATE 500 MG: 250 TABLET ORAL at 08:53

## 2024-12-02 RX ADMIN — OXYCODONE HYDROCHLORIDE 5 MG: 5 TABLET ORAL at 14:36

## 2024-12-02 RX ADMIN — PANTOPRAZOLE SODIUM 40 MG: 40 TABLET, DELAYED RELEASE ORAL at 13:15

## 2024-12-02 RX ADMIN — OXYCODONE HYDROCHLORIDE 5 MG: 5 TABLET ORAL at 22:53

## 2024-12-02 RX ADMIN — METOPROLOL TARTRATE 12.5 MG: 25 TABLET, FILM COATED ORAL at 08:54

## 2024-12-02 RX ADMIN — CEFEPIME 2000 MG: 2 INJECTION, POWDER, FOR SOLUTION INTRAVENOUS at 05:44

## 2024-12-02 RX ADMIN — SODIUM CHLORIDE, PRESERVATIVE FREE 10 ML: 5 INJECTION INTRAVENOUS at 08:51

## 2024-12-02 RX ADMIN — METOPROLOL TARTRATE 12.5 MG: 25 TABLET, FILM COATED ORAL at 10:03

## 2024-12-02 RX ADMIN — OXYCODONE HYDROCHLORIDE 5 MG: 5 TABLET ORAL at 00:48

## 2024-12-02 RX ADMIN — PREDNISONE 10 MG: 20 TABLET ORAL at 08:54

## 2024-12-02 RX ADMIN — ACETAMINOPHEN 650 MG: 325 TABLET ORAL at 03:20

## 2024-12-02 RX ADMIN — CEFEPIME 2000 MG: 2 INJECTION, POWDER, FOR SOLUTION INTRAVENOUS at 21:34

## 2024-12-02 RX ADMIN — SIROLIMUS 1 MG: 1 TABLET ORAL at 08:54

## 2024-12-02 RX ADMIN — ANTACID TABLETS 750 MG: 500 TABLET, CHEWABLE ORAL at 12:04

## 2024-12-02 ASSESSMENT — PAIN DESCRIPTION - LOCATION
LOCATION: CHEST
LOCATION: BACK
LOCATION: GENERALIZED
LOCATION: BACK
LOCATION: BACK

## 2024-12-02 ASSESSMENT — PAIN DESCRIPTION - PAIN TYPE
TYPE: CHRONIC PAIN
TYPE: CHRONIC PAIN

## 2024-12-02 ASSESSMENT — PAIN SCALES - GENERAL
PAINLEVEL_OUTOF10: 4
PAINLEVEL_OUTOF10: 0
PAINLEVEL_OUTOF10: 5
PAINLEVEL_OUTOF10: 2
PAINLEVEL_OUTOF10: 3
PAINLEVEL_OUTOF10: 4
PAINLEVEL_OUTOF10: 2
PAINLEVEL_OUTOF10: 0
PAINLEVEL_OUTOF10: 1
PAINLEVEL_OUTOF10: 0

## 2024-12-02 ASSESSMENT — PAIN DESCRIPTION - DESCRIPTORS
DESCRIPTORS: ACHING;SORE;DISCOMFORT
DESCRIPTORS: SHARP
DESCRIPTORS: ACHING
DESCRIPTORS: ACHING

## 2024-12-02 ASSESSMENT — PAIN - FUNCTIONAL ASSESSMENT
PAIN_FUNCTIONAL_ASSESSMENT: PREVENTS OR INTERFERES SOME ACTIVE ACTIVITIES AND ADLS
PAIN_FUNCTIONAL_ASSESSMENT: ACTIVITIES ARE NOT PREVENTED

## 2024-12-02 ASSESSMENT — PAIN DESCRIPTION - ONSET
ONSET: ON-GOING
ONSET: GRADUAL

## 2024-12-02 ASSESSMENT — PAIN DESCRIPTION - ORIENTATION
ORIENTATION: MID;RIGHT
ORIENTATION: LOWER
ORIENTATION: LOWER

## 2024-12-02 ASSESSMENT — PAIN DESCRIPTION - FREQUENCY
FREQUENCY: INTERMITTENT
FREQUENCY: INTERMITTENT

## 2024-12-02 NOTE — PROCEDURES
PROCEDURE NOTE  Date: 12/2/2024   Name: Ousmane Lockett  YOB: 1965    Procedures  12 lead EKG completed. Results handed to Sunny Vazquez CET

## 2024-12-02 NOTE — PROGRESS NOTES
Hospitalist Progress Note    Patient:  Ousmane Lockett    Unit/Bed:3B-38/038-A  YOB: 1965  MRN: [id number]   Acct: [id number]   PCP: Daniel Barbosa MD  Date of Admission: 11/30/2024    ASSESSMENT AND PLAN  Active Problems  Sepsis due to pneumonia: Met 3/4 SIRS criteria upon admission- febrile, tachycardia, acute leukocytosis. Improved.   CXR reveals mild PNA of RML  Started on Cefepime, Azithromycin (11/30).   Respiratory culture, PNA panel ordered.   New onset atrial fibrillation with RVR: No rate controlled- NSR. Cardiology consulted. ECHO pending today. Pt refusing OAC at this time. On Lopressor BID. Tele.   Positive BC: 1 of 2 BC obtained upon arrival positive for coag negative staph bacteremia. ID consulted today. Repeat BC x 2 ordered today. Continue to follow at this time.   Acute on chronic microcytic anemia: Stable. Anemia work up ordered. No overt signs of bleeding. Monitor CBC.     Chronic Conditions (reviewed and stable unless otherwise stated):   NSCLC: Follows with the [facility] at [facility] for oncology.  Diagnosed earlier this year. Patient was switched to chemotherapy instead of immunotherapy , s/p 2 doses.  First dose of chemotherapy was on Wednesday of this week, 11/27.  Current chemotherapy includes carboplatin and pemetrexed.    Hx of renal transplant: In 2000 at [facility]. Currently on Sirolimus and prednisone 10 mg. Cr 0.7 on admission.      DVT Prophylaxis: [] Lovenox / [] Heparin / [x] SCDs / [] Already on Systemic Anticoagulation / [] None  LDA: []CVC / []PICC / []Midline / []Sibley / []Drains / []Mediport / [x]None  Antibiotics: yes  Steroids: yes  Labs (still needed?): [x]Yes / []No  IVF (still needed?): []Yes / [x]No    Level of care: [x]Step Down / []Med-Surg  Bed Status: [x]Inpatient / []Observation  Telemetry: [x]Yes / []No  PT/OT: [x]Yes / []No      Code status: Full Code  potassium alternative oral replacement **OR** potassium chloride, magnesium sulfate, ondansetron **OR** ondansetron, polyethylene glycol, acetaminophen **OR** acetaminophen, oxyCODONE, albuterol    Exam:  BP (!) 135/59   Pulse 91   Temp 99.5 °F (37.5 °C) (Oral)   Resp 16   Wt 92 kg (202 lb 13.2 oz)   SpO2 100%   BMI 29.95 kg/m²   General appearance: Chronically ill appearing. No apparent distress, appears stated age and cooperative.   HEENT: Pupils equal, round, and reactive to light. Conjunctivae/corneas clear.  Neck: Supple, with full range of motion. No jugular venous distention. Trachea midline.  Respiratory:  Normal respiratory effort. Clear to auscultation, bilaterally without Rales/Wheezes/Rhonchi.  Cardiovascular: Regular rate and rhythm with normal S1/S2 without murmurs, rubs or gallops.  Abdomen: Soft, non-tender, non-distended with normal bowel sounds.   Musculoskeletal: Mild 2+ pitting edema noted in LE bilaterally. passive and active ROM x 4 extremities.  Skin: Skin color, texture, turgor normal.  No rashes or lesions.  Neurologic:  Neurovascularly intact without any gross focal sensory/motor deficits. Cranial nerves: II-XII intact, grossly non-focal.  Psychiatric: Alert and oriented, thought content appropriate, normal insight  Peripheral Pulses: +2 palpable, equal bilaterally     [unfilled]    Microbiology:    Blood culture #1:   Lab Results   Component Value Date/Time    BC No growth 24 hours. 11/30/2024 08:56 AM     Blood culture #2:No results found for: \"BLOODCULT2\"  Organism:  Lab Results   Component Value Date/Time    ORG gram positive cocci in clusters 11/30/2024 08:56 AM         Lab Results   Component Value Date/Time    LABGRAM  11/30/2024 06:30 PM     Moderate segmented neutrophils observed. No bacteria seen. performed on cytospun specimen     MRSA culture only:No results found for: \"MRSAC\"  Urine culture: No results found for: \"LABURIN\"  Respiratory culture: No results found for:

## 2024-12-02 NOTE — CONSULTS
Hx and P/E :Infectious D.       Patient - Ousmane Lockett,  Age - 59 y.o.    - 1965      Room Number - 3B-38/038-A   N -  [id number]   [facility] # - [id number]  Date of Admission -  2024  8:23 AM  Patient's PCP: Daniel Barbosa MD     Requesting Physician: Deanna Trujillo PA-C    CHIEF COMPLAINT:     Coag negative staph bacteremia    ASSESSMENT AND PLAN     Patient is a 59-year-old male who does have a history significant for non-small cell lung cancer and prior renal transplant who I am consulted for positive blood culture with Staph epidermidis.    Patient is receiving chemotherapy and developed fever on day of admission.  Blood cultures positive 1 out of 2 with staph epi.  While on therapy with cefepime and azithromycin for potential pneumonia, he has had improvement in fever curve and leukocytosis.  This suggest that this was more than likely contamination and I would defer treatment at this time, repeat blood cultures obtained on .    Continue cefepime and azithromycin for potential pneumonia  Hold off on treatment for coag negative staph  Will follow repeat blood cultures obtained on       HISTORY OF PRESENT ILLNESS       This is a very pleasant 59 y.o. male who was admitted to the hospital with a chief complaints of fatigue.  Infectious disease consulted for positive blood culture.    Patient received chemotherapy on .  On , he began to develop sore throat and was noted to have a fever to 102.  On arrival to [facility], patient did have symptoms of fevers and chills as well as fatigue.  He otherwise denied any respiratory symptoms.  He was initiated on broad therapy with cefepime and azithromycin.  Blood cultures were obtained which demonstrated growth of coagulase-negative staph.    PAST MEDICAL AND SURGICAL HISTORY       Past Medical History:   Diagnosis Date    CVA (cerebral vascular accident) (HCC)     with  drink of alcohol per week.        FAMILY HISTORY:         Problem Relation Age of Onset    Cancer Mother 63        colon - is now      Heart Failure Father         heart attack w/stents    Diabetes Sister         5 years ago diagnosed    Diabetes Brother         5 years ago diagnosed       REVIEW OF SYSTEMS:     Constitutional: no fever, no night sweats, no fatigue.  Head: no head ache , no head injury, no migranes.  Eye: no blurring of vision, no double vision.  Ears: no hearing difficulty, no tinnitus  Mouth/throat: no ulceration, dental caries , dysphagia  Lungs: no cough, no shortness of breath, no wheeze  CVS: no palpitation, no chest pain, no shortness of breath  GI: no abdominal pain, no nausea , no vomiting, no constipation  KRISHNA: no dysuria, frequency and urgency, no hematuria, no kidney stones  Musculoskeletal: no joint pain, swelling , stiffness,  Endocrine: no polyuria, polydipsia, no cold or heat intolerance  Hematology: no anemia, no easy brusing or bleeding, no hx of clotting disorder  Dermatology: no skin rash, no eczema, no pruritis,  Psychiatry: no depression, no anxiety,no panic attacks, no suicide ideation    PHYSICAL EXAM:     BP (!) 143/70   Pulse 92   Temp 99.8 °F (37.7 °C) (Oral)   Resp 16   Ht 1.778 m (5' 10\")   Wt 92 kg (202 lb 13.2 oz)   SpO2 99%   BMI 29.10 kg/m²   General:  Awake, alert, not in distress.  HEENT: pink conjunctiva, unicteric sclera, moist oral mucosa.  Chest:  bilateral air entry, Clear to auscultation,   Cardiovascular:  RRR ,S1S2, no murmur or gallop.  Abdomen:  Soft, non tender to palpation.  Extremities: no edema  Skin:  Warm and dry.  CNS: aox3        LABS:     CBC:   Recent Labs     24  0840 24  0537 24  0451   WBC 14.6* 10.2 7.9   HGB 9.5* 7.5* 7.5*    276 261     BMP:    Recent Labs     24  0840 24  0537 24  0451   * 132* 131*   K 4.0 4.0 3.7   CL 95* 103 100   CO2 25 20* 22*   BUN 20 16 15   CREATININE

## 2024-12-02 NOTE — PLAN OF CARE
Problem: Chronic Conditions and Co-morbidities  Goal: Patient's chronic conditions and co-morbidity symptoms are monitored and maintained or improved  12/2/2024 1237 by Sunny Das RN  Outcome: Progressing  12/1/2024 2337 by Geena Richardson RN  Outcome: Progressing  Flowsheets (Taken 12/1/2024 2337)  Care Plan - Patient's Chronic Conditions and Co-Morbidity Symptoms are Monitored and Maintained or Improved:   Monitor and assess patient's chronic conditions and comorbid symptoms for stability, deterioration, or improvement   Collaborate with multidisciplinary team to address chronic and comorbid conditions and prevent exacerbation or deterioration   Update acute care plan with appropriate goals if chronic or comorbid symptoms are exacerbated and prevent overall improvement and discharge     Problem: Pain  Goal: Verbalizes/displays adequate comfort level or baseline comfort level  12/2/2024 1237 by Sunny Das RN  Outcome: Progressing  12/1/2024 2337 by Geena Richardson RN  Outcome: Progressing  Flowsheets (Taken 12/1/2024 2337)  Verbalizes/displays adequate comfort level or baseline comfort level:   Encourage patient to monitor pain and request assistance   Administer analgesics based on type and severity of pain and evaluate response   Consider cultural and social influences on pain and pain management   Assess pain using appropriate pain scale   Implement non-pharmacological measures as appropriate and evaluate response  Note: Ongoing assessment & interventions provided throughout shift.  Reminded patient to report any pain, pressure, or shortness of breath to the nurse.  Pain medications provided per physician's orders.      Problem: Skin/Tissue Integrity  Goal: Absence of new skin breakdown  Description: 1.  Monitor for areas of redness and/or skin breakdown  2.  Assess vascular access sites hourly  3.  Every 4-6 hours minimum:  Change oxygen saturation probe site  4.  Every 4-6 hours:  If on nasal

## 2024-12-02 NOTE — PROGRESS NOTES
[facility]  INPATIENT OCCUPATIONAL THERAPY  STRZ CCU-STEPDOWN 3B  EVALUATION      Discharge Recommendations:    Equipment Recommendations:           Time In: 1057  Time Out: 1135  Timed Code Treatment Minutes: 28 Minutes  Minutes: 38          Date: 2024  Patient Name: Ousmane Lockett,   Gender: male      MRN: [id number]  : 1965  (59 y.o.)  Referring Practitioner: Terri Regalado MD  Diagnosis: Pneumonia due to infectious organism  Additional Pertinent Hx: Per H & P on 2024:59 y.o. male with a history of metastatic lung cancer, CVA, HTN, HLD who presents to the emergency department from home for evaluation of fever and malaise.  Patient diagnosed with non-small cell cancer earlier this year, currently has mets to the shoulder and questionable mets to the adrenal gland and spleen.  Patient has gone through 2 immunotherapy and the started chemotherapy just 3 days ago.  Patient had temperature 100.5 last night and 102.5 this morning.  Patient reports feeling very fatigued, with no appetite and diffuse bodyaches.  Patient denies any chest pain, abdominal pain, dyspnea, fever, rash.  Patient has a Pleurx catheter on the right, his wife drained it last night reports no change in the color, reports the color is yellow is draining easily. Per OP OT notes:  Patient underwent right reverse shoulder replacement with radical resection of proximal humerus on 24. Patient reports that they removed 4\" of his proximal humerus.Pt was found to have metastatic bone cancer in right proximal humerus    Restrictions/Precautions:  Restrictions/Precautions: Fall Risk  Position Activity Restriction  Other position/activity restrictions: OP OT orders as of 2024:OK for AAROM for forward flexion and gentle ER, no active ROM, ok for PROM, no IR, no strengthening, ok for isometrics, ok for elbow flexion/extension; 24 orders: continue to increase activity as tolerated. ROMAT - no resisted

## 2024-12-02 NOTE — PLAN OF CARE
Problem: Chronic Conditions and Co-morbidities  Goal: Patient's chronic conditions and co-morbidity symptoms are monitored and maintained or improved  12/1/2024 2337 by Geena Richardson RN  Outcome: Progressing  Flowsheets (Taken 12/1/2024 2337)  Care Plan - Patient's Chronic Conditions and Co-Morbidity Symptoms are Monitored and Maintained or Improved:   Monitor and assess patient's chronic conditions and comorbid symptoms for stability, deterioration, or improvement   Collaborate with multidisciplinary team to address chronic and comorbid conditions and prevent exacerbation or deterioration   Update acute care plan with appropriate goals if chronic or comorbid symptoms are exacerbated and prevent overall improvement and discharge     Problem: Pain  Goal: Verbalizes/displays adequate comfort level or baseline comfort level  12/1/2024 2337 by Geena Richardson RN  Outcome: Progressing  Flowsheets (Taken 12/1/2024 2337)  Verbalizes/displays adequate comfort level or baseline comfort level:   Encourage patient to monitor pain and request assistance   Administer analgesics based on type and severity of pain and evaluate response   Consider cultural and social influences on pain and pain management   Assess pain using appropriate pain scale   Implement non-pharmacological measures as appropriate and evaluate response  Note: Ongoing assessment & interventions provided throughout shift.  Reminded patient to report any pain, pressure, or shortness of breath to the nurse.  Pain medications provided per physician's orders.      Problem: Skin/Tissue Integrity  Goal: Absence of new skin breakdown  Description: 1.  Monitor for areas of redness and/or skin breakdown  2.  Assess vascular access sites hourly  3.  Every 4-6 hours minimum:  Change oxygen saturation probe site  4.  Every 4-6 hours:  If on nasal continuous positive airway pressure, respiratory therapy assess nares and determine need for appliance change or resting

## 2024-12-02 NOTE — PROGRESS NOTES
Cardiology Progress Note      Patient:  Ousmane Lockett  YOB: 1965  MRN: [id number]   Acct: [id number]  Admit Date:  11/30/2024  Primary Cardiologist: none  Seen by Dr. Koehler     Per prior cardiology consult note-  CHIEF COMPLAINT:  Afib RVR        HPI: This is a pleasant 59 y.o. male presents with hx of NSCLC on active chemo/immunotherapy, hx of renal transplant 2000, sepsis due to PNA who presents with PNA management and now has Afib RVR with -170s.  He has had NSR at times.  Was given BB IV.  Was started on Diltiazem gtt.  Has hx of CVA int he past.  Family discussing OAC.  TTE pending.  No chest pain.  RLL infiltrate on CXR.  No prior cardiac issues.   2021 - stress negative for ischemia.   No chest pain, angina, KLEIN, orthopnea, PND, sob at rest, palpitations, LE edema, or syncope.        Subjective (Events in last 24 hours):   Pt in bed   He states he hurts all over - did have RT sided chest pain - sharp - non-radiating this am - EKG no ischemia or concerning changes - SR     Tele SR no ectopy   BP stable     Pt doesn't want any OAC unless he has recurrent AFB     Echo pending today     Objective:   BP (!) 135/59   Pulse 91   Temp 99.5 °F (37.5 °C) (Oral)   Resp 16   Wt 92 kg (202 lb 13.2 oz)   SpO2 100%   BMI 29.95 kg/m²        TELEMETRY: SR no ectopy     Physical Exam:  General Appearance: alert and oriented to person, place and time, in no acute distress  Cardiovascular: normal rate, regular rhythm, normal S1 and S2, no murmurs, rubs, clicks, or gallops, distal pulses intact,   Pulmonary/Chest: clear to auscultation bilaterally- no wheezes, rales or rhonchi, normal air movement, no respiratory distress  Abdomen: soft, non-tender, non-distended, normal bowel sounds, no masses Extremities: no cyanosis, clubbing or edema, pulse   Skin: warm and dry, no rash or erythema  Head: normocephalic and atraumatic  Eyes: pupils equal, round, and reactive to light  Neck: supple and  if he had recurrent AFB I would highly suggest OAC   Event at DC   Please call for questions or concerns  we will follow PRN            Electronically signed by JENSEN Bacon CNP on 12/2/2024 at 9:10 AM

## 2024-12-02 NOTE — PROGRESS NOTES
Patient received sacramental anointing by a . If you are in need of  support, please call [phone]. If you are in need of a  after 6:30pm, please call the house supervisor for the on-call .      Katie Lovelace  [facility] Health Coordinator  [phone]

## 2024-12-02 NOTE — PROGRESS NOTES
[facility]  PHYSICAL THERAPY MISSED TREATMENT NOTE  STRZ CCU-STEPDOWN 3B    Date: 2024  Patient Name: Ousmane Lockett        MRN: [id number]   : 1965  (59 y.o.)  Gender: male                REASON FOR MISSED TREATMENT:  Echo arriving to room at time of attempt. PT to reattempt at later date/time .      Carmella Colmenares, PT, DPT

## 2024-12-02 NOTE — CARE COORDINATION
Case Management Assessment Initial Evaluation    Date/Time of Evaluation: 2024 12:31 PM  Assessment Completed by: Lolita Linares RN    If patient is discharged prior to next notation, then this note serves as note for discharge by case management.    Patient Name: Ousmane Lockett                   YOB: 1965  Diagnosis: History of lung cancer [Z85.118]  Pneumonia of right lower lobe due to infectious organism [J18.9]  Pneumonia due to infectious organism [J18.9]  Sepsis without acute organ dysfunction, due to unspecified organism (HCC) [A41.9]                   Date / Time: 2024  8:23 AM  Location: [address] [place] [address]     Patient Admission Status: Inpatient   Readmission Risk Low 0-14, Mod 15-19), High > 20: Readmission Risk Score: 23.7    Current PCP: Daniel Barbosa MD  Health Care Decision Makers:   Primary Decision Maker: Monica Lockett - [facility] - [phone]    Additional Case Management Notes: Admitted through ED with fever. Pt has history of lung cancer, just received first dose of chemo last week. Tmax 102.2F since arrival. Was tachycardic upon arrival, NSR now. Room air. Sodium 131, Hgb 7.5. Pt with PleurX drain in place upon arrival. Blood culture #2: Staph coag negative. BioFire: Staph epidermidis. Cefepime iv q8hr.     Procedures:    Echo: ordered .     Imagin/30 CXR:  1. Infiltrate and effusion at the right lung base. Catheter at the base of the  right hemithorax.  2. Right shoulder replacement.   CXR:   1. Normal heart size. Right shoulder prosthesis. Mild atelectasis/pneumonia  right midlung.  2. Moderate-sized pleural effusion right side. PleurX catheter in pleural space right side. Minimal left basilar atelectasis/pneumonia.   3. Overall appearance of chest slightly worse than prior.    Patient Goals/Plan/Treatment Preferences: Plan home with wife. Has PleurX drain, wife manages. Current with OP PT at the  in [place]. Monitor for needs.

## 2024-12-03 ENCOUNTER — APPOINTMENT (OUTPATIENT)
Dept: OCCUPATIONAL THERAPY | Age: 59
End: 2024-12-03
Payer: COMMERCIAL

## 2024-12-03 ENCOUNTER — APPOINTMENT (OUTPATIENT)
Dept: CT IMAGING | Age: 59
End: 2024-12-03
Payer: COMMERCIAL

## 2024-12-03 ENCOUNTER — APPOINTMENT (OUTPATIENT)
Dept: INTERVENTIONAL RADIOLOGY/VASCULAR | Age: 59
End: 2024-12-03
Payer: COMMERCIAL

## 2024-12-03 PROBLEM — E44.0 MODERATE MALNUTRITION (HCC): Chronic | Status: ACTIVE | Noted: 2024-12-03

## 2024-12-03 LAB
ANION GAP SERPL CALC-SCNC: 12 MEQ/L (ref 8–16)
BASOPHILS ABSOLUTE: 0 THOU/MM3 (ref 0–0.1)
BASOPHILS NFR BLD AUTO: 0.2 %
BUN SERPL-MCNC: 14 MG/DL (ref 7–22)
CALCIUM SERPL-MCNC: 8 MG/DL (ref 8.5–10.5)
CHLORIDE SERPL-SCNC: 99 MEQ/L (ref 98–111)
CO2 SERPL-SCNC: 21 MEQ/L (ref 23–33)
CREAT SERPL-MCNC: 0.5 MG/DL (ref 0.4–1.2)
DEPRECATED RDW RBC AUTO: 45.7 FL (ref 35–45)
EOSINOPHIL NFR BLD AUTO: 1.2 %
EOSINOPHILS ABSOLUTE: 0.1 THOU/MM3 (ref 0–0.4)
ERYTHROCYTE [DISTWIDTH] IN BLOOD BY AUTOMATED COUNT: 17.5 % (ref 11.5–14.5)
FERRITIN SERPL IA-MCNC: 1153 NG/ML (ref 22–322)
FOLATE SERPL-MCNC: 10.3 NG/ML (ref 4.8–24.2)
GFR SERPL CREATININE-BSD FRML MDRD: > 90 ML/MIN/1.73M2
GLUCOSE SERPL-MCNC: 95 MG/DL (ref 70–108)
HCT VFR BLD AUTO: 25.9 % (ref 42–52)
HGB BLD-MCNC: 7.4 GM/DL (ref 14–18)
HYPOCHROMIA BLD QL SMEAR: PRESENT
IMM GRANULOCYTES # BLD AUTO: 0.05 THOU/MM3 (ref 0–0.07)
IMM GRANULOCYTES NFR BLD AUTO: 1.2 %
IRON SATN MFR SERPL: 27 % (ref 20–50)
IRON SERPL-MCNC: 34 UG/DL (ref 65–195)
L PNEUMO1 AG UR QL IA.RAPID: NEGATIVE
LYMPHOCYTES ABSOLUTE: 1.3 THOU/MM3 (ref 1–4.8)
LYMPHOCYTES NFR BLD AUTO: 31.8 %
MCH RBC QN AUTO: 21 PG (ref 26–33)
MCHC RBC AUTO-ENTMCNC: 28.6 GM/DL (ref 32.2–35.5)
MCV RBC AUTO: 73.6 FL (ref 80–94)
MONOCYTES ABSOLUTE: 0.2 THOU/MM3 (ref 0.4–1.3)
MONOCYTES NFR BLD AUTO: 5.2 %
NEUTROPHILS ABSOLUTE: 2.5 THOU/MM3 (ref 1.8–7.7)
NEUTROPHILS NFR BLD AUTO: 60.4 %
NRBC BLD AUTO-RTO: 0 /100 WBC
ORIGINAL SAMPLE NUMBER: NORMAL
ORIGINAL SAMPLE NUMBER: NORMAL
OSMOLALITY SERPL: NORMAL MOSMOL/KG (ref 275–295)
OSMOLALITY UR: NORMAL MOSMOL/KG (ref 250–750)
PLATELET # BLD AUTO: 218 THOU/MM3 (ref 130–400)
PMV BLD AUTO: 9.3 FL (ref 9.4–12.4)
POTASSIUM SERPL-SCNC: 3.9 MEQ/L (ref 3.5–5.2)
RBC # BLD AUTO: 3.52 MILL/MM3 (ref 4.7–6.1)
REFERENCE LOCATION: NORMAL
REFERENCE LOCATION: NORMAL
REFERENCE RANGE: NORMAL
REFERENCE RANGE: NORMAL
SODIUM SERPL-SCNC: 132 MEQ/L (ref 135–145)
SODIUM UR-SCNC: 116 MEQ/L
STREP PNEUMO AG, UR: NEGATIVE
TEST RESULTS WITH UNITS: 281
TEST RESULTS WITH UNITS: 822
TEST(S) BEING PERFORMED: NORMAL
TEST(S) BEING PERFORMED: NORMAL
TIBC SERPL-MCNC: 126 UG/DL (ref 171–450)
VIT B12 SERPL-MCNC: 375 PG/ML (ref 211–911)
WBC # BLD AUTO: 4.2 THOU/MM3 (ref 4.8–10.8)

## 2024-12-03 PROCEDURE — 36415 COLL VENOUS BLD VENIPUNCTURE: CPT

## 2024-12-03 PROCEDURE — 93970 EXTREMITY STUDY: CPT

## 2024-12-03 PROCEDURE — 6370000000 HC RX 637 (ALT 250 FOR IP): Performed by: OPHTHALMOLOGY

## 2024-12-03 PROCEDURE — 97116 GAIT TRAINING THERAPY: CPT

## 2024-12-03 PROCEDURE — 80048 BASIC METABOLIC PNL TOTAL CA: CPT

## 2024-12-03 PROCEDURE — 97530 THERAPEUTIC ACTIVITIES: CPT

## 2024-12-03 PROCEDURE — 97535 SELF CARE MNGMENT TRAINING: CPT

## 2024-12-03 PROCEDURE — 6370000000 HC RX 637 (ALT 250 FOR IP)

## 2024-12-03 PROCEDURE — 97162 PT EVAL MOD COMPLEX 30 MIN: CPT

## 2024-12-03 PROCEDURE — 82746 ASSAY OF FOLIC ACID SERUM: CPT

## 2024-12-03 PROCEDURE — 6370000000 HC RX 637 (ALT 250 FOR IP): Performed by: NURSE PRACTITIONER

## 2024-12-03 PROCEDURE — 84300 ASSAY OF URINE SODIUM: CPT

## 2024-12-03 PROCEDURE — 2140000000 HC CCU INTERMEDIATE R&B

## 2024-12-03 PROCEDURE — 83935 ASSAY OF URINE OSMOLALITY: CPT

## 2024-12-03 PROCEDURE — 97110 THERAPEUTIC EXERCISES: CPT

## 2024-12-03 PROCEDURE — 85025 COMPLETE CBC W/AUTO DIFF WBC: CPT

## 2024-12-03 PROCEDURE — 2580000003 HC RX 258

## 2024-12-03 PROCEDURE — 82728 ASSAY OF FERRITIN: CPT

## 2024-12-03 PROCEDURE — 6360000002 HC RX W HCPCS

## 2024-12-03 PROCEDURE — 71275 CT ANGIOGRAPHY CHEST: CPT

## 2024-12-03 PROCEDURE — 99232 SBSQ HOSP IP/OBS MODERATE 35: CPT | Performed by: STUDENT IN AN ORGANIZED HEALTH CARE EDUCATION/TRAINING PROGRAM

## 2024-12-03 PROCEDURE — 83550 IRON BINDING TEST: CPT

## 2024-12-03 PROCEDURE — 83930 ASSAY OF BLOOD OSMOLALITY: CPT

## 2024-12-03 PROCEDURE — 6360000004 HC RX CONTRAST MEDICATION: Performed by: INTERNAL MEDICINE

## 2024-12-03 PROCEDURE — 82607 VITAMIN B-12: CPT

## 2024-12-03 PROCEDURE — 83540 ASSAY OF IRON: CPT

## 2024-12-03 RX ORDER — IOPAMIDOL 755 MG/ML
80 INJECTION, SOLUTION INTRAVASCULAR
Status: COMPLETED | OUTPATIENT
Start: 2024-12-03 | End: 2024-12-03

## 2024-12-03 RX ORDER — PROMETHAZINE HYDROCHLORIDE 25 MG/ML
6.25 INJECTION, SOLUTION INTRAMUSCULAR; INTRAVENOUS ONCE
Status: COMPLETED | OUTPATIENT
Start: 2024-12-03 | End: 2024-12-03

## 2024-12-03 RX ORDER — ENOXAPARIN SODIUM 100 MG/ML
1 INJECTION SUBCUTANEOUS 2 TIMES DAILY
Status: DISCONTINUED | OUTPATIENT
Start: 2024-12-03 | End: 2024-12-04

## 2024-12-03 RX ORDER — FERROUS SULFATE 325(65) MG
325 TABLET ORAL 2 TIMES DAILY WITH MEALS
Status: DISCONTINUED | OUTPATIENT
Start: 2024-12-03 | End: 2024-12-06 | Stop reason: HOSPADM

## 2024-12-03 RX ADMIN — ENOXAPARIN SODIUM 90 MG: 100 INJECTION SUBCUTANEOUS at 11:40

## 2024-12-03 RX ADMIN — FERROUS SULFATE TAB 325 MG (65 MG ELEMENTAL FE) 325 MG: 325 (65 FE) TAB at 09:55

## 2024-12-03 RX ADMIN — OXYCODONE HYDROCHLORIDE 5 MG: 5 TABLET ORAL at 23:35

## 2024-12-03 RX ADMIN — CEFEPIME 2000 MG: 2 INJECTION, POWDER, FOR SOLUTION INTRAVENOUS at 15:29

## 2024-12-03 RX ADMIN — PANTOPRAZOLE SODIUM 40 MG: 40 TABLET, DELAYED RELEASE ORAL at 05:49

## 2024-12-03 RX ADMIN — SIROLIMUS 1 MG: 1 TABLET ORAL at 08:21

## 2024-12-03 RX ADMIN — METOPROLOL TARTRATE 25 MG: 25 TABLET, FILM COATED ORAL at 08:21

## 2024-12-03 RX ADMIN — PROMETHAZINE HYDROCHLORIDE 6.25 MG: 25 INJECTION INTRAMUSCULAR; INTRAVENOUS at 09:55

## 2024-12-03 RX ADMIN — CEFEPIME 2000 MG: 2 INJECTION, POWDER, FOR SOLUTION INTRAVENOUS at 05:49

## 2024-12-03 RX ADMIN — PREDNISONE 10 MG: 20 TABLET ORAL at 08:21

## 2024-12-03 RX ADMIN — CEFEPIME 2000 MG: 2 INJECTION, POWDER, FOR SOLUTION INTRAVENOUS at 21:50

## 2024-12-03 RX ADMIN — METOPROLOL TARTRATE 25 MG: 25 TABLET, FILM COATED ORAL at 21:46

## 2024-12-03 RX ADMIN — ENOXAPARIN SODIUM 90 MG: 100 INJECTION SUBCUTANEOUS at 21:46

## 2024-12-03 RX ADMIN — IOPAMIDOL 80 ML: 755 INJECTION, SOLUTION INTRAVENOUS at 23:17

## 2024-12-03 RX ADMIN — FERROUS SULFATE TAB 325 MG (65 MG ELEMENTAL FE) 325 MG: 325 (65 FE) TAB at 16:27

## 2024-12-03 RX ADMIN — ONDANSETRON 4 MG: 2 INJECTION INTRAMUSCULAR; INTRAVENOUS at 06:52

## 2024-12-03 RX ADMIN — OXYCODONE HYDROCHLORIDE 5 MG: 5 TABLET ORAL at 16:27

## 2024-12-03 RX ADMIN — SODIUM CHLORIDE, PRESERVATIVE FREE 10 ML: 5 INJECTION INTRAVENOUS at 21:47

## 2024-12-03 ASSESSMENT — PAIN DESCRIPTION - DESCRIPTORS
DESCRIPTORS: ACHING
DESCRIPTORS: ACHING;SORE

## 2024-12-03 ASSESSMENT — PAIN DESCRIPTION - ORIENTATION
ORIENTATION: MID
ORIENTATION: MID

## 2024-12-03 ASSESSMENT — PAIN DESCRIPTION - LOCATION
LOCATION: BACK
LOCATION: BACK

## 2024-12-03 ASSESSMENT — PAIN DESCRIPTION - ONSET: ONSET: ON-GOING

## 2024-12-03 ASSESSMENT — PAIN SCALES - GENERAL
PAINLEVEL_OUTOF10: 4
PAINLEVEL_OUTOF10: 1
PAINLEVEL_OUTOF10: 5

## 2024-12-03 ASSESSMENT — PAIN DESCRIPTION - FREQUENCY: FREQUENCY: CONTINUOUS

## 2024-12-03 ASSESSMENT — PAIN DESCRIPTION - PAIN TYPE
TYPE: CHRONIC PAIN
TYPE: CHRONIC PAIN

## 2024-12-03 NOTE — CONSENT
Informed Consent for Blood Component Transfusion Note    I have discussed with the patient the rationale for blood component transfusion; its benefits in treating or preventing fatigue, organ damage, or death; and its risk which includes mild transfusion reactions, rare risk of blood borne infection, or more serious but rare reactions. I have discussed the alternatives to transfusion, including the risk and consequences of not receiving transfusion. The patient had an opportunity to ask questions and had agreed to proceed with transfusion of blood components.    Electronically signed by Deanna Truijllo PA-C on 12/3/24 at 9:44 AM EST

## 2024-12-03 NOTE — PROGRESS NOTES
[facility]  INPATIENT PHYSICAL THERAPY  EVALUATION  [facility] CCU-STEPDOWN 3B - 3B-38/038-A    Discharge Recommendations: Home with assist PRN, Outpatient PT, Continue to assess pending progress  Equipment Recommendations: Yes (may need RW, will continue as assess)           Time In: 1002  Time Out: 1033  Timed Code Treatment Minutes: 23 Minutes  Minutes: 31        Date: 12/3/2024  Patient Name: Ousmane Lockett,  Gender:  male        MRN: [id number]  : 1965  (59 y.o.)      Referring Practitioner: Terri Regalado MD  Diagnosis: Pneumonia due to infectious organism  Additional Pertinent Hx: Per H & P on 2024:59 y.o. male with a history of metastatic lung cancer, CVA, HTN, HLD who presents to the emergency department from home for evaluation of fever and malaise.  Patient diagnosed with non-small cell cancer earlier this year, currently has mets to the shoulder and questionable mets to the adrenal gland and spleen.  Patient has gone through 2 immunotherapy and the started chemotherapy just 3 days ago.  Patient had temperature 100.5 last night and 102.5 this morning.  Patient reports feeling very fatigued, with no appetite and diffuse bodyaches.  Patient denies any chest pain, abdominal pain, dyspnea, fever, rash.  Patient has a Pleurx catheter on the right, his wife drained it last night reports no change in the color, reports the color is yellow is draining easily. Per OP OT notes:  Patient underwent right reverse shoulder replacement with radical resection of proximal humerus on 24. Patient reports that they removed 4\" of his proximal humerus.Pt was found to have metastatic bone cancer in right proximal humerus     Restrictions/Precautions:  Restrictions/Precautions: Fall Risk       Other position/activity restrictions: OP OT orders as of 2024:OK for AAROM for forward flexion and gentle ER, no active ROM, ok for PROM, no IR, no strengthening, ok for isometrics, ok for elbow  medical, social and functional history, completion of standardized testing, formal and informal observation of tasks, assessment of data and development of plan of care and goals.  Treatment time included skilled education and facilitation of tasks to increase safety and independence with functional mobility for improved independence and quality of life.    Assessment:  Body Structures, Functions, Activity Limitations Requiring Skilled Therapeutic Intervention: Decreased functional mobility , Decreased strength, Increased pain, Decreased balance, Decreased endurance  Assessment: Ousmane Lockett is a 59 y.o. male that presents with History of lung cancer. Pt demonstrates a decrease in baseline by way of bed mobility, transfers and ambulation secondary to decreased activity tolerance, strength, fatigue, and balance deficits. Pt will benefit from skilled PT services throughout admission and beyond hospital discharge for improvements in functional mobility and in order to decrease fall risk and return pt to PLOF.     Therapy Prognosis: Good    Requires PT Follow-Up: Yes    Patient Education:      .    Patient Education  Education Given To: Patient, Family  Education Provided: Role of Therapy, Plan of Care  Education Method: Verbal  Barriers to Learning: None  Education Outcome: Verbalized understanding       Plan:  Current Treatment Recommendations: Strengthening, Balance training, Functional mobility training, Transfer training, Gait training, Stair training, Safety education & training, Patient/Caregiver education & training, Equipment evaluation, education, & procurement, Therapeutic activities  General Plan:  (5x)    Goals:  Patient Goals : Patient goal to go home.  Short Term Goals  Time Frame for Short Term Goals: By discharge  Short Term Goal 1: Supine to/from sit at Mod I in order to get in/out of bed.  Short Term Goal 2: Sit to/from stand at Mod I in order to get up to walk.  Short Term Goal 3: Ambulate

## 2024-12-03 NOTE — PROGRESS NOTES
Hospitalist Progress Note    Patient:  Ousmane Lockett    Unit/Bed:3B-38/038-A  YOB: 1965  MRN: [id number]   Acct: [id number]   PCP: Daniel Barbosa MD  Date of Admission: 11/30/2024    ASSESSMENT AND PLAN  Active Problems  Sepsis due to pneumonia: Met 3/4 SIRS criteria upon admission- febrile, tachycardia, acute leukocytosis. Improved.   CXR reveals mild PNA of RML  Started on Cefepime, Azithromycin (11/30).   Respiratory culture, PNA panel ordered.   New onset atrial fibrillation with RVR: No rate controlled- NSR. Cardiology consulted.   ECHO  12/2 reveals EF 55-60% with mildly abnormal strain pattern. Discussed with WILEY Sanchez with Cardiology- will order CTA chest as pt is endorsing significant dyspnea with minimal exertion. Bilateral venous dopplers ordered.     Pt refusing OAC at this time. On Lopressor BID. Tele.   Positive BC: 1 of 2 BC obtained upon arrival positive for coag negative staph bacteremia. ID consulted today. Repeat BC x 2 ordered today. Continue to follow at this time, suspect contamination.   Acute on chronic microcytic anemia: Stable. Anemia work up reveals iron deficiency, suspected elevated ferritin in acute phase given #1. No overt signs of bleeding. Monitor CBC. Start ferrous sulfate. Blood consent obtained.     Chronic Conditions (reviewed and stable unless otherwise stated):   NSCLC: Follows with the [facility] at [facility] for oncology.  Diagnosed earlier this year. Patient was switched to chemotherapy instead of immunotherapy , s/p 2 doses.  First dose of chemotherapy was on Wednesday of this week, 11/27.  Current chemotherapy includes carboplatin and pemetrexed.    Hx of renal transplant: In 2000 at [facility]. Currently on Sirolimus and prednisone 10 mg. Cr 0.7 on admission.      DVT Prophylaxis: [] Lovenox / [] Heparin / [x] SCDs / [] Already on Systemic Anticoagulation / [] None  LDA: []CVC / []PICC /  report has been created using voice recognition software. It may contain   minor errors which are inherent in voice recognition technology.**      Electronically signed by Dr. Christiano Abdullahi        XR CHEST (2 VW)    Result Date: 11/30/2024  PROCEDURE: XR CHEST (2 VW) CLINICAL INFORMATION: fever. COMPARISON: Chest x-ray dated 9/30/2024. TECHNIQUE: PA and lateral views the chest. FINDINGS: The heart size is normal.  The mediastinum is not widened.  There is increased density at the right lung base suggestive of infiltrate and effusion. There is a catheter at the base of the right hemithorax. The pulmonary vascularity is normal. There is a right shoulder replacement in place.      1. Infiltrate and effusion at the right lung base. Catheter at the base of the right hemithorax. 2. Right shoulder replacement. **This report has been created using voice recognition software. It may contain minor errors which are inherent in voice recognition technology.** Electronically signed by Dr. Christiano Abdullahi      Electronically signed by Deanna Trujillo PA-C on 12/3/2024 at 8:31 AM

## 2024-12-03 NOTE — PLAN OF CARE
Problem: Chronic Conditions and Co-morbidities  Goal: Patient's chronic conditions and co-morbidity symptoms are monitored and maintained or improved  12/2/2024 2357 by José Miguel De La Rosa RN  Outcome: Progressing  12/2/2024 1237 by Sunny Das RN  Outcome: Progressing     Problem: Pain  Goal: Verbalizes/displays adequate comfort level or baseline comfort level  12/2/2024 2357 by José Miguel De La Rosa RN  Outcome: Progressing  12/2/2024 1237 by Sunny Das RN  Outcome: Progressing     Problem: Skin/Tissue Integrity  Goal: Absence of new skin breakdown  Description: 1.  Monitor for areas of redness and/or skin breakdown  2.  Assess vascular access sites hourly  3.  Every 4-6 hours minimum:  Change oxygen saturation probe site  4.  Every 4-6 hours:  If on nasal continuous positive airway pressure, respiratory therapy assess nares and determine need for appliance change or resting period.  12/2/2024 2357 by José Miguel De La Rosa RN  Outcome: Progressing  12/2/2024 1237 by Sunny Das RN  Outcome: Progressing     Problem: ABCDS Injury Assessment  Goal: Absence of physical injury  12/2/2024 2357 by José Miguel De La Rosa RN  Outcome: Progressing  12/2/2024 1237 by uSnny Das RN  Outcome: Progressing     Problem: Safety - Adult  Goal: Free from fall injury  12/2/2024 2357 by José Miguel De La Rosa RN  Outcome: Progressing  12/2/2024 1237 by Sunny Das RN  Outcome: Progressing     Problem: Respiratory - Adult  Goal: Clear lung sounds  12/2/2024 2357 by José Miguel De La Rosa RN  Outcome: Progressing  12/2/2024 1237 by Sunny Das RN  Outcome: Progressing     Problem: Discharge Planning  Goal: Discharge to home or other facility with appropriate resources  12/2/2024 2357 by José Miguel De La Rosa RN  Outcome: Progressing  12/2/2024 1237 by Sunny Das RN  Outcome: Progressing

## 2024-12-03 NOTE — PROGRESS NOTES
Comprehensive Nutrition Assessment    Type and Reason for Visit:  Initial, Positive nutrition screen (Weight Loss; Decreased Appetite)    Nutrition Recommendations/Plan:   Continue diet per provider and encourage adequate PO intake at best efforts.   Start ONS: Ensure PLUS/Enlive (TID)   Consider appetite stimulant if appetite does not improve; new chemo tx start (11/27) and would suspect PO intake to continue to be inadequate     Malnutrition Assessment:  Malnutrition Status:  Moderate malnutrition (12/03/24 1324)    Context:  Chronic Illness     Findings of the 6 clinical characteristics of malnutrition:  Energy Intake:  75% or less estimated energy requirements for 1 month or longer (~50-75% for the last 4 weeks)  Weight Loss:   (5.2% loss in 4 months; however some edema noted this admit)     Body Fat Loss:  Mild body fat loss Orbital   Muscle Mass Loss:  Mild muscle mass loss Temples (temporalis), Clavicles (pectoralis & deltoids)  Fluid Accumulation:   (moderate) Extremities   Strength:  Not Performed    Nutrition Assessment:     Pt. moderately malnourished AEB criteria as listed above.  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        Nutrition Related Findings:    Pt. Report/Treatments/Miscellaneous: Pt seen, reports poor appetite for the last ~4 weeks. He states he averages 1-2 meals per day and occasionally will have some snacks but it depends. Pt does not know how much weight he has lost. Pt has not been drinking any ONS but has agreed to try. Denies emesis but reports nausea. Encouraged adequate PO intake at best efforts.   GI Status: BM - 11/30  Pertinent Labs: Na 132  Pertinent Meds: cefepime, ferrous sulfate,

## 2024-12-03 NOTE — PROGRESS NOTES
[facility] Infectious Disease         Progress Note      Ousmane Lockett  MEDICAL RECORD NUMBER:  [id number]  AGE: 59 y.o.   GENDER: male  : 1965  EPISODE DATE:  2024      Assessment:     Patient is a 59-year-old male who does have a history significant for non-small cell lung cancer and prior renal transplant who I am consulted for positive blood culture with Staph epidermidis.     Patient is receiving chemotherapy and developed fever on day of admission.  Blood cultures positive 1 out of 2 with staph epi.  While on therapy with cefepime and azithromycin for potential pneumonia, he has had improvement in fever curve and leukocytosis.  This suggest that this was more than likely contamination and I would defer treatment at this time, repeat blood cultures obtained on .     Repeat blood cultures remain in process, obtained on  at 9 AM  Remains on therapy with cefepime in setting of chemotherapy and potential lower respiratory tract infection  Not currently on therapy for coagulase-negative staph from blood   C/w contamination      Subjective:     Chief Complaint   Patient presents with    Malaise    Fever         No acute events overnight.  Discussed with nursing regarding fever curve.  No complaints or concerns this a.m.      Patient Active Problem List   Diagnosis Code    Sprain of ligament of lumbosacral joint S33.9XXA    Sprain of neck S13.9XXA    Thoracic sprain and strain [id number]    CVA (cerebral vascular accident) ([facility]) I63.9    History of renal transplant Z94.0    History of non-Hodgkin lymphoma Z85.72    Hyperlipidemia E78.5    Hypertension I10    Biliary colic K80.50    Immunosuppressed status ([facility]) D84.9    Metastatic carcinoma involving bone with unknown primary site ([facility]) C79.51, C80.1    Primary malignant neoplasm of right lower lobe of lung ([facility]) C34.31    Recurrent right pleural effusion J90    History of lung cancer Z85.118    Abnormal x-ray R93.89     week     Comment: very rare use.     Drug use: No       ALLERGIES    No Known Allergies    MEDICATIONS    No current facility-administered medications on file prior to encounter.     Current Outpatient Medications on File Prior to Encounter   Medication Sig Dispense Refill    sirolimus (RAPAMUNE) 1 MG tablet Take 1 tablet by mouth daily      oxyCODONE (ROXICODONE) 5 MG immediate release tablet Take 1 tablet by mouth every 6 hours as needed.      polycarbophil (FIBERCON) 625 MG tablet Take 1 tablet by mouth daily      Cholecalciferol (VITAMIN D3) 1.25 MG (38655 UT) CAPS Take by mouth      furosemide (LASIX) 20 MG tablet Take 1 tablet by mouth daily (Patient taking differently: Take 1 tablet by mouth 2 times daily) 90 tablet 4    Misc Natural Products (GLUCOSAMINE CHOND COMPLEX/MSM PO) Take  by mouth.      Ascorbic Acid (VITAMIN C) 500 MG tablet Take 2 tablets by mouth daily      predniSONE (DELTASONE) 10 MG tablet TAKE 1 TABLET DAILY 90 tablet 1    acyclovir (ZOVIRAX) 200 MG capsule Take by mouth as needed         REVIEW OF SYSTEMS    Constitutional: no fever, no night sweats, no fatigue.  Head: no head ache , no head injury, no migranes.  Eye: no blurring of vision, no double vision.  Ears: no hearing difficulty, no tinnitus  Mouth/throat: no ulceration, dental caries , dysphagia  Lungs: no cough no chest pain  CVS: no palpitation, no chest pain, no shortness of breath  GI: no abdominal pain, no nausea , no vomiting, no constipation  KRISHNA: no dysuria, frequency and urgency, no hematuria, no kidney stones  Musculoskeletal: no joint pain, swelling , stiffness,  Endocrine: no polyuria, polydipsia, no cold or heat intolerance  Hematology: no anemia, no easy brusing or bleeding, no hx of clotting disorder  Dermatology: no skin rash, no eczema, no pruritis,  Psychiatry: no depression, no anxiety,no panic attacks, no suicide ideation      Objective:      BP (!) 143/61   Pulse 89   Temp (!) 110.1 °F (43.4 °C) (Oral)

## 2024-12-03 NOTE — PLAN OF CARE
Problem: Chronic Conditions and Co-morbidities  Goal: Patient's chronic conditions and co-morbidity symptoms are monitored and maintained or improved  12/3/2024 1158 by Carmella Hylton RN  Outcome: Progressing  Flowsheets (Taken 12/3/2024 1158)  Care Plan - Patient's Chronic Conditions and Co-Morbidity Symptoms are Monitored and Maintained or Improved: Monitor and assess patient's chronic conditions and comorbid symptoms for stability, deterioration, or improvement     Problem: Pain  Goal: Verbalizes/displays adequate comfort level or baseline comfort level  12/3/2024 1158 by Carmella Hylton RN  Outcome: Progressing  Flowsheets (Taken 12/3/2024 1158)  Verbalizes/displays adequate comfort level or baseline comfort level: Encourage patient to monitor pain and request assistance     Problem: Skin/Tissue Integrity  Goal: Absence of new skin breakdown  Description: 1.  Monitor for areas of redness and/or skin breakdown  2.  Assess vascular access sites hourly  3.  Every 4-6 hours minimum:  Change oxygen saturation probe site  4.  Every 4-6 hours:  If on nasal continuous positive airway pressure, respiratory therapy assess nares and determine need for appliance change or resting period.  12/3/2024 1158 by Carmella Hylton RN  Outcome: Progressing     Problem: ABCDS Injury Assessment  Goal: Absence of physical injury  12/3/2024 1158 by Carmella Hylton RN  Outcome: Progressing  Flowsheets (Taken 12/3/2024 1158)  Absence of Physical Injury: Implement safety measures based on patient assessment     Problem: Safety - Adult  Goal: Free from fall injury  12/3/2024 1158 by Carmella Hylton RN  Outcome: Progressing  Flowsheets (Taken 12/3/2024 1158)  Free From Fall Injury:   Instruct family/caregiver on patient safety   Based on caregiver fall risk screen, instruct family/caregiver to ask for assistance with transferring infant if caregiver noted to have fall risk factors     Problem: Respiratory - Adult  Goal: Clear

## 2024-12-03 NOTE — PROGRESS NOTES
[facility]  [facility] CCU-STEPDOWN 3B  Occupational Therapy  Daily Note    Discharge Recommendations: Continue to assess pending progress, Home with assist as needed, Home with Outpatient OT, and Patient would benefit from continued OT at discharge  Equipment Recommendations:          Time In: 1439  Time Out: 1507  Timed Code Treatment Minutes: 28 Minutes  Minutes: 28          Date: 12/3/2024  Patient Name: Ousmane Lockett,   Gender: male      Room: -38/038-A  MRN: [id number]  : 1965  (59 y.o.)  Referring Practitioner: Terri Regalado MD  Diagnosis: Pneumonia due to infectious organism  Additional Pertinent Hx: Per H & P on 2024:59 y.o. male with a history of metastatic lung cancer, CVA, HTN, HLD who presents to the emergency department from home for evaluation of fever and malaise.  Patient diagnosed with non-small cell cancer earlier this year, currently has mets to the shoulder and questionable mets to the adrenal gland and spleen.  Patient has gone through 2 immunotherapy and the started chemotherapy just 3 days ago.  Patient had temperature 100.5 last night and 102.5 this morning.  Patient reports feeling very fatigued, with no appetite and diffuse bodyaches.  Patient denies any chest pain, abdominal pain, dyspnea, fever, rash.  Patient has a Pleurx catheter on the right, his wife drained it last night reports no change in the color, reports the color is yellow is draining easily. Per OP OT notes:  Patient underwent right reverse shoulder replacement with radical resection of proximal humerus on 24. Patient reports that they removed 4\" of his proximal humerus.Pt was found to have metastatic bone cancer in right proximal humerus    Restrictions/Precautions:  Restrictions/Precautions: Fall Risk  Position Activity Restriction  Other position/activity restrictions: OP OT orders as of 2024:OK for AAROM for forward flexion and gentle ER, no active ROM, ok for PROM, no IR,  increased time for completion. W/ use of GBS .    IADL:   Not Tested    BALANCE:  Sitting Balance:  Supervision.    Standing Balance: Supervision, Stand By Assistance.    Pt tolerated ~8 min(s) of dynamic sitting without LOB to facilitate improved activity tolerance, balance, and ADL participation.     BED MOBILITY:  Rolling to Left: Supervision, with rail    Supine to Sit: Supervision, with head of bed raised, with rail, with increased time for completion      TRANSFERS:  Sit to Stand:  Stand By Assistance, with increased time for completion.   Stand to Sit: Stand By Assistance, with increased time for completion.     FUNCTIONAL MOBILITY:  Assistive Device: Rolling Walker  Assist Level:  Stand By Assistance and Contact Guard Assistance. (SBA overall w/ 2x instances of CGA w/ LOBs, pt self corrected)  Distance: Pt participated in functional mobility at community distance via RW,  2x LOB(s) self-corrected, with 2 self initiated standing rest break(s) to facilitate improved activity tolerance and balance for increased ADL participation.      AM-[facility] Inpatient Daily Activity Raw Score: 20  AM-[facility] Inpatient ADL T-Scale Score : 42.03  ADL Inpatient CMS 0-100% Score: 38.32    Modified Efren Scale:  Not Applicable    ASSESSMENT:     Activity Tolerance:  Patient tolerance of  treatment: Fair treatment tolerance      Plan: Times Per Week: 5x  Current Treatment Recommendations: Functional mobility training, Balance training, Self-Care / ADL, Safety education & training    Education:  Learners: Patient  Role of OT, Plan of Care, ADL's, Energy Conservation, Precautions, Equipment Education, and Reviewed Prior Education    Goals  Short Term Goals  Time Frame for Short Term Goals: until discharge  Short Term Goal 1: Pt will complete various t/fs including toilet with S & 0 vcs for safety  Short Term Goal 2: Pt will complete mobility to/from bathroom + with ADL item retrieval with S & 0 vcs for safety & energy conservation/RBs

## 2024-12-04 LAB
ANION GAP SERPL CALC-SCNC: 12 MEQ/L (ref 8–16)
ANISOCYTOSIS BLD QL SMEAR: PRESENT
BASOPHILS ABSOLUTE: 0 THOU/MM3 (ref 0–0.1)
BASOPHILS NFR BLD AUTO: 0.4 %
BUN SERPL-MCNC: 14 MG/DL (ref 7–22)
CALCIUM SERPL-MCNC: 7.8 MG/DL (ref 8.5–10.5)
CHLORIDE SERPL-SCNC: 100 MEQ/L (ref 98–111)
CO2 SERPL-SCNC: 21 MEQ/L (ref 23–33)
CREAT SERPL-MCNC: 0.4 MG/DL (ref 0.4–1.2)
DEPRECATED RDW RBC AUTO: 45.1 FL (ref 35–45)
ELLIPTOCYTES: ABNORMAL
EOSINOPHIL NFR BLD AUTO: 0.9 %
EOSINOPHILS ABSOLUTE: 0 THOU/MM3 (ref 0–0.4)
ERYTHROCYTE [DISTWIDTH] IN BLOOD BY AUTOMATED COUNT: 17.5 % (ref 11.5–14.5)
GFR SERPL CREATININE-BSD FRML MDRD: > 90 ML/MIN/1.73M2
GLUCOSE SERPL-MCNC: 92 MG/DL (ref 70–108)
HCT VFR BLD AUTO: 24.7 % (ref 42–52)
HGB BLD-MCNC: 7.4 GM/DL (ref 14–18)
HYPOCHROMIA BLD QL SMEAR: PRESENT
IMM GRANULOCYTES # BLD AUTO: 0.09 THOU/MM3 (ref 0–0.07)
IMM GRANULOCYTES NFR BLD AUTO: 4 %
LYMPHOCYTES ABSOLUTE: 1.2 THOU/MM3 (ref 1–4.8)
LYMPHOCYTES NFR BLD AUTO: 53.5 %
MCH RBC QN AUTO: 21.6 PG (ref 26–33)
MCHC RBC AUTO-ENTMCNC: 30 GM/DL (ref 32.2–35.5)
MCV RBC AUTO: 72.2 FL (ref 80–94)
MONOCYTES ABSOLUTE: 0.2 THOU/MM3 (ref 0.4–1.3)
MONOCYTES NFR BLD AUTO: 10.6 %
NEUTROPHILS ABSOLUTE: 0.7 THOU/MM3 (ref 1.8–7.7)
NEUTROPHILS NFR BLD AUTO: 30.6 %
NRBC BLD AUTO-RTO: 0 /100 WBC
PATHOLOGIST REVIEW: ABNORMAL
PLATELET # BLD AUTO: 175 THOU/MM3 (ref 130–400)
PLATELET BLD QL SMEAR: ADEQUATE
PMV BLD AUTO: 9.7 FL (ref 9.4–12.4)
POIKILOCYTES: ABNORMAL
POTASSIUM SERPL-SCNC: 3.8 MEQ/L (ref 3.5–5.2)
RBC # BLD AUTO: 3.42 MILL/MM3 (ref 4.7–6.1)
SCAN OF BLOOD SMEAR: NORMAL
SCHISTOCYTES BLD QL SMEAR: ABNORMAL
SIROLIMUS BLD-MCNC: 2.4 NG/ML
SODIUM SERPL-SCNC: 133 MEQ/L (ref 135–145)
WBC # BLD AUTO: 2.3 THOU/MM3 (ref 4.8–10.8)

## 2024-12-04 PROCEDURE — 99232 SBSQ HOSP IP/OBS MODERATE 35: CPT | Performed by: STUDENT IN AN ORGANIZED HEALTH CARE EDUCATION/TRAINING PROGRAM

## 2024-12-04 PROCEDURE — 6370000000 HC RX 637 (ALT 250 FOR IP): Performed by: OPHTHALMOLOGY

## 2024-12-04 PROCEDURE — 80048 BASIC METABOLIC PNL TOTAL CA: CPT

## 2024-12-04 PROCEDURE — 6370000000 HC RX 637 (ALT 250 FOR IP)

## 2024-12-04 PROCEDURE — 97530 THERAPEUTIC ACTIVITIES: CPT

## 2024-12-04 PROCEDURE — 2140000000 HC CCU INTERMEDIATE R&B

## 2024-12-04 PROCEDURE — 6360000002 HC RX W HCPCS

## 2024-12-04 PROCEDURE — 85025 COMPLETE CBC W/AUTO DIFF WBC: CPT

## 2024-12-04 PROCEDURE — 97110 THERAPEUTIC EXERCISES: CPT

## 2024-12-04 PROCEDURE — 99232 SBSQ HOSP IP/OBS MODERATE 35: CPT | Performed by: INTERNAL MEDICINE

## 2024-12-04 PROCEDURE — 97116 GAIT TRAINING THERAPY: CPT

## 2024-12-04 PROCEDURE — 6370000000 HC RX 637 (ALT 250 FOR IP): Performed by: NURSE PRACTITIONER

## 2024-12-04 PROCEDURE — 6370000000 HC RX 637 (ALT 250 FOR IP): Performed by: INTERNAL MEDICINE

## 2024-12-04 PROCEDURE — 2580000003 HC RX 258

## 2024-12-04 PROCEDURE — 36415 COLL VENOUS BLD VENIPUNCTURE: CPT

## 2024-12-04 RX ORDER — FLUCONAZOLE 200 MG/1
200 TABLET ORAL ONCE
Status: COMPLETED | OUTPATIENT
Start: 2024-12-04 | End: 2024-12-04

## 2024-12-04 RX ADMIN — ACETAMINOPHEN 650 MG: 325 TABLET ORAL at 15:21

## 2024-12-04 RX ADMIN — ENOXAPARIN SODIUM 90 MG: 100 INJECTION SUBCUTANEOUS at 08:59

## 2024-12-04 RX ADMIN — OXYCODONE HYDROCHLORIDE 5 MG: 5 TABLET ORAL at 11:11

## 2024-12-04 RX ADMIN — FLUCONAZOLE 200 MG: 200 TABLET ORAL at 14:50

## 2024-12-04 RX ADMIN — CEFEPIME 2000 MG: 2 INJECTION, POWDER, FOR SOLUTION INTRAVENOUS at 21:30

## 2024-12-04 RX ADMIN — PANTOPRAZOLE SODIUM 40 MG: 40 TABLET, DELAYED RELEASE ORAL at 05:55

## 2024-12-04 RX ADMIN — SIROLIMUS 1 MG: 1 TABLET ORAL at 08:59

## 2024-12-04 RX ADMIN — PREDNISONE 10 MG: 20 TABLET ORAL at 08:58

## 2024-12-04 RX ADMIN — FERROUS SULFATE TAB 325 MG (65 MG ELEMENTAL FE) 325 MG: 325 (65 FE) TAB at 17:30

## 2024-12-04 RX ADMIN — CEFEPIME 2000 MG: 2 INJECTION, POWDER, FOR SOLUTION INTRAVENOUS at 14:53

## 2024-12-04 RX ADMIN — FERROUS SULFATE TAB 325 MG (65 MG ELEMENTAL FE) 325 MG: 325 (65 FE) TAB at 08:59

## 2024-12-04 RX ADMIN — CEFEPIME 2000 MG: 2 INJECTION, POWDER, FOR SOLUTION INTRAVENOUS at 05:57

## 2024-12-04 RX ADMIN — OXYCODONE HYDROCHLORIDE 5 MG: 5 TABLET ORAL at 20:14

## 2024-12-04 RX ADMIN — METOPROLOL TARTRATE 25 MG: 25 TABLET, FILM COATED ORAL at 20:15

## 2024-12-04 RX ADMIN — APIXABAN 5 MG: 5 TABLET, FILM COATED ORAL at 20:15

## 2024-12-04 RX ADMIN — METOPROLOL TARTRATE 25 MG: 25 TABLET, FILM COATED ORAL at 08:59

## 2024-12-04 RX ADMIN — PHENOL 1 SPRAY: 1.5 LIQUID ORAL at 05:55

## 2024-12-04 ASSESSMENT — PAIN - FUNCTIONAL ASSESSMENT
PAIN_FUNCTIONAL_ASSESSMENT: ACTIVITIES ARE NOT PREVENTED
PAIN_FUNCTIONAL_ASSESSMENT: PREVENTS OR INTERFERES SOME ACTIVE ACTIVITIES AND ADLS
PAIN_FUNCTIONAL_ASSESSMENT: PREVENTS OR INTERFERES SOME ACTIVE ACTIVITIES AND ADLS

## 2024-12-04 ASSESSMENT — PAIN DESCRIPTION - DESCRIPTORS
DESCRIPTORS: SHARP
DESCRIPTORS: SHARP
DESCRIPTORS: ACHING

## 2024-12-04 ASSESSMENT — PAIN SCALES - GENERAL
PAINLEVEL_OUTOF10: 2
PAINLEVEL_OUTOF10: 5
PAINLEVEL_OUTOF10: 2
PAINLEVEL_OUTOF10: 0
PAINLEVEL_OUTOF10: 4
PAINLEVEL_OUTOF10: 4
PAINLEVEL_OUTOF10: 3

## 2024-12-04 ASSESSMENT — PAIN SCALES - WONG BAKER: WONGBAKER_NUMERICALRESPONSE: NO HURT

## 2024-12-04 ASSESSMENT — PAIN DESCRIPTION - LOCATION
LOCATION: BACK

## 2024-12-04 ASSESSMENT — PAIN DESCRIPTION - ORIENTATION
ORIENTATION: MID

## 2024-12-04 NOTE — PROGRESS NOTES
Hospitalist Progress Note    Patient:  Ousmane Lockett    Unit/Bed:3B-38/038-A  YOB: 1965  MRN: [id number]   Acct: [id number]   PCP: Daniel Barbosa MD  Date of Admission: 11/30/2024    ASSESSMENT AND PLAN  Active Problems  #Sepsis due to pneumonia:   -Met 3/4 SIRS criteria upon admission- febrile, tachycardia, acute leukocytosis. Improved.   -CXR reveals mild PNA of RML  -Started on Cefepime, Azithromycin (11/30).   -Respiratory culture, PNA panel ordered.   -1 of 2 BC obtained upon arrival positive for coag negative staph bacteremia.  - ID consulted today. Repeat BC x 2 ordered today. Continue to follow at this time, suspect contamination.     #New onset atrial fibrillation with RVR: No rate controlled- NSR. Cardiology consulted.   -ECHO  12/2 reveals EF 55-60% with mildly abnormal strain pattern. Discussed with WILEY Sanchez with Cardiology- will order CTA chest as pt is endorsing significant dyspnea with minimal exertion. Bilateral venous dopplers ordered.    -Continue with Lopressor.Will stat on anticoagulation with eliquis as patient agrees to take it.      #Acute on chronic microcytic anemia: Stable.   -Anemia work up reveals iron deficiency, suspected elevated ferritin in acute phase given #1.   -No overt signs of bleeding. Monitor CBC. Start ferrous sulfate.     Chronic Conditions (reviewed and stable unless otherwise stated):   #NSCLC:   -Follows with the [facility] at [facility] for oncology.    -Diagnosed earlier this year. Patient was switched to chemotherapy instead of immunotherapy , s/p 2 doses.  First dose of chemotherapy was on Wednesday of this week, 11/27.  Current chemotherapy includes carboplatin and pemetrexed.      #Hx of renal transplant: In 2000 at [facility]. Currently on Sirolimus and prednisone 10 mg. Cr 0.7 on admission.      DVT Prophylaxis: [] Lovenox / [] Heparin / [x] SCDs / [] Already on Systemic  Anticoagulation / [] None  LDA: []CVC / []PICC / []Midline / []Sibley / []Drains / []Mediport / [x]None  Antibiotics: yes  Steroids: yes  Labs (still needed?): [x]Yes / []No  IVF (still needed?): []Yes / [x]No    Level of care: [x]Step Down / []Med-Surg  Bed Status: [x]Inpatient / []Observation  Telemetry: [x]Yes / []No  PT/OT: [x]Yes / []No      Code status: Full Code     ===================================================================  Chief Complaint: \"Fever\"  Initial HPI: History obtained per patient and chart review.   \"Ousmane Lockett is a 59 y.o. male with PMHx of NSCLC, Hx of renal transplant who presents to [facility] with fever after undergoing first dose of chemotherapy earlier this week.  Patient reports that he has been having a sore throat and had increased fatigue that began on Friday.  Patient did also have a low-grade fever on Friday at home approximately 29 °F.  Patient then had fever of 102.4 °F morning of admission.  Patient and his family called oncology at [facility] that recommended local ED presentation.  Patient denies a cough.  Patient denies any chest pains, palpitations, N/V/C/D.  Patient not requiring any supplemental oxygen and is on any supplemental oxygen at home.  Patient just overall feels fatigued and unwell.  Patient having both fevers and chills.  No increased sputum production.     ED course: Initial vitals include temperature 98.8°F however axillary repeat 102.2 °F, respiratory rate 16, heart rate 116, /57, SpO2 97% on room air.  Pertinent initial labs include WBC 14.6.  CXR shows infiltrate and effusion at the right lung base.  Patient given cefepime and 1 L NS bolus in the ED.\"       12/2: Pt resting in recliner, appears acutely ill and fatigued. No acute events overnight. Reports cough to be non-productive. Reports Heart burn sensation, reports frequent hiccups.     Subjective (past 24 hours):    12/3: Pt seen and examined at bed side.Reports no

## 2024-12-04 NOTE — CARE COORDINATION
12/4/24, 7:59 AM EST    DISCHARGE ON GOING EVALUATION    [facility] day: 4  Location: [facility]/038-A Reason for admit: History of lung cancer [Z85.118]  Pneumonia of right lower lobe due to infectious organism [J18.9]  Pneumonia due to infectious organism [J18.9]  Sepsis without acute organ dysfunction, due to unspecified organism (HCC) [A41.9]     Procedures:   12/2 Echo: EF 55-60%.     Imaging since last note:   12/3 Venous doppler BLE: No evidence of a DVT.   12/4 CTA chest:   1. Mild suboptimal study. No acute pulmonary embolus.  2. New right perihilar soft tissue density up to 2.7 cm. Differential   diagnosis include posttreatment change, recurrent neoplastic disease.   Recommend follow-up with PET-CT.  3. Lingular nodule up to 0.5-0.6 cm, new since previous study,   indeterminate due to small size. Attention follow-up.  4. Chronic severe left brachiocephalic stenosis, with collateralization of   venous flow, similar to previous study.  5. No consolidative pneumonia.  6. Small right pleural effusion, decreased since previous study. Right   PleurX catheter, new since prior study.  7. Questionable new hepatic lesion since previous study. Recommend   follow-up with PET-CT.    Barriers to Discharge: Hospitalist and ID following. PT/OT. Dietitian. Hgb 7.4. Blood culture #2: Staph (coag negative). Repeat cultures: no growth to date. Cefepime iv q8hr. PleurX drain in place, large amount out.     PCP: Daniel Barbosa MD  Readmission Risk Score: 22.5    Patient Goals/Plan/Treatment Preferences: Plan home with wife. Has PleurX drain, wife manages. Current with OP PT at the  in [place]. Monitor for needs.

## 2024-12-04 NOTE — PLAN OF CARE
Problem: Chronic Conditions and Co-morbidities  Goal: Patient's chronic conditions and co-morbidity symptoms are monitored and maintained or improved  12/4/2024 0154 by Geena Richardson RN  Outcome: Progressing  Flowsheets (Taken 12/4/2024 0154)  Care Plan - Patient's Chronic Conditions and Co-Morbidity Symptoms are Monitored and Maintained or Improved:   Monitor and assess patient's chronic conditions and comorbid symptoms for stability, deterioration, or improvement   Collaborate with multidisciplinary team to address chronic and comorbid conditions and prevent exacerbation or deterioration   Update acute care plan with appropriate goals if chronic or comorbid symptoms are exacerbated and prevent overall improvement and discharge     Problem: Pain  Goal: Verbalizes/displays adequate comfort level or baseline comfort level  12/4/2024 0154 by Geena Richardson RN  Outcome: Progressing  Flowsheets (Taken 12/4/2024 0154)  Verbalizes/displays adequate comfort level or baseline comfort level:   Encourage patient to monitor pain and request assistance   Administer analgesics based on type and severity of pain and evaluate response   Consider cultural and social influences on pain and pain management   Assess pain using appropriate pain scale   Implement non-pharmacological measures as appropriate and evaluate response  Note: Ongoing assessment & interventions provided throughout shift.  Reminded patient to report any pain, pressure, or shortness of breath to the nurse.  Pain medications provided per physician's orders.      Problem: Skin/Tissue Integrity  Goal: Absence of new skin breakdown  Description: 1.  Monitor for areas of redness and/or skin breakdown  2.  Assess vascular access sites hourly  3.  Every 4-6 hours minimum:  Change oxygen saturation probe site  4.  Every 4-6 hours:  If on nasal continuous positive airway pressure, respiratory therapy assess nares and determine need for appliance change or resting

## 2024-12-04 NOTE — PROGRESS NOTES
[facility]  INPATIENT PHYSICAL THERAPY  DAILY NOTE  [facility] CCU-STEPDOWN 3B - 3B-38/038-A      Discharge Recommendations:  Home with 24 hour assist/supervision with HH PT initially, then transition back to OP PT   Equipment Recommendations: Yes (may need RW, will continue as assess)             Time In: 1108  Time Out: 1146  Timed Code Treatment Minutes: 38 Minutes  Minutes: 38        Date: 2024  Patient Name: Ousmane Lockett,  Gender:  male        MRN: [id number]  : 1965  (59 y.o.)     Referring Practitioner: Terri Regalado MD  Diagnosis: Pneumonia due to infectious organism  Additional Pertinent Hx: Per H & P on 2024:59 y.o. male with a history of metastatic lung cancer, CVA, HTN, HLD who presents to the emergency department from home for evaluation of fever and malaise.  Patient diagnosed with non-small cell cancer earlier this year, currently has mets to the shoulder and questionable mets to the adrenal gland and spleen.  Patient has gone through 2 immunotherapy and the started chemotherapy just 3 days ago.  Patient had temperature 100.5 last night and 102.5 this morning.  Patient reports feeling very fatigued, with no appetite and diffuse bodyaches.  Patient denies any chest pain, abdominal pain, dyspnea, fever, rash.  Patient has a Pleurx catheter on the right, his wife drained it last night reports no change in the color, reports the color is yellow is draining easily. Per OP OT notes:  Patient underwent right reverse shoulder replacement with radical resection of proximal humerus on 24. Patient reports that they removed 4\" of his proximal humerus.Pt was found to have metastatic bone cancer in right proximal humerus     Prior Level of Function:  Lives With: Spouse  Type of Home: House  Home Layout: Two level (office is on 2nd level)  Home Access: Stairs to enter with rails  Entrance Stairs - Number of Steps: 2 LAYO with 1 handrail. full flight up to 2nd level with

## 2024-12-04 NOTE — PROGRESS NOTES
[facility] Infectious Disease         Progress Note      Ousmane Lockett  MEDICAL RECORD NUMBER:  [id number]  AGE: 59 y.o.   GENDER: male  : 1965  EPISODE DATE:  2024      Assessment:     Patient is a 59-year-old male who does have a history significant for non-small cell lung cancer and prior renal transplant who I am consulted for positive blood culture with Staph epidermidis.     Patient is receiving chemotherapy and developed fever on day of admission.  Blood cultures positive 1 out of 2 with staph epi.  While on therapy with cefepime and azithromycin for potential pneumonia, he has had improvement in fever curve and leukocytosis.  This suggest that this was more than likely contamination and I would defer treatment at this time, repeat blood cultures obtained on .     Repeat blood cultures remain in process, obtained on  at 9 AM  Remains on therapy with cefepime in setting of chemotherapy and potential lower respiratory tract infection  Has completed 5 days of cefepime for neutropenia/pneumonia  -End of therapy   Not currently on therapy for coagulase-negative staph from blood   C/w contamination      Subjective:     Chief Complaint   Patient presents with    Malaise    Fever         No acute events overnight.  Discussed with nursing regarding fever curve.  No complaints or concerns this a.m.      Patient Active Problem List   Diagnosis Code    Sprain of ligament of lumbosacral joint S33.9XXA    Sprain of neck S13.9XXA    Thoracic sprain and strain [id number]    CVA (cerebral vascular accident) ([facility]) I63.9    History of renal transplant Z94.0    History of non-Hodgkin lymphoma Z85.72    Hyperlipidemia E78.5    Hypertension I10    Biliary colic K80.50    Immunosuppressed status ([facility]) D84.9    Metastatic carcinoma involving bone with unknown primary site ([facility]) C79.51, C80.1    Primary malignant neoplasm of right lower lobe of lung ([facility]) C34.31    Recurrent    Substance Use Topics    Alcohol use: Not Currently     Alcohol/week: 1.0 standard drink of alcohol     Types: 1 Shots of liquor per week     Comment: very rare use.     Drug use: No       ALLERGIES    No Known Allergies    MEDICATIONS    No current facility-administered medications on file prior to encounter.     Current Outpatient Medications on File Prior to Encounter   Medication Sig Dispense Refill    sirolimus (RAPAMUNE) 1 MG tablet Take 1 tablet by mouth daily      oxyCODONE (ROXICODONE) 5 MG immediate release tablet Take 1 tablet by mouth every 6 hours as needed.      polycarbophil (FIBERCON) 625 MG tablet Take 1 tablet by mouth daily      Cholecalciferol (VITAMIN D3) 1.25 MG (30440 UT) CAPS Take by mouth      furosemide (LASIX) 20 MG tablet Take 1 tablet by mouth daily (Patient taking differently: Take 1 tablet by mouth 2 times daily) 90 tablet 4    Misc Natural Products (GLUCOSAMINE CHOND COMPLEX/MSM PO) Take  by mouth.      Ascorbic Acid (VITAMIN C) 500 MG tablet Take 2 tablets by mouth daily      predniSONE (DELTASONE) 10 MG tablet TAKE 1 TABLET DAILY 90 tablet 1    acyclovir (ZOVIRAX) 200 MG capsule Take by mouth as needed         REVIEW OF SYSTEMS    Constitutional: no fever, no night sweats, no fatigue.  Head: no head ache , no head injury, no migranes.  Eye: no blurring of vision, no double vision.  Ears: no hearing difficulty, no tinnitus  Mouth/throat: no ulceration, dental caries , dysphagia  Lungs: no cough no chest pain  CVS: no palpitation, no chest pain, no shortness of breath  GI: no abdominal pain, no nausea , no vomiting, no constipation  KRISHNA: no dysuria, frequency and urgency, no hematuria, no kidney stones  Musculoskeletal: no joint pain, swelling , stiffness,  Endocrine: no polyuria, polydipsia, no cold or heat intolerance  Hematology: no anemia, no easy brusing or bleeding, no hx of clotting disorder  Dermatology: no skin rash, no eczema, no pruritis,  Psychiatry: no depression, no  AM    BC No growth 24 hours. 12/02/2024 09:32 AM      Hemoglobin A1C: No results found for: \"LABA1C\"        Electronically signed by Graeme Alas MD TD@ at 5:54 AM

## 2024-12-05 LAB
ANION GAP SERPL CALC-SCNC: 9 MEQ/L (ref 8–16)
BACTERIA BLD AEROBE CULT: NORMAL
BACTERIA SPEC ANAEROBE CULT: NORMAL
BACTERIA SPEC BFLD CULT: NORMAL
BASOPHILS ABSOLUTE: 0 THOU/MM3 (ref 0–0.1)
BASOPHILS NFR BLD AUTO: 0 %
BUN SERPL-MCNC: 12 MG/DL (ref 7–22)
CALCIUM SERPL-MCNC: 8 MG/DL (ref 8.5–10.5)
CHLORIDE SERPL-SCNC: 97 MEQ/L (ref 98–111)
CO2 SERPL-SCNC: 23 MEQ/L (ref 23–33)
CREAT SERPL-MCNC: 0.5 MG/DL (ref 0.4–1.2)
DEPRECATED RDW RBC AUTO: 47.1 FL (ref 35–45)
EOSINOPHIL NFR BLD AUTO: 0.9 %
EOSINOPHILS ABSOLUTE: 0 THOU/MM3 (ref 0–0.4)
ERYTHROCYTE [DISTWIDTH] IN BLOOD BY AUTOMATED COUNT: 17.6 % (ref 11.5–14.5)
GFR SERPL CREATININE-BSD FRML MDRD: > 90 ML/MIN/1.73M2
GLUCOSE SERPL-MCNC: 91 MG/DL (ref 70–108)
GRAM STN SPEC: NORMAL
HCT VFR BLD AUTO: 27 % (ref 42–52)
HGB BLD-MCNC: 7.7 GM/DL (ref 14–18)
HYPOCHROMIA BLD QL SMEAR: PRESENT
IMM GRANULOCYTES # BLD AUTO: 0.02 THOU/MM3 (ref 0–0.07)
IMM GRANULOCYTES NFR BLD AUTO: 0.9 %
LYMPHOCYTES ABSOLUTE: 1.2 THOU/MM3 (ref 1–4.8)
LYMPHOCYTES NFR BLD AUTO: 53 %
MCH RBC QN AUTO: 21.2 PG (ref 26–33)
MCHC RBC AUTO-ENTMCNC: 28.5 GM/DL (ref 32.2–35.5)
MCV RBC AUTO: 74.4 FL (ref 80–94)
MONOCYTES ABSOLUTE: 0.2 THOU/MM3 (ref 0.4–1.3)
MONOCYTES NFR BLD AUTO: 9 %
NEUTROPHILS ABSOLUTE: 0.8 THOU/MM3 (ref 1.8–7.7)
NEUTROPHILS NFR BLD AUTO: 36.2 %
NRBC BLD AUTO-RTO: 0 /100 WBC
PLATELET # BLD AUTO: 167 THOU/MM3 (ref 130–400)
PMV BLD AUTO: 10 FL (ref 9.4–12.4)
POTASSIUM SERPL-SCNC: 3.7 MEQ/L (ref 3.5–5.2)
RBC # BLD AUTO: 3.63 MILL/MM3 (ref 4.7–6.1)
SODIUM SERPL-SCNC: 129 MEQ/L (ref 135–145)
WBC # BLD AUTO: 2.3 THOU/MM3 (ref 4.8–10.8)

## 2024-12-05 PROCEDURE — 6370000000 HC RX 637 (ALT 250 FOR IP): Performed by: INTERNAL MEDICINE

## 2024-12-05 PROCEDURE — 99232 SBSQ HOSP IP/OBS MODERATE 35: CPT | Performed by: STUDENT IN AN ORGANIZED HEALTH CARE EDUCATION/TRAINING PROGRAM

## 2024-12-05 PROCEDURE — 2580000003 HC RX 258

## 2024-12-05 PROCEDURE — 2140000000 HC CCU INTERMEDIATE R&B

## 2024-12-05 PROCEDURE — 97116 GAIT TRAINING THERAPY: CPT

## 2024-12-05 PROCEDURE — 6370000000 HC RX 637 (ALT 250 FOR IP): Performed by: NURSE PRACTITIONER

## 2024-12-05 PROCEDURE — 97530 THERAPEUTIC ACTIVITIES: CPT

## 2024-12-05 PROCEDURE — 85025 COMPLETE CBC W/AUTO DIFF WBC: CPT

## 2024-12-05 PROCEDURE — 99232 SBSQ HOSP IP/OBS MODERATE 35: CPT | Performed by: INTERNAL MEDICINE

## 2024-12-05 PROCEDURE — 80048 BASIC METABOLIC PNL TOTAL CA: CPT

## 2024-12-05 PROCEDURE — 6370000000 HC RX 637 (ALT 250 FOR IP)

## 2024-12-05 PROCEDURE — 36415 COLL VENOUS BLD VENIPUNCTURE: CPT

## 2024-12-05 PROCEDURE — 6360000002 HC RX W HCPCS

## 2024-12-05 PROCEDURE — 6370000000 HC RX 637 (ALT 250 FOR IP): Performed by: OPHTHALMOLOGY

## 2024-12-05 RX ADMIN — OXYCODONE HYDROCHLORIDE 5 MG: 5 TABLET ORAL at 17:22

## 2024-12-05 RX ADMIN — CEFEPIME 2000 MG: 2 INJECTION, POWDER, FOR SOLUTION INTRAVENOUS at 14:10

## 2024-12-05 RX ADMIN — APIXABAN 5 MG: 5 TABLET, FILM COATED ORAL at 08:25

## 2024-12-05 RX ADMIN — CEFEPIME 2000 MG: 2 INJECTION, POWDER, FOR SOLUTION INTRAVENOUS at 21:53

## 2024-12-05 RX ADMIN — FERROUS SULFATE TAB 325 MG (65 MG ELEMENTAL FE) 325 MG: 325 (65 FE) TAB at 17:22

## 2024-12-05 RX ADMIN — METOPROLOL TARTRATE 25 MG: 25 TABLET, FILM COATED ORAL at 20:03

## 2024-12-05 RX ADMIN — Medication 5 ML: at 18:38

## 2024-12-05 RX ADMIN — ACETAMINOPHEN 650 MG: 325 TABLET ORAL at 18:42

## 2024-12-05 RX ADMIN — CEFEPIME 2000 MG: 2 INJECTION, POWDER, FOR SOLUTION INTRAVENOUS at 06:17

## 2024-12-05 RX ADMIN — APIXABAN 5 MG: 5 TABLET, FILM COATED ORAL at 20:03

## 2024-12-05 RX ADMIN — METOPROLOL TARTRATE 25 MG: 25 TABLET, FILM COATED ORAL at 08:25

## 2024-12-05 RX ADMIN — Medication 5 ML: at 14:07

## 2024-12-05 RX ADMIN — PREDNISONE 10 MG: 20 TABLET ORAL at 08:25

## 2024-12-05 RX ADMIN — SODIUM CHLORIDE, PRESERVATIVE FREE 10 ML: 5 INJECTION INTRAVENOUS at 20:03

## 2024-12-05 RX ADMIN — SIROLIMUS 1 MG: 1 TABLET ORAL at 08:25

## 2024-12-05 RX ADMIN — PANTOPRAZOLE SODIUM 40 MG: 40 TABLET, DELAYED RELEASE ORAL at 05:01

## 2024-12-05 RX ADMIN — Medication 5 ML: at 10:09

## 2024-12-05 RX ADMIN — ACETAMINOPHEN 650 MG: 325 TABLET ORAL at 04:30

## 2024-12-05 RX ADMIN — PHENOL 1 SPRAY: 1.5 LIQUID ORAL at 08:28

## 2024-12-05 RX ADMIN — FERROUS SULFATE TAB 325 MG (65 MG ELEMENTAL FE) 325 MG: 325 (65 FE) TAB at 08:25

## 2024-12-05 ASSESSMENT — PAIN DESCRIPTION - LOCATION
LOCATION: THROAT
LOCATION: THROAT;BACK

## 2024-12-05 ASSESSMENT — PAIN DESCRIPTION - ONSET
ONSET: ON-GOING
ONSET: ON-GOING

## 2024-12-05 ASSESSMENT — PAIN - FUNCTIONAL ASSESSMENT: PAIN_FUNCTIONAL_ASSESSMENT: ACTIVITIES ARE NOT PREVENTED

## 2024-12-05 ASSESSMENT — PAIN DESCRIPTION - ORIENTATION: ORIENTATION: LOWER

## 2024-12-05 ASSESSMENT — PAIN SCALES - GENERAL
PAINLEVEL_OUTOF10: 5
PAINLEVEL_OUTOF10: 6
PAINLEVEL_OUTOF10: 6
PAINLEVEL_OUTOF10: 4
PAINLEVEL_OUTOF10: 4

## 2024-12-05 ASSESSMENT — PAIN DESCRIPTION - DESCRIPTORS
DESCRIPTORS: SORE
DESCRIPTORS: ACHING;SORE
DESCRIPTORS: SORE

## 2024-12-05 ASSESSMENT — PAIN DESCRIPTION - FREQUENCY
FREQUENCY: CONTINUOUS
FREQUENCY: CONTINUOUS

## 2024-12-05 ASSESSMENT — PAIN DESCRIPTION - PAIN TYPE: TYPE: ACUTE PAIN

## 2024-12-05 NOTE — PROGRESS NOTES
[facility] Infectious Disease         Progress Note      Ousmane Lockett  MEDICAL RECORD NUMBER:  [id number]  AGE: 59 y.o.   GENDER: male  : 1965  EPISODE DATE:  2024      Assessment:     Patient is a 59-year-old male who does have a history significant for non-small cell lung cancer and prior renal transplant who I am consulted for positive blood culture with Staph epidermidis.     Patient is receiving chemotherapy and developed fever on day of admission.  Blood cultures positive 1 out of 2 with staph epi.  While on therapy with cefepime and azithromycin for potential pneumonia, he has had improvement in fever curve and leukocytosis.  This suggest that this was more than likely contamination and I would defer treatment at this time, repeat blood cultures obtained on .     Repeat blood cultures remain in process, obtained on  at 9 AM  Remains on therapy with cefepime in setting of chemotherapy and potential lower respiratory tract infection  Continue cefepime  -End of therapy         Subjective:     Chief Complaint   Patient presents with    Malaise    Fever         No acute events overnight.  No complaints or concerns this a.m.      Patient Active Problem List   Diagnosis Code    Sprain of ligament of lumbosacral joint S33.9XXA    Sprain of neck S13.9XXA    Thoracic sprain and strain [id number]    CVA (cerebral vascular accident) ([facility]) I63.9    History of renal transplant Z94.0    History of non-Hodgkin lymphoma Z85.72    Hyperlipidemia E78.5    Hypertension I10    Biliary colic K80.50    Immunosuppressed status (HCC) D84.9    Metastatic carcinoma involving bone with unknown primary site (HCC) C79.51, C80.1    Primary malignant neoplasm of right lower lobe of lung (HCC) C34.31    Recurrent right pleural effusion J90    History of lung cancer Z85.118    Abnormal x-ray R93.89    Metastatic non-small cell lung cancer (HCC) C34.90    Stenosis of subclavian vein     Drug use: No       ALLERGIES    No Known Allergies    MEDICATIONS    No current facility-administered medications on file prior to encounter.     Current Outpatient Medications on File Prior to Encounter   Medication Sig Dispense Refill    sirolimus (RAPAMUNE) 1 MG tablet Take 1 tablet by mouth daily      oxyCODONE (ROXICODONE) 5 MG immediate release tablet Take 1 tablet by mouth every 6 hours as needed.      polycarbophil (FIBERCON) 625 MG tablet Take 1 tablet by mouth daily      Cholecalciferol (VITAMIN D3) 1.25 MG (13491 UT) CAPS Take by mouth      furosemide (LASIX) 20 MG tablet Take 1 tablet by mouth daily (Patient taking differently: Take 1 tablet by mouth 2 times daily) 90 tablet 4    Misc Natural Products (GLUCOSAMINE CHOND COMPLEX/MSM PO) Take  by mouth.      Ascorbic Acid (VITAMIN C) 500 MG tablet Take 2 tablets by mouth daily      predniSONE (DELTASONE) 10 MG tablet TAKE 1 TABLET DAILY 90 tablet 1    acyclovir (ZOVIRAX) 200 MG capsule Take by mouth as needed         REVIEW OF SYSTEMS    Constitutional: no fever, no night sweats, no fatigue.  Head: no head ache , no head injury, no migranes.  Eye: no blurring of vision, no double vision.  Ears: no hearing difficulty, no tinnitus  Mouth/throat: no ulceration, dental caries , dysphagia  Lungs: no cough no chest pain  CVS: no palpitation, no chest pain, no shortness of breath  GI: no abdominal pain, no nausea , no vomiting, no constipation  KRISHNA: no dysuria, frequency and urgency, no hematuria, no kidney stones  Musculoskeletal: no joint pain, swelling , stiffness,  Endocrine: no polyuria, polydipsia, no cold or heat intolerance  Hematology: no anemia, no easy brusing or bleeding, no hx of clotting disorder  Dermatology: no skin rash, no eczema, no pruritis,  Psychiatry: no depression, no anxiety,no panic attacks, no suicide ideation      Objective:      /64   Pulse (!) 101   Temp 99.9 °F (37.7 °C) (Oral)   Resp 20   Ht 1.778 m (5' 10\")   Wt 95.3

## 2024-12-05 NOTE — PLAN OF CARE
Problem: Chronic Conditions and Co-morbidities  Goal: Patient's chronic conditions and co-morbidity symptoms are monitored and maintained or improved  Outcome: Progressing  Flowsheets (Taken 12/5/2024 1046)  Care Plan - Patient's Chronic Conditions and Co-Morbidity Symptoms are Monitored and Maintained or Improved: Monitor and assess patient's chronic conditions and comorbid symptoms for stability, deterioration, or improvement  Note: History of cancer, recent chemo, Treating PNA.      Problem: Pain  Goal: Verbalizes/displays adequate comfort level or baseline comfort level  Outcome: Progressing  Flowsheets (Taken 12/5/2024 1046)  Verbalizes/displays adequate comfort level or baseline comfort level: Assess pain using appropriate pain scale  Note: Sore throat. Magic mouth wash given to patient .     Problem: Skin/Tissue Integrity  Goal: Absence of new skin breakdown  Description: 1.  Monitor for areas of redness and/or skin breakdown  2.  Assess vascular access sites hourly  3.  Every 4-6 hours minimum:  Change oxygen saturation probe site  4.  Every 4-6 hours:  If on nasal continuous positive airway pressure, respiratory therapy assess nares and determine need for appliance change or resting period.  Outcome: Progressing     Problem: Safety - Adult  Goal: Free from fall injury  Outcome: Progressing  Note: Bed locked & in low position, call light in reach, side-rails up x2, bed utilized, non-slip socks on when ambulating, reminded patient to use call light to call for assistance.       Problem: Respiratory - Adult  Goal: Clear lung sounds  Outcome: Progressing  Note: Wheezing this morning with assessment. Continuing to monitor      Problem: Discharge Planning  Goal: Discharge to home or other facility with appropriate resources  Outcome: Progressing  Flowsheets (Taken 12/5/2024 1046)  Discharge to home or other facility with appropriate resources: Identify barriers to discharge with patient and caregiver  Note: Pt  from home with wife.      Problem: Nutrition Deficit:  Goal: Optimize nutritional status  Outcome: Progressing     Care plan reviewed with patient.  Patient verbalizes understanding of the care plan and contributed to goal setting.

## 2024-12-05 NOTE — PROGRESS NOTES
Comprehensive Nutrition Assessment    Type and Reason for Visit:  Reassess (Moderate Malnutrition)    Nutrition Recommendations/Plan:   Continue diet per provider and encourage adequate PO intake at best efforts.   Modify ONS order: Ensure PLUS (TID) - Chocolate  Consider appetite stimulant if appetite does not improve; new chemo tx start (11/27) and would suspect PO intake to continue to be inadequate      Malnutrition Assessment:  Malnutrition Status:  Moderate malnutrition (12/03/24 1324)    Context:  Chronic Illness     Findings of the 6 clinical characteristics of malnutrition:  Energy Intake:  75% or less estimated energy requirements for 1 month or longer (~50-75% for the last 4 weeks)  Weight Loss:   (5.2% loss in 4 months; however some edema noted this admit)     Body Fat Loss:  Mild body fat loss Orbital   Muscle Mass Loss:  Mild muscle mass loss Temples (temporalis), Clavicles (pectoralis & deltoids)  Fluid Accumulation:   (moderate) Extremities   Strength:  Not Performed    Nutrition Assessment:     Pt. moderately malnourished AEB criteria as listed above.  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      Nutrition Related Findings:    Pt. Report/Treatments/Miscellaneous: Pt seen, reports sore throat and difficulties to swallow some foods. Encouraged soft foods and liquids as able. States that it has somewhat helped with throat spray but changes to another option (magic mouth wash added to MAR). Ate 2 things of raisin bran this morning - feels like some improvement in appetite. Pt states that he has been drinking the ensure shakes - prefers chocolate flavor best. Denies N/V.   GI Status: BM - 12/5  Pertinent Labs:  Na 133  Pertinent Meds: cefepime, ferrous sulfate, protonix, prednisone, rapamune, oxycodone, zofran prn, phnol 1.4% mouth spray - adding magic mouth wash    Wound Type: None       Current Nutrition Intake & Therapies:    Average Meal Intake: 26-50%, %  Average Supplements Intake: % (prefers chocolate)  ADULT DIET; Regular  ADULT ORAL NUTRITION SUPPLEMENT; Breakfast, Lunch, Dinner; Standard High Calorie/High Protein Oral Supplement    Anthropometric Measures:  Height: 177.8 cm (5' 10\")  Ideal Body Weight (IBW): 166 lbs (75 kg)    Admission Body Weight: 91.6 kg (202 lb) (11/30: stated weight)  Current Body Weight: 95.3 kg (210 lb 2 oz) (12/5: BUE +1 non-pitting, RLE trace pitting, LLE +1 pitting)  Current BMI (kg/m2): 30.1  Usual Body Weight:  (per EMR: 11/19/21: 192# 6 oz, 4/23/24: 212# 8 oz - standing scale, 9/17/24: 213# 1.6 oz (shoes off) - standing scale, 11/19/24: 209# 4.9 oz - [facility])        Weight Adjustment For: No Adjustment                 BMI Categories: Obese Class 1 (BMI 30.0-34.9)    Estimated Daily Nutrient Needs:  Energy Requirements Based On: Kcal/kg  Weight Used for Energy Requirements: Current (92 kg)  Energy (kcal/day): 5861-2723 kcals (20-25 kcals/kg)  Weight Used for Protein Requirements: Ideal (75.45 kg)  Protein (g/day):  grams (1.3-1.5 grams/kg of IBW)       Nutrition Diagnosis:   Moderate malnutrition, in context of chronic illness related to catabolic illness, decreased appetite as evidenced by criteria as identified in malnutrition assessment    Nutrition Interventions:   Food and/or Nutrient Delivery: Continue Current Diet, Modify Oral Nutrition Supplement  Nutrition Education/Counseling: Education/Counseling initiated  Coordination of Nutrition Care: Continue to monitor while inpatient       Goals:  Goals: Meet at least 75% of estimated needs, by next RD assessment  Type of Goal: Continue current goal  Previous Goal Met: Progressing toward Goal(s)    Nutrition Monitoring

## 2024-12-05 NOTE — PROGRESS NOTES
Hospitalist Progress Note    Patient:  Ousmane Lockett    Unit/Bed:3B-38/038-A  YOB: 1965  MRN: [id number]   Acct: [id number]   PCP: Daniel Barbosa MD  Date of Admission: 11/30/2024    ASSESSMENT AND PLAN  Active Problems  #Sepsis due to pneumonia:   -Met 3/4 SIRS criteria upon admission- febrile, tachycardia, acute leukocytosis. Improved.   -CXR reveals mild PNA of RML  -Started on Cefepime, Azithromycin (11/30).   -ID has been following.Last day on antibiotics on 12/06/24.  -Respiratory culture, PNA panel ordered.   -1 of 2 BC obtained upon arrival positive for coag negative staph bacteremia.  - Repeat BC x 2 ordered No growth. Continue to follow at this time, suspect contamination.     #New onset atrial fibrillation with RVR: No rate controlled- NSR. Cardiology consulted.   -ECHO  12/2 reveals EF 55-60% with mildly abnormal strain pattern. Discussed with WILEY Sanchez with Cardiology- will order CTA chest as pt is endorsing significant dyspnea with minimal exertion. Bilateral venous dopplers ordered.    -Continue with Lopressor.Eliquis for anticoagulation.    #Acute on chronic microcytic anemia: Stable.   -Anemia work up reveals iron deficiency, suspected elevated ferritin in acute phase given #1.   -No overt signs of bleeding. Monitor CBC. Start ferrous sulfate.     Chronic Conditions (reviewed and stable unless otherwise stated):   #NSCLC:   -Follows with the [facility] at [facility] for oncology.    -Diagnosed earlier this year. Patient was switched to chemotherapy instead of immunotherapy , s/p 2 doses.  First dose of chemotherapy was on Wednesday of this week, 11/27.  Current chemotherapy includes carboplatin and pemetrexed.      #Hx of renal transplant: In 2000 at [facility]. Currently on Sirolimus and prednisone 10 mg. Cr 0.7 on admission.      DVT Prophylaxis: [] Lovenox / [] Heparin / [x] SCDs / [] Already on Systemic    follow-up with PET-CT.      This document has been electronically signed by: Bjorn Hernández MD on    12/04/2024 01:35 AM      All CTs at this facility use dose modulation techniques and iterative    reconstructions, and/or weight-based dosing   when appropriate to reduce radiation to a low as reasonably achievable.      3D Post-processing was performed on this study.      Vascular duplex lower extremity venous bilateral   Final Result   No evidence of a DVT.               **This report has been created using voice recognition software. It may contain   minor errors which are inherent in voice recognition technology.**            Electronically signed by Dr Killian Taylor      XR CHEST (2 VW)   Final Result   1. Normal heart size. Right shoulder prosthesis. Mild atelectasis/pneumonia   right midlung.   2. Moderate-sized pleural effusion right side. PleurX catheter in pleural space   right side. Minimal left basilar atelectasis/pneumonia.    3. Overall appearance of chest slightly worse than prior.            **This report has been created using voice recognition software.  It may contain   minor errors which are inherent in voice recognition technology.**            Electronically signed by Dr. Renan Camejo      XR CHEST (2 VW)   Final Result   1. Infiltrate and effusion at the right lung base. Catheter at the base of the   right hemithorax.   2. Right shoulder replacement.               **This report has been created using voice recognition software. It may contain   minor errors which are inherent in voice recognition technology.**      Electronically signed by Dr. Christiano Abdullahi        XR CHEST (2 VW)    Result Date: 11/30/2024  PROCEDURE: XR CHEST (2 VW) CLINICAL INFORMATION: fever. COMPARISON: Chest x-ray dated 9/30/2024. TECHNIQUE: PA and lateral views the chest. FINDINGS: The heart size is normal.  The mediastinum is not widened.  There is increased density at the right lung base suggestive of infiltrate and

## 2024-12-05 NOTE — PROGRESS NOTES
[facility]  INPATIENT PHYSICAL THERAPY  DAILY NOTE  STRZ CCU-STEPDOWN 3B - 3B-38/038-A      Discharge Recommendations:  anticipate home w/ assist as needed    Equipment Recommendations: Yes (may need RW, will continue as assess)                 Time In: 1502  Time Out: 1525  Timed Code Treatment Minutes: 23 Minutes  Minutes: 23          Date: 2024  Patient Name: Ousmane Lockett,  Gender:  male        MRN: [id number]  : 1965  (59 y.o.)     Referring Practitioner: Terri Regalado MD  Diagnosis: Pneumonia due to infectious organism  Additional Pertinent Hx: Per H & P on 2024:59 y.o. male with a history of metastatic lung cancer, CVA, HTN, HLD who presents to the emergency department from home for evaluation of fever and malaise.  Patient diagnosed with non-small cell cancer earlier this year, currently has mets to the shoulder and questionable mets to the adrenal gland and spleen.  Patient has gone through 2 immunotherapy and the started chemotherapy just 3 days ago.  Patient had temperature 100.5 last night and 102.5 this morning.  Patient reports feeling very fatigued, with no appetite and diffuse bodyaches.  Patient denies any chest pain, abdominal pain, dyspnea, fever, rash.  Patient has a Pleurx catheter on the right, his wife drained it last night reports no change in the color, reports the color is yellow is draining easily. Per OP OT notes:  Patient underwent right reverse shoulder replacement with radical resection of proximal humerus on 24. Patient reports that they removed 4\" of his proximal humerus.Pt was found to have metastatic bone cancer in right proximal humerus     Prior Level of Function:  Lives With: Spouse  Type of Home: House  Home Layout: Two level (office is on 2nd level)  Home Access: Stairs to enter with rails  Entrance Stairs - Number of Steps: 2 LAYO with 1 handrail. full flight up to 2nd level with one sided railing. Bedroom upstairs, but  patient has been sleeping in recliner chair. Full bath both down and upstairs.  Home Equipment: None   Bathroom Shower/Tub: Walk-in shower  Bathroom Toilet: Handicap height  Bathroom Equipment: Shower chair    Prior Level of Assist for ADLs: Needs assistance (reports wife assists with donning his socks)  Prior Level of Assist for Homemaking: Needs assistance (has not been doing IADLs lately)  Ambulation Assistance: Independent  Prior Level of Assist for Transfers: Independent  Active : Yes (wife does most driving)  Additional Comments: Pt reports fully indep with ADLs & functional mobility PLOF. Wife still works but supportive and able to assist as needed. [facility] PT at the [facility] 2 times/week for right shoulder.    Restrictions/Precautions:  Restrictions/Precautions: Fall Risk  Position Activity Restriction  Other Position/Activity Restrictions: OP OT orders as of 11/25/2024:OK for AAROM for forward flexion and gentle ER, no active ROM, ok for PROM, no IR, no strengthening, ok for isometrics, ok for elbow flexion/extension; 11/1/24 orders: continue to increase activity as tolerated. ROMAT - no resisted motion until strength improves. Gradually start increasing weight bearing, 15# lifelong restriction. Elo hogan.     SUBJECTIVE: nursing ok'd therapy, pt sleeping on arrival and I had woken him up he reported that he hasn't been up yet today but was agreable to get up and eager for increased mobility- nursing wanting to drain pt after his walk and pt preferred to do this in bed so pt returned to bed     PAIN: 0/10:       Vitals: Oxygen: pt on room air he does get SOB and has difficulty saying an entire sentence w/o SOB his O2 sats were 95%     OBJECTIVE:  Bed Mobility:  Supine to Sit: Contact Guard Assistance, with increased time for completion  Sit to Supine: Contact Guard Assistance     Transfers:  Sit to Stand: Stand By Assistance  Stand to Sit:Stand By Assistance    Ambulation:  Contact Guard Assistance,

## 2024-12-06 ENCOUNTER — TELEPHONE (OUTPATIENT)
Dept: CARDIOLOGY CLINIC | Age: 59
End: 2024-12-06

## 2024-12-06 ENCOUNTER — APPOINTMENT (OUTPATIENT)
Dept: OCCUPATIONAL THERAPY | Age: 59
End: 2024-12-06
Payer: COMMERCIAL

## 2024-12-06 ENCOUNTER — APPOINTMENT (OUTPATIENT)
Age: 59
End: 2024-12-06
Payer: COMMERCIAL

## 2024-12-06 VITALS
TEMPERATURE: 98.3 F | HEIGHT: 70 IN | SYSTOLIC BLOOD PRESSURE: 140 MMHG | DIASTOLIC BLOOD PRESSURE: 67 MMHG | OXYGEN SATURATION: 97 % | BODY MASS INDEX: 28.66 KG/M2 | WEIGHT: 200.18 LBS | HEART RATE: 96 BPM | RESPIRATION RATE: 16 BRPM

## 2024-12-06 PROBLEM — I48.0 PAROXYSMAL ATRIAL FIBRILLATION (HCC): Status: ACTIVE | Noted: 2024-12-01

## 2024-12-06 PROCEDURE — 6370000000 HC RX 637 (ALT 250 FOR IP): Performed by: OPHTHALMOLOGY

## 2024-12-06 PROCEDURE — 97530 THERAPEUTIC ACTIVITIES: CPT

## 2024-12-06 PROCEDURE — 6370000000 HC RX 637 (ALT 250 FOR IP): Performed by: NURSE PRACTITIONER

## 2024-12-06 PROCEDURE — 6370000000 HC RX 637 (ALT 250 FOR IP): Performed by: INTERNAL MEDICINE

## 2024-12-06 PROCEDURE — 99239 HOSP IP/OBS DSCHRG MGMT >30: CPT | Performed by: INTERNAL MEDICINE

## 2024-12-06 PROCEDURE — 93270 REMOTE 30 DAY ECG REV/REPORT: CPT

## 2024-12-06 PROCEDURE — 6370000000 HC RX 637 (ALT 250 FOR IP)

## 2024-12-06 PROCEDURE — 2580000003 HC RX 258

## 2024-12-06 PROCEDURE — 6360000002 HC RX W HCPCS

## 2024-12-06 PROCEDURE — 99232 SBSQ HOSP IP/OBS MODERATE 35: CPT | Performed by: STUDENT IN AN ORGANIZED HEALTH CARE EDUCATION/TRAINING PROGRAM

## 2024-12-06 RX ORDER — FERROUS SULFATE 325(65) MG
325 TABLET ORAL 2 TIMES DAILY WITH MEALS
Qty: 30 TABLET | Refills: 3 | Status: SHIPPED | OUTPATIENT
Start: 2024-12-06

## 2024-12-06 RX ORDER — METOPROLOL TARTRATE 25 MG/1
25 TABLET, FILM COATED ORAL 2 TIMES DAILY
Qty: 60 TABLET | Refills: 3 | Status: SHIPPED | OUTPATIENT
Start: 2024-12-06

## 2024-12-06 RX ORDER — PANTOPRAZOLE SODIUM 40 MG/1
40 TABLET, DELAYED RELEASE ORAL
Qty: 30 TABLET | Refills: 3 | Status: SHIPPED | OUTPATIENT
Start: 2024-12-07

## 2024-12-06 RX ADMIN — METOPROLOL TARTRATE 25 MG: 25 TABLET, FILM COATED ORAL at 07:52

## 2024-12-06 RX ADMIN — PANTOPRAZOLE SODIUM 40 MG: 40 TABLET, DELAYED RELEASE ORAL at 07:51

## 2024-12-06 RX ADMIN — OXYCODONE HYDROCHLORIDE 5 MG: 5 TABLET ORAL at 03:30

## 2024-12-06 RX ADMIN — FERROUS SULFATE TAB 325 MG (65 MG ELEMENTAL FE) 325 MG: 325 (65 FE) TAB at 07:51

## 2024-12-06 RX ADMIN — PREDNISONE 10 MG: 20 TABLET ORAL at 07:51

## 2024-12-06 RX ADMIN — SIROLIMUS 1 MG: 1 TABLET ORAL at 07:52

## 2024-12-06 RX ADMIN — APIXABAN 5 MG: 5 TABLET, FILM COATED ORAL at 07:51

## 2024-12-06 RX ADMIN — Medication 5 ML: at 05:43

## 2024-12-06 RX ADMIN — CEFEPIME 2000 MG: 2 INJECTION, POWDER, FOR SOLUTION INTRAVENOUS at 05:40

## 2024-12-06 ASSESSMENT — PAIN SCALES - GENERAL
PAINLEVEL_OUTOF10: 5
PAINLEVEL_OUTOF10: 0

## 2024-12-06 ASSESSMENT — PAIN DESCRIPTION - DESCRIPTORS: DESCRIPTORS: DISCOMFORT

## 2024-12-06 ASSESSMENT — PAIN DESCRIPTION - LOCATION: LOCATION: BACK

## 2024-12-06 NOTE — CARE COORDINATION
12/6/24, 10:52 AM EST    Patient goals/plan/ treatment preferences discussed by  and .  Patient goals/plan/ treatment preferences reviewed with patient/ family.  Patient/ family verbalize understanding of discharge plan and are in agreement with goal/plan/treatment preferences.  Understanding was demonstrated using the teach back method.  AVS provided by RN at time of discharge, which includes all necessary medical information pertaining to the patients current course of illness, treatment, post-discharge goals of care, and treatment preferences.     Services At/After Discharge: Outpatient and PT       Home with wife. Has PleurX drain, wife manages. Current with OP PT at the  in [place], plan to resume.    Electronically signed by Lolita Linares RN on 12/6/2024 at 10:53 AM

## 2024-12-06 NOTE — DISCHARGE SUMMARY
Discharge Summary    Date: 12/6/2024  Patient Name: Ousmane Lockett    YOB: 1965     Age: 59 y.o.    Admit Date: 11/30/2024  Discharge Date: 12/6/2024  Discharge Condition: Good    Admission Diagnosis  History of lung cancer [Z85.118];Pneumonia of right lower lobe due to infectious organism [J18.9];Pneumonia due to infectious organism [J18.9];Sepsis without acute organ dysfunction, due to unspecified organism (HCC) [A41.9]      Discharge Diagnosis  Principal Problem:    Pneumonia due to infectious organism  Active Problems:    Sepsis without acute organ dysfunction (HCC)    New onset atrial fibrillation (HCC)    Bacteremia    Microcytic anemia    Moderate malnutrition (HCC)  Resolved Problems:    * No resolved hospital problems. *      Hospital Stay  Narrative of Hospital Course:  Ousmane Lockett is a 59 y.o. male with PMHx of NSCLC, Hx of renal transplant who presents to [facility] with fever after undergoing first dose of chemotherapy earlier this week.  Patient reports that he has been having a sore throat and had increased fatigue that began on Friday.  Patient did also have a low-grade fever on Friday at home approximately 29 °F.  Patient then had fever of 102.4 °F morning of admission.  Patient and his family called oncology at [facility] that recommended local ED presentation.  Patient denies a cough.  Patient denies any chest pains, palpitations, N/V/C/D.  Patient not requiring any supplemental oxygen and is on any supplemental oxygen at home.  Patient just overall feels fatigued and unwell.  Patient having both fevers and chills.  No increased sputum production.   #Sepsis due to pneumonia:   -Met 3/4 SIRS criteria upon admission- febrile, tachycardia, acute leukocytosis. Improved.   -CXR reveals mild PNA of RML  -Started on Cefepime, Azithromycin (11/30).   -ID has been following.Last day of antibiotics on 12/06/24.  -Respiratory culture, PNA panel ordered not done.  -1 of 2  BC obtained upon arrival positive for coag negative staph bacteremia.  - Repeat BC x 2 ordered No growth. Continue to follow at this time, suspect contamination.      #New onset atrial fibrillation with RVR: No rate controlled- NSR. Cardiology consulted.   -ECHO  12/2 reveals EF 55-60% with mildly abnormal strain pattern. Discussed with WILEY Sanchez with Cardiology- will order CTA chest as pt is endorsing significant dyspnea with minimal exertion. Bilateral venous dopplers ordered.    -Continue with Lopressor.Eliquis for anticoagulation.     #Acute on chronic microcytic anemia: Stable.   -Anemia work up reveals iron deficiency, suspected elevated ferritin in acute phase given #1.   -No overt signs of bleeding. Monitor CBC. Started on ferrous sulfate.      Chronic Conditions (reviewed and stable unless otherwise stated):   #NSCLC:   -Follows with the [facility] at [facility] for oncology.    -Diagnosed earlier this year. Patient was switched to chemotherapy instead of immunotherapy , s/p 2 doses.  First dose of chemotherapy was on Wednesday of this week, 11/27.  Current chemotherapy includes carboplatin and pemetrexed.       #Hx of renal transplant: In 2000 at [facility]. Currently on Sirolimus and prednisone 10 mg. Cr 0.7 on admission.        Consultants:  IP CONSULT TO CARDIOLOGY  IP CONSULT TO INFECTIOUS DISEASES    Surgeries/procedures Performed:      Treatments:            Discharge Plan/Disposition:  Home    Hospital/Incidental Findings Requiring Follow Up:    Patient Instructions:    Diet:    Activity:  For number of days (if applicable):      Other Instructions:    Provider Follow-Up:   No follow-ups on file.     Significant Diagnostic Studies:    Recent Labs:  Admission on 11/30/2024  No results displayed because visit has over 200 results.    ------------    Radiology last 7 days:  CTA CHEST W WO CONTRAST    Result Date: 12/4/2024  Impression: 1. Mild suboptimal study. No acute pulmonary embolus. 2. New right perihilar soft  region, not elsewhere classified; Nonallopathic lesion of lumbar region, not elsewhere classified; Nonallopathic lesion of pelvic region, not elsewhere classified; Nonallopathic lesion of upper extremities, not elsewhere classified; Nonallopathic lesion of abdomen and other sites, not elsewhere classified    Ascorbic Acid (VITAMIN C) 500 MG tablet  Take 2 tablets by mouth daily  Associated Diagnoses:Sprain of lumbosacral (joint) (ligament); Sprain of neck; Thoracic sprain and strain; Nonallopathic lesion of rib cage, not elsewhere classified; Nonallopathic lesion of lower extremities, not elsewhere classified; Nonallopathic lesion of sacral region, not elsewhere classified; Nonallopathic lesion of thoracic region, not elsewhere classified; Nonallopathic lesion of head region, not elsewhere classified; Nonallopathic lesion of cervical region, not elsewhere classified; Nonallopathic lesion of lumbar region, not elsewhere classified; Nonallopathic lesion of pelvic region, not elsewhere classified; Nonallopathic lesion of upper extremities, not elsewhere classified; Nonallopathic lesion of abdomen and other sites, not elsewhere classified    predniSONE (DELTASONE) 10 MG tablet  TAKE 1 TABLET DAILY  Qty: 90 tablet Refills: 1    acyclovir (ZOVIRAX) 200 MG capsule  Take by mouth as needed          Current Discharge Medication List        Time Spent on Discharge:  35 minutes were spent in patient examination, evaluation, counseling as well as medication reconciliation, prescriptions for required medications, discharge plan, and follow up.    Electronically signed by Elena Mesa MD on 12/6/24 at 8:42 AM EST

## 2024-12-06 NOTE — PROGRESS NOTES
[facility] Infectious Disease         Progress Note      Ousmane Lockett  MEDICAL RECORD NUMBER:  [id number]  AGE: 59 y.o.   GENDER: male  : 1965  EPISODE DATE:  2024      Assessment:     Patient is a 59-year-old male who does have a history significant for non-small cell lung cancer and prior renal transplant who I am consulted for positive blood culture with Staph epidermidis.     Patient is receiving chemotherapy and developed fever on day of admission.  Blood cultures positive 1 out of 2 with staph epi.  While on therapy with cefepime and azithromycin for potential pneumonia, he has had improvement in fever curve and leukocytosis.  This suggest that this was more than likely contamination and I would defer treatment at this time, repeat blood cultures obtained on .     Repeat blood cultures from  with no growth to date  Remains on therapy with cefepime in setting of chemotherapy and potential lower respiratory tract infection  Continue cefepime  -End of therapy    Plan to complete therapy today        Subjective:     Chief Complaint   Patient presents with    Malaise    Fever         No acute events overnight.  No complaints or concerns this a.m.      Patient Active Problem List   Diagnosis Code    Sprain of ligament of lumbosacral joint S33.9XXA    Sprain of neck S13.9XXA    Thoracic sprain and strain [id number]    CVA (cerebral vascular accident) ([facility]) I63.9    History of renal transplant Z94.0    History of non-Hodgkin lymphoma Z85.72    Hyperlipidemia E78.5    Hypertension I10    Biliary colic K80.50    Immunosuppressed status (HCC) D84.9    Metastatic carcinoma involving bone with unknown primary site (HCC) C79.51, C80.1    Primary malignant neoplasm of right lower lobe of lung (HCC) C34.31    Recurrent right pleural effusion J90    History of lung cancer Z85.118    Abnormal x-ray R93.89    Metastatic non-small cell lung cancer (HCC) C34.90    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    Pneumonia due to infectious organism J18.9    Sepsis without acute organ dysfunction (HCC) A41.9    New onset atrial fibrillation (HCC) I48.91    Bacteremia R78.81    Microcytic anemia D50.9    Moderate malnutrition (HCC) E44.0             PAST MEDICAL HISTORY        Diagnosis Date    CVA (cerebral vascular accident) (HCC)     with chemo - residual numbness in left hand    Hematoma     right leg s/p fall    Hx of blood clots     Arm    Hyperlipidemia     Hypertension     Non Hodgkin's lymphoma (HCC)         NSCLC of right lung (HCC) 2024    Renal transplant recipient     [facility] transplant follows-Demetri       PAST SURGICAL HISTORY    Past Surgical History:   Procedure Laterality Date    APPENDECTOMY          BONE MARROW TRANSPLANT      [facility] transplant once a year    CHOLECYSTECTOMY, LAPAROSCOPIC N/A 2021    CHOLECYSTECTOMY LAPAROSCOPIC performed by Abdi Carmona MD at [facility] OR    COLONOSCOPY      FACIAL SURGERY N/A 10/28/2020    WIDER EXCISION MELANOMA VERTEX OF SCALP performed by Tera Perez MD at [facility] OR    KIDNEY TRANSPLANT  2000    LOBECTOMY Right 2024    @ [facility]    OTHER SURGICAL HISTORY      CAPD catheter insertion    OTHER SURGICAL HISTORY  2000    CAPD cath removal-Dr Villeda    SHOULDER SURGERY Right 2024    Reverse shoulder repair - at [facility]       FAMILY HISTORY    Family History   Problem Relation Age of Onset    Cancer Mother 63        colon - is now      Heart Failure Father         heart attack w/stents    Diabetes Sister         5 years ago diagnosed    Diabetes Brother         5 years ago diagnosed       SOCIAL HISTORY    Social History     Tobacco Use    Smoking status: Never    Smokeless tobacco: Never   Vaping Use    Vaping status: Never Used   Substance Use Topics    Alcohol use: Not Currently     Alcohol/week: 1.0 standard drink of alcohol     Types: 1 Shots of liquor per week      1.778 m (5' 10\")   Wt 90.8 kg (200 lb 2.8 oz)   SpO2 99%   BMI 28.72 kg/m²     Wt Readings from Last 3 Encounters:   12/06/24 90.8 kg (200 lb 2.8 oz)   09/29/24 92.1 kg (203 lb)   09/17/24 98 kg (216 lb)       Immunization History   Administered Date(s) Administered    COVID-19, MODERNA Bivalent, (age 12y+), IM, 50 mcg/0.5 mL 12/20/2022    COVID-19, PFIZER, (age 12y+), IM, 30mcg/0.3mL 10/26/2024    COVID-19, US Vaccine, Vaccine Unspecified 02/10/2021, 03/09/2021, 08/18/2021, 02/17/2022       PHYSICAL EXAM    BP (!) 155/72   Pulse 94   Temp 98.8 °F (37.1 °C) (Oral)   Resp 16   Ht 1.778 m (5' 10\")   Wt 90.8 kg (200 lb 2.8 oz)   SpO2 99%   BMI 28.72 kg/m²   General:  Awake, alert, not in distress.  HEENT: pink conjunctiva, unicteric sclera, moist oral mucosa.  Chest:  bilateral air entry, Clear to auscultation,   Cardiovascular:  RRR ,S1S2, no murmur or gallop.  Abdomen:  Soft, non tender to palpation.  Extremities: no edema  Skin:  Warm and dry.  CNS: aox3         LABS       CBC:   Lab Results   Component Value Date/Time    WBC 2.3 12/05/2024 04:43 AM    HGB 7.7 12/05/2024 04:43 AM    HCT 27.0 12/05/2024 04:43 AM    MCV 74.4 12/05/2024 04:43 AM     12/05/2024 04:43 AM     BMP:   Lab Results   Component Value Date/Time     12/05/2024 04:43 AM    K 3.7 12/05/2024 04:43 AM    CL 97 12/05/2024 04:43 AM    CO2 23 12/05/2024 04:43 AM    PHOS 2.6 09/27/2023 07:59 AM    BUN 12 12/05/2024 04:43 AM    CREATININE 0.5 12/05/2024 04:43 AM     PT/INR:   Lab Results   Component Value Date/Time    INR 1.00 02/19/2016 06:54 PM     Prealbumin: No results found for: \"PREALBUMIN\"  Albumin:No results found for: \"LABALBU\"  Sed Rate:No results found for: \"SEDRATE\"  CRP: No results found for: \"CRP\"  Micro:   Lab Results   Component Value Date/Time    BC No growth 24 hours. No growth 48 hours. 12/02/2024 09:32 AM    BC No growth 24 hours. No growth 48 hours. 12/02/2024 09:32 AM      Hemoglobin A1C: No results found for:

## 2024-12-06 NOTE — PLAN OF CARE
Problem: Chronic Conditions and Co-morbidities  Goal: Patient's chronic conditions and co-morbidity symptoms are monitored and maintained or improved  12/6/2024 0856 by Mirian Bacon RN  Outcome: Progressing  Flowsheets (Taken 12/6/2024 0837)  Care Plan - Patient's Chronic Conditions and Co-Morbidity Symptoms are Monitored and Maintained or Improved: Monitor and assess patient's chronic conditions and comorbid symptoms for stability, deterioration, or improvement  12/5/2024 2339 by Ilana Ireland RN  Outcome: Progressing  Flowsheets (Taken 12/5/2024 2339)  Care Plan - Patient's Chronic Conditions and Co-Morbidity Symptoms are Monitored and Maintained or Improved: Monitor and assess patient's chronic conditions and comorbid symptoms for stability, deterioration, or improvement     Problem: Pain  Goal: Verbalizes/displays adequate comfort level or baseline comfort level  12/6/2024 0856 by Mirian Bacon RN  Outcome: Progressing  12/5/2024 2339 by Ilana Ireland RN  Outcome: Progressing  Flowsheets (Taken 12/5/2024 2339)  Verbalizes/displays adequate comfort level or baseline comfort level:   Encourage patient to monitor pain and request assistance   Assess pain using appropriate pain scale   Administer analgesics based on type and severity of pain and evaluate response   Implement non-pharmacological measures as appropriate and evaluate response     Problem: Skin/Tissue Integrity  Goal: Absence of new skin breakdown  Description: 1.  Monitor for areas of redness and/or skin breakdown  2.  Assess vascular access sites hourly  3.  Every 4-6 hours minimum:  Change oxygen saturation probe site  4.  Every 4-6 hours:  If on nasal continuous positive airway pressure, respiratory therapy assess nares and determine need for appliance change or resting period.  12/6/2024 0856 by Mirian Bacon RN  Outcome: Progressing  12/5/2024 2339 by Ilana Ireland RN  Outcome: Progressing     Problem: ABCDS Injury Assessment  Goal:

## 2024-12-06 NOTE — PROGRESS NOTES
Patient discharge instructions given, and all questions answered.Cardiac event monitor placed. Meds to beds given and family at bedside to transport in private vehicle. IV and tele removed.

## 2024-12-06 NOTE — TELEPHONE ENCOUNTER
[facility] nurse at the hospital calliing to schedule a hfu for cpt. He already was one scheduled for 01/30/2025 with [facility]; it says 2 mo f/u with 30 day event monitor. However, the nurse is telling me it needs moved up to 4-5 wks b/c of the event monitor. She then said she wasn't sure which would be better. Please advise    Patient is being discharged within the next hour and if changed prior to discharge you can call her back at #[phone] Maria E

## 2024-12-06 NOTE — PROGRESS NOTES
[facility]  [facility] CCU-STEPDOWN 3B  Occupational Therapy  Daily Note    Discharge Recommendations: 24 hour assistance or supervision and Home with Outpatient OT  Equipment Recommendations: No        Time In: 0852  Time Out: 09  Timed Code Treatment Minutes: 11 Minutes  Minutes: 11          Date: 2024  Patient Name: Ousmane Lockett,   Gender: male      Room: [facility]/038-  MRN: [id number]  : 1965  (59 y.o.)  Referring Practitioner: Terri Regalado MD  Diagnosis: Pneumonia due to infectious organism  Additional Pertinent Hx: Per H & P on 2024:59 y.o. male with a history of metastatic lung cancer, CVA, HTN, HLD who presents to the emergency department from home for evaluation of fever and malaise.  Patient diagnosed with non-small cell cancer earlier this year, currently has mets to the shoulder and questionable mets to the adrenal gland and spleen.  Patient has gone through 2 immunotherapy and the started chemotherapy just 3 days ago.  Patient had temperature 100.5 last night and 102.5 this morning.  Patient reports feeling very fatigued, with no appetite and diffuse bodyaches.  Patient denies any chest pain, abdominal pain, dyspnea, fever, rash.  Patient has a Pleurx catheter on the right, his wife drained it last night reports no change in the color, reports the color is yellow is draining easily. Per OP OT notes:  Patient underwent right reverse shoulder replacement with radical resection of proximal humerus on 24. Patient reports that they removed 4\" of his proximal humerus.Pt was found to have metastatic bone cancer in right proximal humerus    Restrictions/Precautions:  Restrictions/Precautions: Fall Risk  Position Activity Restriction  Other Position/Activity Restrictions: OP OT orders as of 2024:OK for AAROM for forward flexion and gentle ER, no active ROM, ok for PROM, no IR, no strengthening, ok for isometrics, ok for elbow flexion/extension; 24

## 2024-12-06 NOTE — PLAN OF CARE
Problem: Chronic Conditions and Co-morbidities  Goal: Patient's chronic conditions and co-morbidity symptoms are monitored and maintained or improved  12/5/2024 2339 by Ilana Ireland RN  Outcome: Progressing  Flowsheets (Taken 12/5/2024 2339)  Care Plan - Patient's Chronic Conditions and Co-Morbidity Symptoms are Monitored and Maintained or Improved: Monitor and assess patient's chronic conditions and comorbid symptoms for stability, deterioration, or improvement     Problem: Skin/Tissue Integrity  Goal: Absence of new skin breakdown  Description: 1.  Monitor for areas of redness and/or skin breakdown  2.  Assess vascular access sites hourly  3.  Every 4-6 hours minimum:  Change oxygen saturation probe site  4.  Every 4-6 hours:  If on nasal continuous positive airway pressure, respiratory therapy assess nares and determine need for appliance change or resting period.  12/5/2024 2339 by Ilana Ireland RN  Outcome: Progressing     Problem: ABCDS Injury Assessment  Goal: Absence of physical injury  Outcome: Progressing  Flowsheets (Taken 12/5/2024 2339)  Absence of Physical Injury: Implement safety measures based on patient assessment     Problem: Safety - Adult  Goal: Free from fall injury  12/5/2024 2339 by Ilana Ireland RN  Outcome: Progressing  Flowsheets (Taken 12/5/2024 2339)  Free From Fall Injury: Instruct family/caregiver on patient safety     Problem: Respiratory - Adult  Goal: Clear lung sounds  12/5/2024 2339 by Ilana Ireland RN  Outcome: Progressing     Problem: Discharge Planning  Goal: Discharge to home or other facility with appropriate resources  12/5/2024 2339 by Ilana Ireland RN  Outcome: Progressing     Problem: Nutrition Deficit:  Goal: Optimize nutritional status  12/5/2024 2339 by Ilana Ireland RN  Outcome: Progressing  Flowsheets (Taken 12/5/2024 2339)  Nutrient intake appropriate for improving, restoring, or maintaining nutritional needs: Assess nutritional status and recommend course

## 2024-12-06 NOTE — PROGRESS NOTES
Oral Anticoagulation Patient Education    Counseling provided to: Patient and spouse at bedside  Oral anticoagulant: Eliquis  Handouts provided to patient include: medication education packet    Counseling points included:  Indication for anticoagulation: Afib  Explanation of medication  Missed doses and timing of medication (contact healthcare provider if missed multiple doses)  Side effects: bruising and bleeding with emphasis on when to call provider or present to ED with head injury / trauma    CLINICAL PHARMACY: DISCHARGE MED RECONCILIATION/REVIEW    [facility] Patient?: No  Total # of Interventions Recommended: 1 -   - Updated Order #: 1     Total # Interventions Accepted: 1  Intervention Severity:   - Level 1 Intervention Present?: No   - Level 2 #: 0   - Level 3 #: 1   Time Spent (min): 45    Anila Rahman PharmD  12/6/2024 at 10:35 AM

## 2024-12-06 NOTE — TELEPHONE ENCOUNTER
Will leave appt as is due to monitor coming off around that time. Will give company a chance to process results. Call to Maria E to let her no that there will be no change in appt

## 2024-12-07 ENCOUNTER — LAB (OUTPATIENT)
Dept: LAB | Age: 59
End: 2024-12-07

## 2024-12-07 LAB
ANION GAP SERPL CALC-SCNC: 12 MEQ/L (ref 8–16)
BACTERIA BLD AEROBE CULT: NORMAL
BACTERIA BLD AEROBE CULT: NORMAL
BUN SERPL-MCNC: 12 MG/DL (ref 7–22)
CALCIUM SERPL-MCNC: 8.1 MG/DL (ref 8.5–10.5)
CHLORIDE SERPL-SCNC: 94 MEQ/L (ref 98–111)
CO2 SERPL-SCNC: 23 MEQ/L (ref 23–33)
CREAT SERPL-MCNC: 0.5 MG/DL (ref 0.4–1.2)
GFR SERPL CREATININE-BSD FRML MDRD: > 90 ML/MIN/1.73M2
GLUCOSE SERPL-MCNC: 132 MG/DL (ref 70–108)
POTASSIUM SERPL-SCNC: 4.3 MEQ/L (ref 3.5–5.2)
SODIUM SERPL-SCNC: 129 MEQ/L (ref 135–145)

## 2024-12-10 ENCOUNTER — HOSPITAL ENCOUNTER (OUTPATIENT)
Dept: OCCUPATIONAL THERAPY | Age: 59
Setting detail: THERAPIES SERIES
Discharge: HOME | End: 2024-12-10
Payer: COMMERCIAL

## 2024-12-10 PROCEDURE — 97110 THERAPEUTIC EXERCISES: CPT

## 2024-12-10 NOTE — PROGRESS NOTES
[facility]  OCCUPATIONAL THERAPY  [] EVALUATION  [x] DAILY NOTE (LAND) [] DAILY NOTE (AQUATIC ) [] PROGRESS NOTE [] DISCHARGE NOTE    [] OUTPATIENT REHABILITATION CENTER [facility]   [] [place] [facility]    [x] [facility]   [] [facility]    Date: 12/10/2024  Patient Name:  Ousmane Lockett  : 1965  MRN: [id number]  CSN: [id number]    Referring Practitioner Quinn Lovelace MD [id number]      Diagnosis Presence of right artificial shoulder joint   Treatment Diagnosis M25.511  Right Shoulder Pain  M79.601  Right Arm Pain  M25.611 Stiffness of Right Shoulder   Date of Evaluation 24   Additional Pertinent History Ousmane Lockett has a past medical history of CVA (cerebral vascular accident) (HCC), Hematoma, Hx of blood clots, Hyperlipidemia, Hypertension, Non Hodgkin's lymphoma (HCC), NSCLC of right lung (HCC), and Renal transplant recipient.  he has a past surgical history that includes Appendectomy; Kidney transplant (2000); bone marrow transplant (); Facial Surgery (N/A, 10/28/2020); Colonoscopy; other surgical history; other surgical history (2000); Cholecystectomy, laparoscopic (N/A, 2021); shoulder surgery (Right, 2024); and Lung lobectomy (Right, 2024).     Allergies No Known Allergies   Medications   Current Outpatient Medications:     pantoprazole (PROTONIX) 40 MG tablet, Take 1 tablet by mouth every morning (before breakfast), Disp: 30 tablet, Rfl: 3    metoprolol tartrate (LOPRESSOR) 25 MG tablet, Take 1 tablet by mouth 2 times daily, Disp: 60 tablet, Rfl: 3    ferrous sulfate (IRON 325) 325 (65 Fe) MG tablet, Take 1 tablet by mouth 2 times daily (with meals), Disp: 30 tablet, Rfl: 3    apixaban (ELIQUIS) 5 MG TABS tablet, Take 1 tablet by mouth 2 times daily, Disp: 60 tablet, Rfl: 1    predniSONE (DELTASONE) 10 MG tablet, TAKE 1 TABLET DAILY, Disp: 90 tablet, Rfl: 1    sirolimus (RAPAMUNE) 1 MG

## 2024-12-11 LAB — SIROLIMUS BLD-MCNC: 4.4 NG/ML

## 2024-12-13 ENCOUNTER — HOSPITAL ENCOUNTER (OUTPATIENT)
Dept: OCCUPATIONAL THERAPY | Age: 59
Setting detail: THERAPIES SERIES
Discharge: HOME | End: 2024-12-13
Payer: COMMERCIAL

## 2024-12-13 PROCEDURE — 97110 THERAPEUTIC EXERCISES: CPT

## 2024-12-13 NOTE — PROGRESS NOTES
Physician Progress Note      PATIENT:               MICHEAL AGUILAR  CSN #:                  [id number]  :                       1965  ADMIT DATE:       2024 8:23 AM  DISCH DATE:        2024 10:38 AM  RESPONDING  PROVIDER #:        Elena Mesa MD          QUERY TEXT:    Patient admitted with Sepsis/Pneumonia and has  atrial fibrillation and   started on Eliquis. If possible, please document in progress notes and   discharge summary if you are evaluating and/or treating any of the following:?  ?  The medical record reflects the following:  Risk Factors: HX CVA, HTN  Clinical Indicators: Documentation of new onset atrial fibrillation with RVR  Treatment: Eliquis  Options provided:  -- Secondary hypercoagulable state in a patient with atrial fibrillation  -- Other - I will add my own diagnosis  -- Disagree - Not applicable / Not valid  -- Disagree - Clinically unable to determine / Unknown  -- Refer to Clinical Documentation Reviewer    PROVIDER RESPONSE TEXT:    This patient has secondary hypercoagulable state in a patient with atrial   fibrillation.    Query created by: MARY VALADEZ on 2024 7:43 AM      QUERY TEXT:    Patient admitted with sepsis/pna. Pt with  poor appetite and RD consulted. If   possible, please document in progress notes and discharge summary if you are   evaluating and /or treating any of the following:    The medical record reflects the following:  Risk Factors: Cancer, poor appetite  Clinical Indicators: RD consulted. Moderate Malnutrition documented. Energy   Intake:  75% or less estimated energy requirements for 1 month or longer (  50-75% for the last 4 weeks)  Weight Loss:   (5.2% loss in 4 months; however some edema noted this admit)  Body Fat Loss:  Mild body fat loss Orbital  Muscle Mass Loss:  Mild muscle mass loss Temples (temporalis), Clavicles   (pectoralis & deltoids)  Fluid Accumulation:   (moderate) Extremities  Treatment: RD consulted. Ensure Plus  TID    ASPEN Criteria:    https://aspenjournals.onlinelibrary.chowdhury.com/doi/full/10.1177/141650419094806  5  Options provided:  -- Protein calorie malnutrition moderate  -- Other - I will add my own diagnosis  -- Disagree - Not applicable / Not valid  -- Disagree - Clinically unable to determine / Unknown  -- Refer to Clinical Documentation Reviewer    PROVIDER RESPONSE TEXT:    This patient has moderate protein calorie malnutrition.    Query created by: MARY VALADEZ on 12/5/2024 7:48 AM      Electronically signed by:  Elena Mesa MD 12/13/2024 5:19 AM

## 2024-12-13 NOTE — PROGRESS NOTES
[facility]  OCCUPATIONAL THERAPY  [] EVALUATION  [x] DAILY NOTE (LAND) [] DAILY NOTE (AQUATIC ) [] PROGRESS NOTE [] DISCHARGE NOTE    [] OUTPATIENT REHABILITATION CENTER [facility]   [] [place] [facility]    [x] [facility]   [] [facility]    Date: 2024  Patient Name:  Ousmane Lockett  : 1965  MRN: [id number]  CSN: [id number]    Referring Practitioner Quinn Lovelace MD [id number]      Diagnosis Presence of right artificial shoulder joint   Treatment Diagnosis M25.511  Right Shoulder Pain  M79.601  Right Arm Pain  M25.611 Stiffness of Right Shoulder   Date of Evaluation 24   Additional Pertinent History Ousmane Lockett has a past medical history of CVA (cerebral vascular accident) (HCC), Hematoma, Hx of blood clots, Hyperlipidemia, Hypertension, Non Hodgkin's lymphoma (HCC), NSCLC of right lung (HCC), and Renal transplant recipient.  he has a past surgical history that includes Appendectomy; Kidney transplant (2000); bone marrow transplant (); Facial Surgery (N/A, 10/28/2020); Colonoscopy; other surgical history; other surgical history (2000); Cholecystectomy, laparoscopic (N/A, 2021); shoulder surgery (Right, 2024); and Lung lobectomy (Right, 2024).     Allergies No Known Allergies   Medications   Current Outpatient Medications:     pantoprazole (PROTONIX) 40 MG tablet, Take 1 tablet by mouth every morning (before breakfast), Disp: 30 tablet, Rfl: 3    metoprolol tartrate (LOPRESSOR) 25 MG tablet, Take 1 tablet by mouth 2 times daily, Disp: 60 tablet, Rfl: 3    ferrous sulfate (IRON 325) 325 (65 Fe) MG tablet, Take 1 tablet by mouth 2 times daily (with meals), Disp: 30 tablet, Rfl: 3    apixaban (ELIQUIS) 5 MG TABS tablet, Take 1 tablet by mouth 2 times daily, Disp: 60 tablet, Rfl: 1    predniSONE (DELTASONE) 10 MG tablet, TAKE 1 TABLET DAILY, Disp: 90 tablet, Rfl: 1    sirolimus (RAPAMUNE) 1 MG

## 2024-12-14 ENCOUNTER — LAB (OUTPATIENT)
Dept: LAB | Age: 59
End: 2024-12-14

## 2024-12-14 LAB
ANION GAP SERPL CALC-SCNC: 10 MEQ/L (ref 8–16)
BUN SERPL-MCNC: 12 MG/DL (ref 7–22)
CHLORIDE SERPL-SCNC: 100 MEQ/L (ref 98–111)
CO2 SERPL-SCNC: 26 MEQ/L (ref 23–33)
CREAT SERPL-MCNC: 0.6 MG/DL (ref 0.4–1.2)
GFR SERPL CREATININE-BSD FRML MDRD: > 90 ML/MIN/1.73M2
POTASSIUM SERPL-SCNC: 3.8 MEQ/L (ref 3.5–5.2)
SODIUM SERPL-SCNC: 136 MEQ/L (ref 135–145)

## 2024-12-16 ENCOUNTER — HOSPITAL ENCOUNTER (OUTPATIENT)
Dept: OCCUPATIONAL THERAPY | Age: 59
Setting detail: THERAPIES SERIES
Discharge: HOME | End: 2024-12-16
Payer: COMMERCIAL

## 2024-12-16 PROCEDURE — 97110 THERAPEUTIC EXERCISES: CPT

## 2024-12-16 PROCEDURE — 97535 SELF CARE MNGMENT TRAINING: CPT

## 2024-12-16 PROCEDURE — 97530 THERAPEUTIC ACTIVITIES: CPT

## 2024-12-16 NOTE — THERAPY DISCHARGE
next OT appointment  12/13/24: table slides for shoulder flexion at counter top height  12/16/24: goal status; to increase functional use of right UE  []  No new Education completed  []  Reviewed Prior HEP      [x]  Patient verbalized and/or demonstrated understanding of education provided.  []  Patient unable to verbalize and/or demonstrate understanding of education provided.  Will continue education.  [x]  Barriers to learning: none    PLAN:      []  Plan of care initiated.  Plan to see patient 2 times per week for 20 weeks to address the treatment planned outlined above.  []  Continue with current plan of care  []  Modify plan of care as follows:  See patient 2 x week x 14 weeks from 11/4/24  []  Hold pending physician visit  [x]  Discharge    Time In 1615   Time Out 1655   Timed Code Minutes: 40 min   Total Treatment Time: 40 min       Dafne Mondragon, OTR/L #[id number]

## 2024-12-18 LAB — SIROLIMUS BLD-MCNC: 3.7 NG/ML

## 2024-12-21 ENCOUNTER — LAB (OUTPATIENT)
Dept: LAB | Age: 59
End: 2024-12-21

## 2024-12-21 LAB
ANION GAP SERPL CALC-SCNC: 12 MEQ/L (ref 8–16)
BUN SERPL-MCNC: 15 MG/DL (ref 7–22)
CHLORIDE SERPL-SCNC: 98 MEQ/L (ref 98–111)
CO2 SERPL-SCNC: 26 MEQ/L (ref 23–33)
CREAT SERPL-MCNC: 0.7 MG/DL (ref 0.4–1.2)
GFR SERPL CREATININE-BSD FRML MDRD: > 90 ML/MIN/1.73M2
POTASSIUM SERPL-SCNC: 4 MEQ/L (ref 3.5–5.2)
SODIUM SERPL-SCNC: 136 MEQ/L (ref 135–145)

## 2024-12-25 LAB — SIROLIMUS BLD-MCNC: 3.3 NG/ML

## 2024-12-26 ENCOUNTER — CLINICAL DOCUMENTATION (OUTPATIENT)
Dept: CASE MANAGEMENT | Age: 59
End: 2024-12-26

## 2024-12-26 ENCOUNTER — HOSPITAL ENCOUNTER (OUTPATIENT)
Dept: INFUSION THERAPY | Age: 59
Discharge: HOME | End: 2024-12-26
Payer: COMMERCIAL

## 2024-12-26 ENCOUNTER — OFFICE VISIT (OUTPATIENT)
Dept: ONCOLOGY | Age: 59
End: 2024-12-26
Payer: COMMERCIAL

## 2024-12-26 ENCOUNTER — APPOINTMENT (OUTPATIENT)
Dept: OCCUPATIONAL THERAPY | Age: 59
End: 2024-12-26
Payer: COMMERCIAL

## 2024-12-26 VITALS
OXYGEN SATURATION: 98 % | DIASTOLIC BLOOD PRESSURE: 62 MMHG | HEART RATE: 88 BPM | RESPIRATION RATE: 16 BRPM | SYSTOLIC BLOOD PRESSURE: 124 MMHG | TEMPERATURE: 98.1 F

## 2024-12-26 VITALS
OXYGEN SATURATION: 98 % | HEART RATE: 88 BPM | WEIGHT: 186 LBS | DIASTOLIC BLOOD PRESSURE: 62 MMHG | BODY MASS INDEX: 26.63 KG/M2 | HEIGHT: 70 IN | SYSTOLIC BLOOD PRESSURE: 124 MMHG | RESPIRATION RATE: 16 BRPM | TEMPERATURE: 98.1 F

## 2024-12-26 DIAGNOSIS — Z85.820 HISTORY OF MELANOMA: ICD-10-CM

## 2024-12-26 DIAGNOSIS — C34.90 METASTATIC NON-SMALL CELL LUNG CANCER (HCC): Primary | ICD-10-CM

## 2024-12-26 DIAGNOSIS — Z94.0 HISTORY OF RENAL TRANSPLANT: ICD-10-CM

## 2024-12-26 DIAGNOSIS — Z94.81 HISTORY OF BONE MARROW TRANSPLANT (HCC): ICD-10-CM

## 2024-12-26 DIAGNOSIS — Z85.72 HISTORY OF NON-HODGKIN LYMPHOMA: ICD-10-CM

## 2024-12-26 PROCEDURE — 3078F DIAST BP <80 MM HG: CPT | Performed by: INTERNAL MEDICINE

## 2024-12-26 PROCEDURE — 99211 OFF/OP EST MAY X REQ PHY/QHP: CPT

## 2024-12-26 PROCEDURE — 99205 OFFICE O/P NEW HI 60 MIN: CPT | Performed by: INTERNAL MEDICINE

## 2024-12-26 PROCEDURE — 3074F SYST BP LT 130 MM HG: CPT | Performed by: INTERNAL MEDICINE

## 2024-12-26 NOTE — PROGRESS NOTES
Name: Ousmane Lockett  : 1965  MRN: [id number]    Oncology Navigation- Initial Note:    Intake-  Contact Type: Medical Oncology  On Behalf of EIMLEE Portillo Lung Trev    Diagnosis: Thoracic- malignant  Metastatic- current with tx at [facility], considering transition of tx to be completed locally, in collaboration with [facility].    Home Disposition: Lives with other who is able to assist    Independent Self Care:  yes    Durable Medical Equipment:  no    Patient needs and barriers to care: Coordination of Care, Distance for Care, and Symptom Management     Referral Source: Outpatient    Receptive to Advanced Care Planning/ Palliative Care:  deferred today    Interventions-   Oncologist Plan of Care:    Pt met with Dr. Awada in consultation today at pt request, as pt is considering tx locally.  -Pt due for tx on 25-->ONC encouraged pt to cont with tx at [facility] until he decides how he wishes to proceed.  Pt receiving tx every 3 weeks at [facility].  -REPEAT PET ASAP- message to Leny for assistance.  -Plan IR BX to most amendable pendng PET-->Dr. Awada will place order s/p PET completion.  ONC has concern there may be an additional malignancy.   -Obtain CT completed at [facility] on - call to [facility] [phone], spoke with Rene.    -Return to ofce after PET & IR BX.    -Pt will decide if he prefers to receive tx locally; ONC informed pt [facility] can work collaboratively together to provide his overall care & management.      Plan of Care Reviewed:  yes    Referrals: Supportive Therapies +    Navigation:  Welcome Packet reviewed including contact information    Education:  yes     Continuum of Care: Diagnosis/Active Treatment    Notes: Navigator is available to assist & support as needed.    Electronically signed by Sarah Beth Wesley RN on 2024 at 3:14 PM

## 2024-12-26 NOTE — PATIENT INSTRUCTIONS
-Ordered PET/CT scan. Please arrange for it ASAP.  -Plan to obtain tissue biopsy based on PET/CT scan results.  -Return to clinic after PET/CT is completed to discuss results and recommendations.

## 2024-12-28 ENCOUNTER — LAB (OUTPATIENT)
Dept: LAB | Age: 59
End: 2024-12-28

## 2024-12-28 LAB
ANION GAP SERPL CALC-SCNC: 12 MEQ/L (ref 8–16)
BUN SERPL-MCNC: 12 MG/DL (ref 7–22)
CHLORIDE SERPL-SCNC: 101 MEQ/L (ref 98–111)
CO2 SERPL-SCNC: 25 MEQ/L (ref 23–33)
CREAT SERPL-MCNC: 0.7 MG/DL (ref 0.4–1.2)
GFR SERPL CREATININE-BSD FRML MDRD: > 90 ML/MIN/1.73M2
POTASSIUM SERPL-SCNC: 4.1 MEQ/L (ref 3.5–5.2)
SODIUM SERPL-SCNC: 138 MEQ/L (ref 135–145)

## 2025-01-01 LAB — SIROLIMUS BLD-MCNC: 2.5 NG/ML

## 2025-01-03 ENCOUNTER — HOSPITAL ENCOUNTER (OUTPATIENT)
Dept: PET IMAGING | Age: 60
Discharge: HOME | End: 2025-01-03
Attending: INTERNAL MEDICINE
Payer: COMMERCIAL

## 2025-01-03 ENCOUNTER — SOCIAL WORK (OUTPATIENT)
Dept: INFUSION THERAPY | Age: 60
End: 2025-01-03

## 2025-01-03 DIAGNOSIS — C34.90 METASTATIC NON-SMALL CELL LUNG CANCER (HCC): ICD-10-CM

## 2025-01-03 PROCEDURE — A9609 HC RX DIAGNOSTIC RADIOPHARMACEUTICAL: HCPCS | Performed by: INTERNAL MEDICINE

## 2025-01-03 PROCEDURE — 3430000000 HC RX DIAGNOSTIC RADIOPHARMACEUTICAL: Performed by: INTERNAL MEDICINE

## 2025-01-03 PROCEDURE — 78815 PET IMAGE W/CT SKULL-THIGH: CPT

## 2025-01-03 RX ORDER — FLUDEOXYGLUCOSE F 18 200 MCI/ML
10.4 INJECTION, SOLUTION INTRAVENOUS
Status: COMPLETED | OUTPATIENT
Start: 2025-01-03 | End: 2025-01-03

## 2025-01-03 RX ADMIN — FLUDEOXYGLUCOSE F 18 10.4 MILLICURIE: 200 INJECTION, SOLUTION INTRAVENOUS at 10:14

## 2025-01-03 NOTE — PROGRESS NOTES
assistance change.    Recommendation: Follow-up will be initiated based on need.  provided my contact information and will remain available for support.        JACK Neville, ALDO, JING  Oncology Social Worker      Electronically signed by JACK Neville LSW, JING on 1/3/2025 at 1:58 PM

## 2025-01-06 ENCOUNTER — TELEPHONE (OUTPATIENT)
Dept: CARDIOLOGY CLINIC | Age: 60
End: 2025-01-06

## 2025-01-06 ENCOUNTER — CASE MANAGEMENT (OUTPATIENT)
Dept: CASE MANAGEMENT | Age: 60
End: 2025-01-06

## 2025-01-06 DIAGNOSIS — C34.90 METASTATIC NON-SMALL CELL LUNG CANCER (HCC): Primary | ICD-10-CM

## 2025-01-06 NOTE — TELEPHONE ENCOUNTER
Okay. I have not seen in a while and looks like many events since last seen and admissions  Will address on follow up

## 2025-01-07 NOTE — PROGRESS NOTES
Name: Ousmane Lockett  : 1965  MRN: [id number]    Oncology Navigation      Contact Type:  Telephone    Notes: ifeanyi called IR-stef Ruff for liver biopsy. Appt 2025 @0930.  Ifeanyi called pt and informed of biopsy time & instructions  -arrive @0930 2nd floor outpt nursing  -NPO 4 hours prior  -no blood thinner/ASA 5 days prior  -hold Eliquis  3 days prior  -need   Voiced understanding to appt and instructions.    Electronically signed by Veronica Portillo RN on 2025 at 11:17 AM

## 2025-01-07 NOTE — PROGRESS NOTES
Name: Ousmane Lockett  : 1965  MRN: [id number]    Oncology Navigation     Contact Type:  Telephone    Notes: ifeanyi received call from pt regarding his apt @[facility] tomorrow for tx- wants to resume tx locally-confirming needs to have [facility] dc current plan so we could order.  Ifeanyi spoke Dr. Awada. Ifeanyi called pt- informed to not dc any plans with [facility]. Recent PET  scan revealed liver involvement - MD ordered for IR biopsy.  Pending results may determine tx plan. Ifeanyi to call back once biopsy scheduled. Pt to keep appt with Dr. Awada 2025.       Electronically signed by Veronica Portillo RN on 2025 at 11:06 AM

## 2025-01-08 ENCOUNTER — SCHEDULED TELEPHONE ENCOUNTER (OUTPATIENT)
Dept: ONCOLOGY | Age: 60
End: 2025-01-08

## 2025-01-08 DIAGNOSIS — C34.90 METASTATIC NON-SMALL CELL LUNG CANCER (HCC): Primary | ICD-10-CM

## 2025-01-08 NOTE — PATIENT INSTRUCTIONS
-Patient to proceed with IR guided liver biopsy on 1/16/2025.  -Please arrange for in person clinic visit on 1/23/2025.

## 2025-01-08 NOTE — PROGRESS NOTES
patient voiced to me that his main reason for self-referral was to further investigate these findings which are concerning him.  -I informed that I would start first by obtaining a PET/CT scan in order to further define his disease followed by IR guided biopsy of the most accessible and hypermetabolic new metastatic lesion that progressed while on immunotherapy.  -In the interim, the patient will continue his treatments at [facility].  He confirmed to me that his next visit is scheduled on 1/7/25.  -I strongly recommended that he continues to follow-up at [facility] thoracic clinic given the need for expertise recommendations especially given his complex medical history (bone marrow transplant, renal transplantation, melanoma, and others).  -Of note, the other reason for self-referral is his concerns that his insurance would not be covering future treatments at [facility]. This has not been confirmed yet.  -The plan is to have the patient return to clinic after PET/CT scan to discuss results and further recommendations.    Interim results  -PET/CT scan (1/3/24) showed small mildly FDG avid right lung nodule highly suspicious for malignancy. Multiple FDG avid hypoattenuating lesions throughout the liver, likely metastatic disease. Diffuse FDG avid osseous metastatic disease.    1/8/25: Educated the patient on PET/CT scan results.  -Requested IR guided biopsy of metastatic liver lesion in order to to confirm metastatic disease versus different primary malignancy versus benign process.  This has been scheduled for 1/16/25.  -Plan to have the patient return to clinic on 1/23/25 to discuss biopsy results and recommendations.  -In the interim, the patient will continue treatments at [facility].  Next treatment is scheduled for 1/28/25.    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  -Status post radiation to right humeral head lesion (3000 cGy) between 4/22/24 - 5/3/24.

## 2025-01-10 ENCOUNTER — APPOINTMENT (OUTPATIENT)
Dept: CT IMAGING | Age: 60
End: 2025-01-10
Payer: COMMERCIAL

## 2025-01-10 ENCOUNTER — APPOINTMENT (OUTPATIENT)
Dept: GENERAL RADIOLOGY | Age: 60
End: 2025-01-10
Payer: COMMERCIAL

## 2025-01-10 ENCOUNTER — HOSPITAL ENCOUNTER (OUTPATIENT)
Age: 60
Setting detail: OBSERVATION
Discharge: HOME | End: 2025-01-11
Attending: EMERGENCY MEDICINE | Admitting: STUDENT IN AN ORGANIZED HEALTH CARE EDUCATION/TRAINING PROGRAM
Payer: COMMERCIAL

## 2025-01-10 DIAGNOSIS — R00.2 PALPITATIONS: Primary | ICD-10-CM

## 2025-01-10 DIAGNOSIS — R91.8 PULMONARY NODULES: ICD-10-CM

## 2025-01-10 DIAGNOSIS — R07.9 CHEST PAIN, UNSPECIFIED TYPE: ICD-10-CM

## 2025-01-10 PROBLEM — I48.91 A-FIB (HCC): Status: ACTIVE | Noted: 2025-01-10

## 2025-01-10 LAB
ALBUMIN SERPL BCG-MCNC: 3.1 G/DL (ref 3.5–5.1)
ALP SERPL-CCNC: 159 U/L (ref 38–126)
ALT SERPL W/O P-5'-P-CCNC: 35 U/L (ref 11–66)
ANION GAP SERPL CALC-SCNC: 11 MEQ/L (ref 8–16)
ANISOCYTOSIS BLD QL SMEAR: PRESENT
AST SERPL-CCNC: 21 U/L (ref 5–40)
BASOPHILS ABSOLUTE: 0 THOU/MM3 (ref 0–0.1)
BASOPHILS NFR BLD AUTO: 0 %
BILIRUB SERPL-MCNC: 0.3 MG/DL (ref 0.3–1.2)
BILIRUB UR QL STRIP.AUTO: NEGATIVE
BUN SERPL-MCNC: 19 MG/DL (ref 7–22)
CALCIUM SERPL-MCNC: 8.6 MG/DL (ref 8.5–10.5)
CHARACTER UR: CLEAR
CHLORIDE SERPL-SCNC: 100 MEQ/L (ref 98–111)
CO2 SERPL-SCNC: 24 MEQ/L (ref 23–33)
COLOR, UA: YELLOW
CREAT SERPL-MCNC: 0.7 MG/DL (ref 0.4–1.2)
D DIMER PPP IA.FEU-MCNC: 1155 NG/ML FEU (ref 0–500)
DEPRECATED RDW RBC AUTO: 66.8 FL (ref 35–45)
EKG ATRIAL RATE: 89 BPM
EKG P AXIS: 35 DEGREES
EKG P-R INTERVAL: 128 MS
EKG Q-T INTERVAL: 368 MS
EKG QRS DURATION: 90 MS
EKG QTC CALCULATION (BAZETT): 447 MS
EKG R AXIS: -35 DEGREES
EKG T AXIS: 39 DEGREES
EKG VENTRICULAR RATE: 89 BPM
EOSINOPHIL NFR BLD AUTO: 0.1 %
EOSINOPHILS ABSOLUTE: 0 THOU/MM3 (ref 0–0.4)
ERYTHROCYTE [DISTWIDTH] IN BLOOD BY AUTOMATED COUNT: 23.5 % (ref 11.5–14.5)
FLUAV RNA RESP QL NAA+PROBE: NOT DETECTED
FLUBV RNA RESP QL NAA+PROBE: NOT DETECTED
GFR SERPL CREATININE-BSD FRML MDRD: > 90 ML/MIN/1.73M2
GLUCOSE SERPL-MCNC: 125 MG/DL (ref 70–108)
GLUCOSE UR QL STRIP.AUTO: NEGATIVE MG/DL
HCT VFR BLD AUTO: 29.8 % (ref 42–52)
HGB BLD-MCNC: 8.2 GM/DL (ref 14–18)
HGB UR QL STRIP.AUTO: NEGATIVE
HYPOCHROMIA BLD QL SMEAR: PRESENT
IMM GRANULOCYTES # BLD AUTO: 0.06 THOU/MM3 (ref 0–0.07)
IMM GRANULOCYTES NFR BLD AUTO: 0.7 %
KETONES UR QL STRIP.AUTO: NEGATIVE
LYMPHOCYTES ABSOLUTE: 1.6 THOU/MM3 (ref 1–4.8)
LYMPHOCYTES NFR BLD AUTO: 19.8 %
MCH RBC QN AUTO: 22.1 PG (ref 26–33)
MCHC RBC AUTO-ENTMCNC: 27.5 GM/DL (ref 32.2–35.5)
MCV RBC AUTO: 80.3 FL (ref 80–94)
MONOCYTES ABSOLUTE: 0.2 THOU/MM3 (ref 0.4–1.3)
MONOCYTES NFR BLD AUTO: 2 %
NEUTROPHILS ABSOLUTE: 6.3 THOU/MM3 (ref 1.8–7.7)
NEUTROPHILS NFR BLD AUTO: 77.4 %
NITRITE UR QL STRIP: NEGATIVE
NRBC BLD AUTO-RTO: 0 /100 WBC
NT-PROBNP SERPL IA-MCNC: 900.9 PG/ML (ref 0–124)
OSMOLALITY SERPL CALC.SUM OF ELEC: 273.8 MOSMOL/KG (ref 275–300)
PH UR STRIP.AUTO: 6.5 [PH] (ref 5–9)
PLATELET # BLD AUTO: 393 THOU/MM3 (ref 130–400)
PLATELET BLD QL SMEAR: ADEQUATE
PMV BLD AUTO: 9.3 FL (ref 9.4–12.4)
POIKILOCYTES: ABNORMAL
POTASSIUM SERPL-SCNC: 4.5 MEQ/L (ref 3.5–5.2)
PROT SERPL-MCNC: 5.4 G/DL (ref 6.1–8)
PROT UR STRIP.AUTO-MCNC: NEGATIVE MG/DL
RBC # BLD AUTO: 3.71 MILL/MM3 (ref 4.7–6.1)
SARS-COV-2 RNA RESP QL NAA+PROBE: NOT DETECTED
SCAN OF BLOOD SMEAR: NORMAL
SCHISTOCYTES BLD QL SMEAR: ABNORMAL
SODIUM SERPL-SCNC: 135 MEQ/L (ref 135–145)
SP GR UR REFRACT.AUTO: 1.02 (ref 1–1.03)
TROPONIN, HIGH SENSITIVITY: 34 NG/L (ref 0–12)
TROPONIN, HIGH SENSITIVITY: 35 NG/L (ref 0–12)
TSH SERPL DL<=0.005 MIU/L-ACNC: 1.51 UIU/ML (ref 0.4–4.2)
UROBILINOGEN, URINE: 0.2 EU/DL (ref 0–1)
WBC # BLD AUTO: 8.2 THOU/MM3 (ref 4.8–10.8)
WBC #/AREA URNS HPF: NEGATIVE /[HPF]

## 2025-01-10 PROCEDURE — 80195 ASSAY OF SIROLIMUS: CPT

## 2025-01-10 PROCEDURE — G0378 HOSPITAL OBSERVATION PER HR: HCPCS

## 2025-01-10 PROCEDURE — 6370000000 HC RX 637 (ALT 250 FOR IP)

## 2025-01-10 PROCEDURE — 99285 EMERGENCY DEPT VISIT HI MDM: CPT

## 2025-01-10 PROCEDURE — 93005 ELECTROCARDIOGRAM TRACING: CPT | Performed by: EMERGENCY MEDICINE

## 2025-01-10 PROCEDURE — 71275 CT ANGIOGRAPHY CHEST: CPT

## 2025-01-10 PROCEDURE — 87636 SARSCOV2 & INF A&B AMP PRB: CPT

## 2025-01-10 PROCEDURE — 85379 FIBRIN DEGRADATION QUANT: CPT

## 2025-01-10 PROCEDURE — 36415 COLL VENOUS BLD VENIPUNCTURE: CPT

## 2025-01-10 PROCEDURE — 84443 ASSAY THYROID STIM HORMONE: CPT

## 2025-01-10 PROCEDURE — 82728 ASSAY OF FERRITIN: CPT

## 2025-01-10 PROCEDURE — 83540 ASSAY OF IRON: CPT

## 2025-01-10 PROCEDURE — 83880 ASSAY OF NATRIURETIC PEPTIDE: CPT

## 2025-01-10 PROCEDURE — 84484 ASSAY OF TROPONIN QUANT: CPT

## 2025-01-10 PROCEDURE — 85025 COMPLETE CBC W/AUTO DIFF WBC: CPT

## 2025-01-10 PROCEDURE — 71045 X-RAY EXAM CHEST 1 VIEW: CPT

## 2025-01-10 PROCEDURE — 80053 COMPREHEN METABOLIC PANEL: CPT

## 2025-01-10 PROCEDURE — 93010 ELECTROCARDIOGRAM REPORT: CPT | Performed by: INTERNAL MEDICINE

## 2025-01-10 PROCEDURE — 83550 IRON BINDING TEST: CPT

## 2025-01-10 PROCEDURE — 81003 URINALYSIS AUTO W/O SCOPE: CPT

## 2025-01-10 PROCEDURE — 87040 BLOOD CULTURE FOR BACTERIA: CPT

## 2025-01-10 PROCEDURE — 6360000004 HC RX CONTRAST MEDICATION: Performed by: EMERGENCY MEDICINE

## 2025-01-10 RX ORDER — ACETAMINOPHEN 650 MG/1
650 SUPPOSITORY RECTAL EVERY 6 HOURS PRN
Status: DISCONTINUED | OUTPATIENT
Start: 2025-01-10 | End: 2025-01-11 | Stop reason: HOSPADM

## 2025-01-10 RX ORDER — ACETAMINOPHEN 325 MG/1
650 TABLET ORAL EVERY 6 HOURS PRN
Status: DISCONTINUED | OUTPATIENT
Start: 2025-01-10 | End: 2025-01-11 | Stop reason: HOSPADM

## 2025-01-10 RX ORDER — METOPROLOL TARTRATE 25 MG/1
25 TABLET, FILM COATED ORAL 2 TIMES DAILY
Status: DISCONTINUED | OUTPATIENT
Start: 2025-01-10 | End: 2025-01-11 | Stop reason: HOSPADM

## 2025-01-10 RX ORDER — SODIUM CHLORIDE 9 MG/ML
INJECTION, SOLUTION INTRAVENOUS PRN
Status: DISCONTINUED | OUTPATIENT
Start: 2025-01-10 | End: 2025-01-11 | Stop reason: HOSPADM

## 2025-01-10 RX ORDER — PANTOPRAZOLE SODIUM 40 MG/1
40 TABLET, DELAYED RELEASE ORAL
Status: DISCONTINUED | OUTPATIENT
Start: 2025-01-11 | End: 2025-01-11 | Stop reason: HOSPADM

## 2025-01-10 RX ORDER — ONDANSETRON 2 MG/ML
4 INJECTION INTRAMUSCULAR; INTRAVENOUS EVERY 6 HOURS PRN
Status: DISCONTINUED | OUTPATIENT
Start: 2025-01-10 | End: 2025-01-11 | Stop reason: HOSPADM

## 2025-01-10 RX ORDER — MAGNESIUM SULFATE IN WATER 40 MG/ML
2000 INJECTION, SOLUTION INTRAVENOUS PRN
Status: DISCONTINUED | OUTPATIENT
Start: 2025-01-10 | End: 2025-01-11 | Stop reason: HOSPADM

## 2025-01-10 RX ORDER — PREDNISONE 10 MG/1
10 TABLET ORAL DAILY
Status: DISCONTINUED | OUTPATIENT
Start: 2025-01-11 | End: 2025-01-11 | Stop reason: HOSPADM

## 2025-01-10 RX ORDER — FUROSEMIDE 20 MG/1
20 TABLET ORAL DAILY
Status: DISCONTINUED | OUTPATIENT
Start: 2025-01-11 | End: 2025-01-11 | Stop reason: HOSPADM

## 2025-01-10 RX ORDER — ONDANSETRON 4 MG/1
4 TABLET, ORALLY DISINTEGRATING ORAL EVERY 8 HOURS PRN
Status: DISCONTINUED | OUTPATIENT
Start: 2025-01-10 | End: 2025-01-11 | Stop reason: HOSPADM

## 2025-01-10 RX ORDER — SIROLIMUS 1 MG/1
1 TABLET, FILM COATED ORAL DAILY
Status: DISCONTINUED | OUTPATIENT
Start: 2025-01-11 | End: 2025-01-11 | Stop reason: HOSPADM

## 2025-01-10 RX ORDER — POTASSIUM CHLORIDE 1500 MG/1
40 TABLET, EXTENDED RELEASE ORAL PRN
Status: DISCONTINUED | OUTPATIENT
Start: 2025-01-10 | End: 2025-01-11 | Stop reason: HOSPADM

## 2025-01-10 RX ORDER — OXYCODONE HYDROCHLORIDE 5 MG/1
5 TABLET ORAL ONCE
Status: COMPLETED | OUTPATIENT
Start: 2025-01-10 | End: 2025-01-10

## 2025-01-10 RX ORDER — POLYETHYLENE GLYCOL 3350 17 G/17G
17 POWDER, FOR SOLUTION ORAL DAILY PRN
Status: DISCONTINUED | OUTPATIENT
Start: 2025-01-10 | End: 2025-01-11 | Stop reason: HOSPADM

## 2025-01-10 RX ORDER — POTASSIUM CHLORIDE 7.45 MG/ML
10 INJECTION INTRAVENOUS PRN
Status: DISCONTINUED | OUTPATIENT
Start: 2025-01-10 | End: 2025-01-11 | Stop reason: HOSPADM

## 2025-01-10 RX ORDER — SODIUM CHLORIDE 0.9 % (FLUSH) 0.9 %
5-40 SYRINGE (ML) INJECTION EVERY 12 HOURS SCHEDULED
Status: DISCONTINUED | OUTPATIENT
Start: 2025-01-10 | End: 2025-01-11 | Stop reason: HOSPADM

## 2025-01-10 RX ORDER — OXYCODONE HYDROCHLORIDE 5 MG/1
5 TABLET ORAL EVERY 6 HOURS PRN
Status: DISCONTINUED | OUTPATIENT
Start: 2025-01-10 | End: 2025-01-11

## 2025-01-10 RX ORDER — SODIUM CHLORIDE 0.9 % (FLUSH) 0.9 %
5-40 SYRINGE (ML) INJECTION PRN
Status: DISCONTINUED | OUTPATIENT
Start: 2025-01-10 | End: 2025-01-11 | Stop reason: HOSPADM

## 2025-01-10 RX ORDER — IOPAMIDOL 755 MG/ML
80 INJECTION, SOLUTION INTRAVASCULAR
Status: COMPLETED | OUTPATIENT
Start: 2025-01-10 | End: 2025-01-10

## 2025-01-10 RX ORDER — FERROUS SULFATE 325(65) MG
325 TABLET ORAL 2 TIMES DAILY WITH MEALS
Status: DISCONTINUED | OUTPATIENT
Start: 2025-01-11 | End: 2025-01-11 | Stop reason: HOSPADM

## 2025-01-10 RX ADMIN — IOPAMIDOL 80 ML: 755 INJECTION, SOLUTION INTRAVENOUS at 20:17

## 2025-01-10 RX ADMIN — OXYCODONE HYDROCHLORIDE 5 MG: 5 TABLET ORAL at 19:55

## 2025-01-10 RX ADMIN — OXYCODONE HYDROCHLORIDE 5 MG: 5 TABLET ORAL at 22:38

## 2025-01-10 ASSESSMENT — PAIN SCALES - GENERAL
PAINLEVEL_OUTOF10: 5
PAINLEVEL_OUTOF10: 0
PAINLEVEL_OUTOF10: 2
PAINLEVEL_OUTOF10: 4
PAINLEVEL_OUTOF10: 8

## 2025-01-10 ASSESSMENT — PAIN DESCRIPTION - DESCRIPTORS: DESCRIPTORS: ACHING

## 2025-01-10 ASSESSMENT — PAIN DESCRIPTION - LOCATION
LOCATION: SHOULDER
LOCATION: CHEST;SHOULDER

## 2025-01-10 ASSESSMENT — HEART SCORE: ECG: NORMAL

## 2025-01-10 ASSESSMENT — PAIN DESCRIPTION - ORIENTATION
ORIENTATION: RIGHT
ORIENTATION: RIGHT

## 2025-01-10 ASSESSMENT — PAIN DESCRIPTION - ONSET: ONSET: ON-GOING

## 2025-01-10 ASSESSMENT — PAIN DESCRIPTION - PAIN TYPE: TYPE: CHRONIC PAIN

## 2025-01-10 ASSESSMENT — LIFESTYLE VARIABLES: HOW OFTEN DO YOU HAVE A DRINK CONTAINING ALCOHOL: NEVER

## 2025-01-10 ASSESSMENT — PAIN DESCRIPTION - FREQUENCY: FREQUENCY: CONTINUOUS

## 2025-01-10 NOTE — ED TRIAGE NOTES
Patient presents to the Emergency Department via self with wife. Patient presents with a complaint of palpitations starting around 1500. Patient states that he is currently with going chemo at the [facility] for NSCLC. Pt also states pain at pleurex drain site due to lobectomy of half the right lobe. EKG obtained and IV inserted. Patient is alert and oriented x4, patient does not appear in acute distress upon triage. Patient respirations are regular and unlabored with even rise and fall of chest. Patient family at the bedside. Call light within reach. Lethargy noted. Temp of 100 noted this morning per patients wife.

## 2025-01-10 NOTE — ED PROVIDER NOTES
[facility] EMERGENCY DEPARTMENT  EMERGENCY DEPARTMENT ENCOUNTER          Pt Name: Ousmane Lockett  MRN: [id number]  Birthdate 1965  Date of evaluation: 1/10/2025  Resident Physician: Neal Welch MD EM Resident PGY-3  Attending Physician: Maninder Malcolm DO      CHIEF COMPLAINT       Chief Complaint   Patient presents with    Palpitations         HISTORY OF PRESENT ILLNESS    HPI  Ousmane Lockett is a 59 y.o. male who presents to the emergency department from home, by private vehicle for evaluation of palpitations.  Patient states has been having intermittent palpitations since 1500 today.  Patient had recently finished a 30-day Holter monitor for Dr. Newby's cardiologist and has not heard back from the results and this ended this past Sunday.  Patient states that he had several minutes of feeling palpitations with associated chest pressure and shortness of breath.  Patient has history of metastatic cancer, kidney transplant undergoing chemotherapy at the [facility] for non-small cell lung cancer.  Patient has a Pleurx drain due to lobectomy of right lobe.  Wife at bedside states that she drains it Monday Wednesday Friday last done on Wednesday.  States that his fluid was dark melvin approximately 200 cc.  Patient states he was feeling unwell this morning felt fever and chills was underneath an electric blanket and had a slightly elevated temperature.      The patient has no other acute complaints at this time.    ROS negative except as stated above.    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   Right-sided spiculated nodule has increased in size and is favored to be   metastatic disease, currently measuring up to 1.6 cm.  Numerous liver lesions, metastatic disease.  Trace ascites in the abdomen, new.   [WF]      ED Course User Index  [WF] Neal Welch MD       (A negative COVID-19 test should be interpreted as COVID no longer suspected unless otherwise noted in this encounter documentation note)    PROCEDURES: (None if blank)  Procedures:       MEDICATION CHANGES     New Prescriptions    No medications on file         FINAL DISPOSITION   MDM  Patient 59-year-old male presented to ED today for concern of palpitations with associated chest pain and shortness of breath.  Patient found to have elevated troponin 34 elevated .  Chest x-ray showing a Pleurx catheter in place to the right side with moderate effusion.  Patient found to be in normal sinus rhythm on initial EKG.  Decision to admit patient for further evaluation management of his palpitations and shortness of breath.  D-dimer was added on to evaluate for potential of PE is found to be elevated at 1155, ordered CTA chest,  no sign of PE but worsening of nodules highly suspicious for malignancy.  Decision to admit patient for further evaluation of his chest pain and shortness of breath with likely metastatic cancer.    Shared Decision-Making was performed, disposition discussed with the patient/family and questions answered.    Outpatient follow up (If applicable):  No follow-up provider specified.      FINAL DIAGNOSES:  Final diagnoses:   Palpitations   Chest pain, unspecified type   Pulmonary nodules       Condition: condition: stable  Dispo: Admit to hospitalist.  DISPOSITION Decision To Admit 01/10/2025 09:23:37 PM   DISPOSITION CONDITION Stable           This transcription was electronically signed. It was

## 2025-01-11 VITALS
BODY MASS INDEX: 27.14 KG/M2 | RESPIRATION RATE: 17 BRPM | DIASTOLIC BLOOD PRESSURE: 61 MMHG | HEIGHT: 70 IN | TEMPERATURE: 98.7 F | WEIGHT: 189.6 LBS | OXYGEN SATURATION: 97 % | HEART RATE: 80 BPM | SYSTOLIC BLOOD PRESSURE: 115 MMHG

## 2025-01-11 PROBLEM — R00.2 PALPITATIONS: Status: ACTIVE | Noted: 2025-01-11

## 2025-01-11 LAB
ANION GAP SERPL CALC-SCNC: 11 MEQ/L (ref 8–16)
BUN SERPL-MCNC: 16 MG/DL (ref 7–22)
CALCIUM SERPL-MCNC: 8.4 MG/DL (ref 8.5–10.5)
CHLORIDE SERPL-SCNC: 99 MEQ/L (ref 98–111)
CO2 SERPL-SCNC: 25 MEQ/L (ref 23–33)
CREAT SERPL-MCNC: 0.6 MG/DL (ref 0.4–1.2)
DEPRECATED RDW RBC AUTO: 65 FL (ref 35–45)
EKG ATRIAL RATE: 96 BPM
EKG P AXIS: 46 DEGREES
EKG P-R INTERVAL: 128 MS
EKG Q-T INTERVAL: 356 MS
EKG QRS DURATION: 90 MS
EKG QTC CALCULATION (BAZETT): 449 MS
EKG R AXIS: -30 DEGREES
EKG T AXIS: 40 DEGREES
EKG VENTRICULAR RATE: 96 BPM
ERYTHROCYTE [DISTWIDTH] IN BLOOD BY AUTOMATED COUNT: 23 % (ref 11.5–14.5)
FERRITIN SERPL IA-MCNC: 1512 NG/ML (ref 22–322)
FOLATE SERPL-MCNC: 16.5 NG/ML (ref 4.8–24.2)
GFR SERPL CREATININE-BSD FRML MDRD: > 90 ML/MIN/1.73M2
GLUCOSE SERPL-MCNC: 112 MG/DL (ref 70–108)
HCT VFR BLD AUTO: 27.1 % (ref 42–52)
HGB BLD-MCNC: 7.6 GM/DL (ref 14–18)
HGB RETIC QN AUTO: 31.2 PG (ref 28.2–35.7)
IMM RETICS NFR: 4.7 % (ref 2.3–13.4)
IRON SATN MFR SERPL: 59 % (ref 20–50)
IRON SERPL-MCNC: 170 UG/DL (ref 65–195)
MCH RBC QN AUTO: 22.4 PG (ref 26–33)
MCHC RBC AUTO-ENTMCNC: 28 GM/DL (ref 32.2–35.5)
MCV RBC AUTO: 79.7 FL (ref 80–94)
OSMOLALITY SERPL CALC.SUM OF ELEC: 272 MOSMOL/KG (ref 275–300)
PLATELET # BLD AUTO: 330 THOU/MM3 (ref 130–400)
PMV BLD AUTO: 9.1 FL (ref 9.4–12.4)
POTASSIUM SERPL-SCNC: 4.2 MEQ/L (ref 3.5–5.2)
RBC # BLD AUTO: 3.4 MILL/MM3 (ref 4.7–6.1)
RETICS # AUTO: 34 THOU/MM3 (ref 20–115)
RETICS/RBC NFR AUTO: 0.9 % (ref 0.5–2)
SCAN OF BLOOD SMEAR: NORMAL
SODIUM SERPL-SCNC: 135 MEQ/L (ref 135–145)
TIBC SERPL-MCNC: 288 UG/DL (ref 171–450)
VIT B12 SERPL-MCNC: 279 PG/ML (ref 211–911)
WBC # BLD AUTO: 7.5 THOU/MM3 (ref 4.8–10.8)

## 2025-01-11 PROCEDURE — 36415 COLL VENOUS BLD VENIPUNCTURE: CPT

## 2025-01-11 PROCEDURE — 99223 1ST HOSP IP/OBS HIGH 75: CPT | Performed by: INTERNAL MEDICINE

## 2025-01-11 PROCEDURE — 2500000003 HC RX 250 WO HCPCS

## 2025-01-11 PROCEDURE — 6360000002 HC RX W HCPCS

## 2025-01-11 PROCEDURE — 93010 ELECTROCARDIOGRAM REPORT: CPT | Performed by: INTERNAL MEDICINE

## 2025-01-11 PROCEDURE — 6370000000 HC RX 637 (ALT 250 FOR IP): Performed by: INTERNAL MEDICINE

## 2025-01-11 PROCEDURE — 6370000000 HC RX 637 (ALT 250 FOR IP)

## 2025-01-11 PROCEDURE — 80048 BASIC METABOLIC PNL TOTAL CA: CPT

## 2025-01-11 PROCEDURE — 85027 COMPLETE CBC AUTOMATED: CPT

## 2025-01-11 PROCEDURE — 82746 ASSAY OF FOLIC ACID SERUM: CPT

## 2025-01-11 PROCEDURE — 82607 VITAMIN B-12: CPT

## 2025-01-11 PROCEDURE — 85046 RETICYTE/HGB CONCENTRATE: CPT

## 2025-01-11 PROCEDURE — G0378 HOSPITAL OBSERVATION PER HR: HCPCS

## 2025-01-11 RX ORDER — OXYCODONE HYDROCHLORIDE 5 MG/1
5 TABLET ORAL EVERY 4 HOURS PRN
Status: DISCONTINUED | OUTPATIENT
Start: 2025-01-11 | End: 2025-01-11 | Stop reason: HOSPADM

## 2025-01-11 RX ADMIN — OXYCODONE 5 MG: 5 TABLET ORAL at 14:24

## 2025-01-11 RX ADMIN — CALCIUM POLYCARBOPHIL 625 MG: 625 TABLET, FILM COATED ORAL at 08:43

## 2025-01-11 RX ADMIN — SIROLIMUS 1 MG: 1 TABLET ORAL at 08:43

## 2025-01-11 RX ADMIN — OXYCODONE 5 MG: 5 TABLET ORAL at 09:58

## 2025-01-11 RX ADMIN — SODIUM CHLORIDE, PRESERVATIVE FREE 10 ML: 5 INJECTION INTRAVENOUS at 08:44

## 2025-01-11 RX ADMIN — OXYCODONE HYDROCHLORIDE 5 MG: 5 TABLET ORAL at 05:03

## 2025-01-11 RX ADMIN — SODIUM CHLORIDE, PRESERVATIVE FREE 10 ML: 5 INJECTION INTRAVENOUS at 00:02

## 2025-01-11 RX ADMIN — METOPROLOL TARTRATE 25 MG: 25 TABLET, FILM COATED ORAL at 00:01

## 2025-01-11 RX ADMIN — METOPROLOL TARTRATE 25 MG: 25 TABLET, FILM COATED ORAL at 08:43

## 2025-01-11 RX ADMIN — APIXABAN 5 MG: 5 TABLET, FILM COATED ORAL at 00:01

## 2025-01-11 RX ADMIN — PANTOPRAZOLE SODIUM 40 MG: 40 TABLET, DELAYED RELEASE ORAL at 05:03

## 2025-01-11 RX ADMIN — FERROUS SULFATE TAB 325 MG (65 MG ELEMENTAL FE) 325 MG: 325 (65 FE) TAB at 08:43

## 2025-01-11 RX ADMIN — FUROSEMIDE 20 MG: 20 TABLET ORAL at 08:43

## 2025-01-11 RX ADMIN — PREDNISONE 10 MG: 10 TABLET ORAL at 08:43

## 2025-01-11 RX ADMIN — APIXABAN 5 MG: 5 TABLET, FILM COATED ORAL at 08:43

## 2025-01-11 ASSESSMENT — PAIN DESCRIPTION - PAIN TYPE
TYPE: ACUTE PAIN;CHRONIC PAIN
TYPE: ACUTE PAIN

## 2025-01-11 ASSESSMENT — PAIN DESCRIPTION - ORIENTATION
ORIENTATION: RIGHT

## 2025-01-11 ASSESSMENT — PAIN DESCRIPTION - LOCATION
LOCATION: SHOULDER

## 2025-01-11 ASSESSMENT — PAIN DESCRIPTION - DESCRIPTORS
DESCRIPTORS: ACHING;DISCOMFORT
DESCRIPTORS: ACHING
DESCRIPTORS: ACHING;DISCOMFORT
DESCRIPTORS: ACHING;DISCOMFORT

## 2025-01-11 ASSESSMENT — PAIN - FUNCTIONAL ASSESSMENT
PAIN_FUNCTIONAL_ASSESSMENT: ACTIVITIES ARE NOT PREVENTED

## 2025-01-11 ASSESSMENT — PAIN SCALES - GENERAL
PAINLEVEL_OUTOF10: 6
PAINLEVEL_OUTOF10: 5
PAINLEVEL_OUTOF10: 5
PAINLEVEL_OUTOF10: 7
PAINLEVEL_OUTOF10: 6

## 2025-01-11 ASSESSMENT — PAIN SCALES - WONG BAKER: WONGBAKER_NUMERICALRESPONSE: NO HURT

## 2025-01-11 NOTE — PROGRESS NOTES
Hospitalist Progress Note      Patient:  Ousmane Lockett    Unit/Bed:8B-25/025-A  YOB: 1965  MRN: [id number]   Acct: [id number]   PCP: Daniel Barbosa MD  Date of Admission: 1/10/2025   See H& P from 1/10/25  Assessment/Plan:  Atrial fibrillation with palpitations normal sinus rhythm   No physical signs of pericardial effusion  Hold off on any echo at this time  Consult his cardiologist if patient continues to be stable may be discharged later today if okay with cardiology     Chronic pain secondary to mets in the shoulder  Will adjust his narcotics to his home dose of every 4 hours.  Patient currently on 1 tablet but if pain still persistent may increase it to 2 tablets.  Bowel control to avoid narcotic induced constipation     Stage IV non-small cell lung cancer with mets to right humeral head and now with liver lesions on pembrolizuamb and peme/carbo   last chemo was last Tuesday  Will follow-up with this oncologist outpatient    Status post Pleurx catheter for pleural effusion on the right side  Patient aware of draining his pleural bag    Status post reverse right shoulder replacement for metastatic lesion in the humerus now with limited activity on the right secondary to pain  Increase his narcotic to his home dose    Hyperlipidemia   To follow-up with his family physician for monitoring his lipids    Hypertension   Continue with beta-blockers    H/o NHL  s/p chemo and radiation and bone marrow transplant    Status post renal transplant on immunosuppressive agents  Continue with immunosuppressive agents  Monitor his creatinine closely while on chemotherapy    Melanoma on scalp s/p excision   Follow-up with Derm as outpatient for surveillance    Anemia  Suspect secondary to chemo  Monitor hemoglobin      LDA: []CVC / []PICC / []Midline / []Sibley / []Drains / []Mediport / [x]None  Antibiotics: None  Steroids: On chronic steroids for his

## 2025-01-11 NOTE — PROGRESS NOTES
Patient discharge instructions reviewed with patient. IV removed. Belongings reviewed. Patient will leave facility via private transportation.

## 2025-01-11 NOTE — PLAN OF CARE
Problem: Chronic Conditions and Co-morbidities  Goal: Patient's chronic conditions and co-morbidity symptoms are monitored and maintained or improved  Outcome: Progressing     Problem: Discharge Planning  Goal: Discharge to home or other facility with appropriate resources  Outcome: Progressing  Flowsheets (Taken 1/10/2025 [id number] by Yomi Cardenas, RN)  Discharge to home or other facility with appropriate resources: Identify barriers to discharge with patient and caregiver     Problem: Skin/Tissue Integrity  Goal: Absence of new skin breakdown  Description: 1.  Monitor for areas of redness and/or skin breakdown  2.  Assess vascular access sites hourly  3.  Every 4-6 hours minimum:  Change oxygen saturation probe site  4.  Every 4-6 hours:  If on nasal continuous positive airway pressure, respiratory therapy assess nares and determine need for appliance change or resting period.  Outcome: Progressing     Problem: Safety - Adult  Goal: Free from fall injury  Outcome: Progressing

## 2025-01-11 NOTE — DISCHARGE SUMMARY
Hospitalist Discharge Summary        Patient: Ousmane Lockett  YOB: 1965  MRN: [id number]   Acct: [id number]    Primary Care Physician: Daniel Barbosa MD    Admit date  1/10/2025    Discharge date:  1/11/2025 2:12 PM    Chief Complaint on presentation :-    Palpitation    Discharge Assessment and Plan:-   Atrial fibrillation with palpitations normal sinus rhythm   Chronic pain secondary to mets in the shoulder   Stage IV non-small cell lung cancer with mets to right humeral head and now with liver lesions on pembrolizuamb and peme/carbo    Status post Pleurx catheter for pleural effusion on the right side   Status post reverse right shoulder replacement for metastatic lesion in the humerus now with limited activity on the right secondary to pain   Hyperlipidemia   Hypertension   H/o NHL  s/p chemo and radiation and bone marrow transplant  Status post renal transplant on immunosuppressive agents   Melanoma on scalp s/p excision   Anemia      Initial H and P and Hospital course:-  59-year-old with past medical history of chronic atrial fibrillation paroxysmal stage IV known small cell lung cancer currently on both chemo and immunotherapy presenting with palpitations.  Patient had no chest tightness but did have some shortness of breath.  Workup in the ER he was in A-fib but rate was improving.  Was continued on his beta-blockers  Patient converted to sinus rhythm once he was on the floor.  He had no further symptoms.  He was seen by cardiology and was okay for discharge.  Patient continued to be in sinus rhythm.  Heart sounds were normal and no voltage criteria noted on EKG  No further echo needed at this time.  He will continue with his Pleurx catheter on his left side.    Physical Exam:-  Vitals:   Patient Vitals for the past 24 hrs:   BP Temp Temp src Pulse Resp SpO2 Height Weight   01/11/25 1158 115/61 98.7 °F (37.1 °C) Oral 80 17 97 % -- --   01/11/25 1028 -- -- -- -- 17 -- -- --  (H) 70 - 108 mg/dL    Creatinine 0.7 0.4 - 1.2 mg/dL    BUN 19 7 - 22 mg/dL    Sodium 135 135 - 145 meq/L    Potassium reflex Magnesium 4.5 3.5 - 5.2 meq/L    Chloride 100 98 - 111 meq/L    CO2 24 23 - 33 meq/L    Calcium 8.6 8.5 - 10.5 mg/dL    AST 21 5 - 40 U/L    Alkaline Phosphatase 159 (H) 38 - 126 U/L    Total Protein 5.4 (L) 6.1 - 8.0 g/dL    Albumin 3.1 (L) 3.5 - 5.1 g/dL    Total Bilirubin 0.3 0.3 - 1.2 mg/dL    ALT 35 11 - 66 U/L   Troponin    Collection Time: 01/10/25  4:14 PM   Result Value Ref Range    Troponin, High Sensitivity 34 (H) 0 - 12 ng/L   TSH reflex to FT4    Collection Time: 01/10/25  4:14 PM   Result Value Ref Range    TSH 1.510 0.400 - 4.200 uIU/mL   Scan of Blood Smear    Collection Time: 01/10/25  4:14 PM   Result Value Ref Range    SCAN OF BLOOD SMEAR see below    Glomerular Filtration Rate, Estimated    Collection Time: 01/10/25  4:14 PM   Result Value Ref Range    Est, Glom Filt Rate > 90 >60 ml/min/1.73m2   Osmolality    Collection Time: 01/10/25  4:14 PM   Result Value Ref Range    Osmolality Calc 273.8 (L) 275.0 - 300.0 mOsmol/kg   D-Dimer, Quantitative    Collection Time: 01/10/25  4:14 PM   Result Value Ref Range    D-Dimer, Quant 1155.00 (H) 0.00 - 500.00 ng/ml FEU   Anion Gap    Collection Time: 01/10/25  4:14 PM   Result Value Ref Range    Anion Gap 11.0 8.0 - 16.0 meq/L   COVID-19 & Influenza Combo    Collection Time: 01/10/25  5:22 PM    Specimen: Nasopharyngeal Swab   Result Value Ref Range    SARS-CoV-2 RNA, RT PCR NOT DETECTED NOT DETECTED    Influenza A NOT DETECTED NOT DETECTED    Influenza B NOT DETECTED NOT DETECTED   Urinalysis with Reflex to Culture    Collection Time: 01/10/25  5:24 PM    Specimen: Urine   Result Value Ref Range    Glucose, Ur NEGATIVE NEGATIVE mg/dl    Bilirubin, Urine NEGATIVE NEGATIVE    Ketones, Urine NEGATIVE NEGATIVE    Specific Gravity, UA 1.018 1.002 - 1.030    Blood, Urine NEGATIVE NEGATIVE    pH, Urine 6.5 5.0 - 9.0    Protein, UA

## 2025-01-11 NOTE — H&P
CAPS   Yes No   Sig: Take by mouth   Misc Natural Products (GLUCOSAMINE CHOND COMPLEX/MSM PO)   Yes No   Sig: Take  by mouth.   acyclovir (ZOVIRAX) 200 MG capsule Taking Differently  Yes Yes   Sig: Take by mouth as needed   apixaban (ELIQUIS) 5 MG TABS tablet 1/10/2025  No Yes   Sig: Take 1 tablet by mouth 2 times daily   ferrous sulfate (IRON 325) 325 (65 Fe) MG tablet   No No   Sig: Take 1 tablet by mouth 2 times daily (with meals)   furosemide (LASIX) 20 MG tablet 1/10/2025  No Yes   Sig: Take 1 tablet by mouth daily   metoprolol tartrate (LOPRESSOR) 25 MG tablet 1/10/2025  No Yes   Sig: Take 1 tablet by mouth 2 times daily   oxyCODONE (ROXICODONE) 5 MG immediate release tablet 1/10/2025  Yes Yes   Sig: Take 1 tablet by mouth every 6 hours as needed.   pantoprazole (PROTONIX) 40 MG tablet 1/10/2025  No Yes   Sig: Take 1 tablet by mouth every morning (before breakfast)   polycarbophil (FIBERCON) 625 MG tablet   Yes No   Sig: Take 1 tablet by mouth daily   predniSONE (DELTASONE) 10 MG tablet 1/10/2025  No Yes   Sig: TAKE 1 TABLET DAILY   sirolimus (RAPAMUNE) 1 MG tablet   Yes No   Sig: Take 1 tablet by mouth daily      Facility-Administered Medications: None     Allergies:  Patient has no known allergies.    Past Medical, Family, Social, Surgical Hx      Diagnosis Date    Cancer (HCC)     CVA (cerebral vascular accident) (HCC)     with chemo - residual numbness in left hand    Hematoma     right leg s/p fall    Hx of blood clots     Arm    Hyperlipidemia     Hypertension     Irregular heart rate 11/30/2024    Non Hodgkin's lymphoma (HCC)     1995/96    NSCLC of right lung (HCC) 07/03/2024    Pneumonia 09/29/2024    Renal transplant recipient     [facility] transplant follows-Demetri    Sepsis due to pneumonia (HCC) 11/30/2024    Status post surgical removal of malignant neoplasm of skin 2020    Stroke (HCC) 1996         Procedure Laterality Date    APPENDECTOMY      1972    BONE MARROW TRANSPLANT  1996    [facility]  Year: 0     Homeless in the Last Year: No        Code status: Full Code     Electronically signed by Daniel Meredith DO on 1/10/2025 at 10:13 PM.   Case was discussed with the Attending, Catia Dan MD .

## 2025-01-11 NOTE — PROGRESS NOTES
Pt admitted to  [facility] via from ED from ED.  Complaints: Shortness of breath.    IV none infusing into the forearm left, condition patent and no redness at a rate of 0 mls/ hour.    IV site free of s/s of infection or infiltration. Vital signs obtained. Assessment and data collection initiated. Two nurse skin assessment performed by Yomi CORBIN and Margy RN.   Oriented to room. Policies and procedures for [facility] explained. All questions answered with no further questions at this time. Fall prevention and safety brochure discussed with patient.  Bed alarm on. Call light in reach.Oriented to room.   Yomi Cardenas RN, RN 1/11/2025 5:21 AM     Explained patients right to have family, representative or physician notified of their admission.  Patient has Declined for physician to be notified.  Patient has Declined for family/representative to be notified.    Patient would like family notified once per shift? yes, **Relation, Name, Phone Number, OK to share HIPPA code?  Monica, wife.

## 2025-01-11 NOTE — ED NOTES
Called [facility] spoke with Norma who approved patient transport to [facility]. Patient is in stable condition.

## 2025-01-11 NOTE — CONSULTS
[facility]  STRZ MED SURG 8AB  [address] [facility] [address]  Dept: [phone]  Loc: [phone]     Name: Ousmane Lockett  Account number: [id number]  Medical Record Unit Number: [id number]  : 1965  Admit Date 1/10/2025  Consult Note Date 2025  Reason for Consult:  Paroxysmal atrial   Requesting Physician: Catia Bazzi MD      CHIEF COMPLAINT:  Palpitations  Heart racing.  Paroxysmal atrial fibrillation.    HISTORY OF PRESENT ILLNESS:      The patient is a 59 y.o. white male who was admitted for further evaluation of palpitations and paroxysmal atrial fibrillation.  Patient was hospitalized last month with paroxysmal atrial fibrillation.  He has multiple medical problems.  The patient does not have a history of CAD, MI, CHF or angina.  He has metastatic non-small cell lung cancer diagnosed in 2024.  He also has had a kidney transplant  and is on immunosuppressive therapy for that also.  Patient unfortunately has bone metastases and had a right shoulder surgery.  He did not do well with that and has a lot of problems with his right arm.    The patient denies any chest pain, pressure, squeezing, tightness or discomfort with exertion.  He denies any shortness of breath at rest.  He has some mild dyspnea on exertion at times.  He has palpitations and heart racing off and on.  He has a known paroxysmal atrial fibrillation and is being treated for that.  No recent dizziness or lightheadedness.  No syncope.    The patient has a history of hypertension.  No diabetes.  He has a history of hyperlipidemia.  He has never been a smoker.  There is a positive family history of CAD.  He has a history of a right arm clot in the past.    Past Medical History:  Past Medical History:   Diagnosis Date    Cancer (HCC)     CVA (cerebral vascular accident) (HCC)     with chemo - residual numbness in left hand    Hematoma     right leg s/p fall    Hx of blood  distortion in the right lung. Status  post right lobectomy, favored to be right lower lobectomy. There is  peribronchovascular thickening of the right hilum. This causes narrowing  of the airways. A discrete mass is not identified. In what is expected to  be right middle lobe there is a spiculated nodule measuring 1.1 x 1.5 x  1.6 cm, increased in size since 9/22/24, previously measuring to 0.9 cm.  No pneumothorax. Small right pleural effusion status post PleurX catheter.    Heterogeneity of the bone marrow, likely metastatic disease, most  prominent in the thoracic vertebra, manubrium and sternum. Nondisplaced  fracture of the right 7th rib at the posterolateral aspect, favored to be  subacute, also present on the prior study. Gynecomastia. Left posterior  lower neck intramuscular lipoma. Right shoulder arthroplasty hardware..  Greater than 6 attenuating lesions in the liver which measure up to 3.0  cm, new since 9/29/24, presumed to be metastatic disease. Low attenuating  lesion in the spleen, indeterminate. Trace ascites in the left upper  quadrant, new. Atrophic native kidneys.    Impression  Impression:  No pulmonary embolism.  Peribronchial vascular thickening of the right hilum which causes  narrowing of the airways, secondary to malignancy and postsurgical/post  treatment change. 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  Right-sided spiculated nodule has increased in size and is favored to be  metastatic disease, currently measuring up to 1.6 cm.  Numerous liver lesions, metastatic disease.  Trace ascites in the abdomen, new.    This document has been electronically signed by: Marcy Quispe MD  on 01/10/2025 09:07 PM    All CTs at this facility use dose modulation techniques and iterative  reconstructions, and/or weight-based dosing  when appropriate to reduce radiation to a low as reasonably achievable.    3D

## 2025-01-11 NOTE — ED NOTES
ED to inpatient nurses report      Chief Complaint:  Chief Complaint   Patient presents with    Palpitations     Present to ED from: Home    MOA:     LOC: alert and orientated to name, place, date  Mobility: Requires assistance * 1  Oxygen Baseline: RA    Current needs required: RA     Code Status:   Full Code    What abnormal results were found and what did you give/do to treat them? Palpitations; A-fib; CP;   Any procedures or intervention occur? CTA; EKG; Analgesics    Mental Status:       Psych Assessment:        Vitals:  Patient Vitals for the past 24 hrs:   BP Temp Temp src Pulse Resp SpO2   01/10/25 2225 125/62 -- -- 99 13 96 %   01/10/25 2150 121/63 -- -- 94 20 94 %   01/10/25 2035 (!) 114/58 -- -- 88 17 98 %   01/10/25 1955 (!) 117/59 -- -- 86 22 98 %   01/10/25 1940 (!) 116/59 -- -- 88 16 99 %   01/10/25 1855 (!) 117/58 -- -- 86 17 98 %   01/10/25 1755 (!) 111/59 -- -- 88 29 97 %   01/10/25 1655 101/63 -- -- 78 17 97 %   01/10/25 1555 113/60 98 °F (36.7 °C) Oral 85 19 98 %        LDAs:   Peripheral IV 01/10/25 Distal;Left;Anterior Cephalic (Active)       Ambulatory Status:  No data recorded    Diagnosis:  DISPOSITION Admitted 01/10/2025 10:06:25 PM   Final diagnoses:   Palpitations   Chest pain, unspecified type   Pulmonary nodules        Consults:  None     Pain Score:  Pain Assessment  Pain Assessment: 0-10  Pain Level: 2  Pain Location: Chest, Shoulder  Pain Orientation: Right    C-SSRS:   Risk of Suicide: No Risk    Sepsis Screening:       [place] Fall Risk:       Swallow Screening        Preferred Language:   English      ALLERGIES     Patient has no known allergies.    SURGICAL HISTORY       Past Surgical History:   Procedure Laterality Date    APPENDECTOMY      1972    BONE MARROW TRANSPLANT  1996    [facility] transplant once a year    CHOLECYSTECTOMY, LAPAROSCOPIC N/A 11/19/2021    CHOLECYSTECTOMY LAPAROSCOPIC performed by Abdi Carmona MD at [facility] OR    COLONOSCOPY      FACIAL SURGERY N/A 10/28/2020

## 2025-01-12 ENCOUNTER — HOSPITAL ENCOUNTER (INPATIENT)
Age: 60
LOS: 2 days | Discharge: HOME | DRG: 309 | End: 2025-01-14
Attending: FAMILY MEDICINE | Admitting: INTERNAL MEDICINE
Payer: COMMERCIAL

## 2025-01-12 ENCOUNTER — APPOINTMENT (OUTPATIENT)
Dept: GENERAL RADIOLOGY | Age: 60
DRG: 309 | End: 2025-01-12
Payer: COMMERCIAL

## 2025-01-12 ENCOUNTER — PATIENT MESSAGE (OUTPATIENT)
Dept: ONCOLOGY | Age: 60
End: 2025-01-12

## 2025-01-12 DIAGNOSIS — C34.90 METASTATIC NON-SMALL CELL LUNG CANCER (HCC): ICD-10-CM

## 2025-01-12 DIAGNOSIS — C80.1 METASTATIC CARCINOMA INVOLVING BONE WITH UNKNOWN PRIMARY SITE (HCC): ICD-10-CM

## 2025-01-12 DIAGNOSIS — I47.20 VENTRICULAR TACHYCARDIA (HCC): ICD-10-CM

## 2025-01-12 DIAGNOSIS — R00.0 TACHYARRHYTHMIA: Primary | ICD-10-CM

## 2025-01-12 DIAGNOSIS — E87.1 HYPONATREMIA: ICD-10-CM

## 2025-01-12 DIAGNOSIS — R79.89 ELEVATED TROPONIN: ICD-10-CM

## 2025-01-12 DIAGNOSIS — C79.51 METASTATIC CARCINOMA INVOLVING BONE WITH UNKNOWN PRIMARY SITE (HCC): ICD-10-CM

## 2025-01-12 LAB
ALBUMIN SERPL BCG-MCNC: 2.6 G/DL (ref 3.5–5.1)
ALP SERPL-CCNC: 132 U/L (ref 38–126)
ALT SERPL W/O P-5'-P-CCNC: 40 U/L (ref 11–66)
ANION GAP SERPL CALC-SCNC: 11 MEQ/L (ref 8–16)
ANISOCYTOSIS BLD QL SMEAR: PRESENT
AST SERPL-CCNC: 20 U/L (ref 5–40)
BACTERIA BLD AEROBE CULT: NORMAL
BACTERIA BLD AEROBE CULT: NORMAL
BASOPHILS ABSOLUTE: 0 THOU/MM3 (ref 0–0.1)
BASOPHILS NFR BLD AUTO: 0.1 %
BILIRUB CONJ SERPL-MCNC: 0.2 MG/DL (ref 0.1–13.8)
BILIRUB SERPL-MCNC: 0.4 MG/DL (ref 0.3–1.2)
BUN SERPL-MCNC: 15 MG/DL (ref 7–22)
CALCIUM SERPL-MCNC: 8.6 MG/DL (ref 8.5–10.5)
CHLORIDE SERPL-SCNC: 95 MEQ/L (ref 98–111)
CO2 SERPL-SCNC: 25 MEQ/L (ref 23–33)
CREAT SERPL-MCNC: 0.7 MG/DL (ref 0.4–1.2)
DEPRECATED RDW RBC AUTO: 64.8 FL (ref 35–45)
EKG ATRIAL RATE: 170 BPM
EKG ATRIAL RATE: 86 BPM
EKG P AXIS: 36 DEGREES
EKG P-R INTERVAL: 134 MS
EKG Q-T INTERVAL: 292 MS
EKG Q-T INTERVAL: 368 MS
EKG QRS DURATION: 86 MS
EKG QRS DURATION: 96 MS
EKG QTC CALCULATION (BAZETT): 440 MS
EKG QTC CALCULATION (BAZETT): 450 MS
EKG R AXIS: -36 DEGREES
EKG R AXIS: -36 DEGREES
EKG T AXIS: 20 DEGREES
EKG T AXIS: 59 DEGREES
EKG VENTRICULAR RATE: 143 BPM
EKG VENTRICULAR RATE: 86 BPM
EOSINOPHIL NFR BLD AUTO: 0.9 %
EOSINOPHILS ABSOLUTE: 0.1 THOU/MM3 (ref 0–0.4)
ERYTHROCYTE [DISTWIDTH] IN BLOOD BY AUTOMATED COUNT: 22.5 % (ref 11.5–14.5)
GFR SERPL CREATININE-BSD FRML MDRD: > 90 ML/MIN/1.73M2
GLUCOSE SERPL-MCNC: 143 MG/DL (ref 70–108)
HCT VFR BLD AUTO: 28.5 % (ref 42–52)
HGB BLD-MCNC: 8.1 GM/DL (ref 14–18)
HYPOCHROMIA BLD QL SMEAR: PRESENT
IMM GRANULOCYTES # BLD AUTO: 0.06 THOU/MM3 (ref 0–0.07)
IMM GRANULOCYTES NFR BLD AUTO: 0.6 %
LACTIC ACID, SEPSIS: 1.2 MMOL/L (ref 0.5–1.9)
LACTIC ACID, SEPSIS: 1.7 MMOL/L (ref 0.5–1.9)
LYMPHOCYTES ABSOLUTE: 2.9 THOU/MM3 (ref 1–4.8)
LYMPHOCYTES NFR BLD AUTO: 26.9 %
MAGNESIUM SERPL-MCNC: 1.7 MG/DL (ref 1.6–2.4)
MCH RBC QN AUTO: 22.8 PG (ref 26–33)
MCHC RBC AUTO-ENTMCNC: 28.4 GM/DL (ref 32.2–35.5)
MCV RBC AUTO: 80.3 FL (ref 80–94)
MONOCYTES ABSOLUTE: 0.2 THOU/MM3 (ref 0.4–1.3)
MONOCYTES NFR BLD AUTO: 1.5 %
NEUTROPHILS ABSOLUTE: 7.4 THOU/MM3 (ref 1.8–7.7)
NEUTROPHILS NFR BLD AUTO: 70 %
NRBC BLD AUTO-RTO: 0 /100 WBC
NT-PROBNP SERPL IA-MCNC: 585.8 PG/ML (ref 0–124)
OSMOLALITY SERPL CALC.SUM OF ELEC: 266 MOSMOL/KG (ref 275–300)
OSMOLALITY SERPL: 288 MOSMOL/KG (ref 275–295)
OSMOLALITY UR: 695 MOSMOL/KG (ref 250–750)
PLATELET # BLD AUTO: 320 THOU/MM3 (ref 130–400)
PMV BLD AUTO: 8.9 FL (ref 9.4–12.4)
POTASSIUM SERPL-SCNC: 4.2 MEQ/L (ref 3.5–5.2)
PROT SERPL-MCNC: 4.8 G/DL (ref 6.1–8)
RBC # BLD AUTO: 3.55 MILL/MM3 (ref 4.7–6.1)
SODIUM SERPL-SCNC: 129 MEQ/L (ref 135–145)
SODIUM SERPL-SCNC: 131 MEQ/L (ref 135–145)
SODIUM SERPL-SCNC: 131 MEQ/L (ref 135–145)
SODIUM UR-SCNC: 106 MEQ/L
TROPONIN, HIGH SENSITIVITY: 40 NG/L (ref 0–12)
TROPONIN, HIGH SENSITIVITY: 45 NG/L (ref 0–12)
WBC # BLD AUTO: 10.6 THOU/MM3 (ref 4.8–10.8)

## 2025-01-12 PROCEDURE — 99285 EMERGENCY DEPT VISIT HI MDM: CPT

## 2025-01-12 PROCEDURE — 84295 ASSAY OF SERUM SODIUM: CPT

## 2025-01-12 PROCEDURE — 36415 COLL VENOUS BLD VENIPUNCTURE: CPT

## 2025-01-12 PROCEDURE — 1200000003 HC TELEMETRY R&B

## 2025-01-12 PROCEDURE — 6360000002 HC RX W HCPCS

## 2025-01-12 PROCEDURE — 93005 ELECTROCARDIOGRAM TRACING: CPT

## 2025-01-12 PROCEDURE — 93010 ELECTROCARDIOGRAM REPORT: CPT | Performed by: INTERNAL MEDICINE

## 2025-01-12 PROCEDURE — 2500000003 HC RX 250 WO HCPCS: Performed by: FAMILY MEDICINE

## 2025-01-12 PROCEDURE — 80076 HEPATIC FUNCTION PANEL: CPT

## 2025-01-12 PROCEDURE — 99223 1ST HOSP IP/OBS HIGH 75: CPT | Performed by: STUDENT IN AN ORGANIZED HEALTH CARE EDUCATION/TRAINING PROGRAM

## 2025-01-12 PROCEDURE — 93005 ELECTROCARDIOGRAM TRACING: CPT | Performed by: FAMILY MEDICINE

## 2025-01-12 PROCEDURE — 2580000003 HC RX 258: Performed by: FAMILY MEDICINE

## 2025-01-12 PROCEDURE — 83935 ASSAY OF URINE OSMOLALITY: CPT

## 2025-01-12 PROCEDURE — 83735 ASSAY OF MAGNESIUM: CPT

## 2025-01-12 PROCEDURE — 84484 ASSAY OF TROPONIN QUANT: CPT

## 2025-01-12 PROCEDURE — 2500000003 HC RX 250 WO HCPCS

## 2025-01-12 PROCEDURE — 71045 X-RAY EXAM CHEST 1 VIEW: CPT

## 2025-01-12 PROCEDURE — 84300 ASSAY OF URINE SODIUM: CPT

## 2025-01-12 PROCEDURE — 83605 ASSAY OF LACTIC ACID: CPT

## 2025-01-12 PROCEDURE — 80048 BASIC METABOLIC PNL TOTAL CA: CPT

## 2025-01-12 PROCEDURE — 83930 ASSAY OF BLOOD OSMOLALITY: CPT

## 2025-01-12 PROCEDURE — 85025 COMPLETE CBC W/AUTO DIFF WBC: CPT

## 2025-01-12 PROCEDURE — 96374 THER/PROPH/DIAG INJ IV PUSH: CPT

## 2025-01-12 PROCEDURE — 6370000000 HC RX 637 (ALT 250 FOR IP)

## 2025-01-12 PROCEDURE — 99223 1ST HOSP IP/OBS HIGH 75: CPT | Performed by: INTERNAL MEDICINE

## 2025-01-12 PROCEDURE — 83880 ASSAY OF NATRIURETIC PEPTIDE: CPT

## 2025-01-12 RX ORDER — SODIUM CHLORIDE 0.9 % (FLUSH) 0.9 %
5-40 SYRINGE (ML) INJECTION EVERY 12 HOURS SCHEDULED
Status: DISCONTINUED | OUTPATIENT
Start: 2025-01-12 | End: 2025-01-14 | Stop reason: HOSPADM

## 2025-01-12 RX ORDER — 0.9 % SODIUM CHLORIDE 0.9 %
1000 INTRAVENOUS SOLUTION INTRAVENOUS ONCE
Status: COMPLETED | OUTPATIENT
Start: 2025-01-12 | End: 2025-01-12

## 2025-01-12 RX ORDER — METOPROLOL TARTRATE 50 MG
25 TABLET ORAL 2 TIMES DAILY
Status: DISCONTINUED | OUTPATIENT
Start: 2025-01-12 | End: 2025-01-12

## 2025-01-12 RX ORDER — ACYCLOVIR 200 MG/1
200 CAPSULE ORAL PRN
Status: DISCONTINUED | OUTPATIENT
Start: 2025-01-12 | End: 2025-01-14 | Stop reason: HOSPADM

## 2025-01-12 RX ORDER — PREDNISONE 10 MG/1
10 TABLET ORAL DAILY
Status: DISCONTINUED | OUTPATIENT
Start: 2025-01-12 | End: 2025-01-14 | Stop reason: HOSPADM

## 2025-01-12 RX ORDER — SODIUM CHLORIDE 0.9 % (FLUSH) 0.9 %
5-40 SYRINGE (ML) INJECTION PRN
Status: DISCONTINUED | OUTPATIENT
Start: 2025-01-12 | End: 2025-01-14 | Stop reason: HOSPADM

## 2025-01-12 RX ORDER — FERROUS SULFATE 325(65) MG
325 TABLET ORAL 2 TIMES DAILY WITH MEALS
Status: DISCONTINUED | OUTPATIENT
Start: 2025-01-12 | End: 2025-01-14 | Stop reason: HOSPADM

## 2025-01-12 RX ORDER — FUROSEMIDE 40 MG/1
20 TABLET ORAL DAILY
Status: DISCONTINUED | OUTPATIENT
Start: 2025-01-12 | End: 2025-01-14 | Stop reason: HOSPADM

## 2025-01-12 RX ORDER — MAGNESIUM SULFATE IN WATER 40 MG/ML
2000 INJECTION, SOLUTION INTRAVENOUS ONCE
Status: COMPLETED | OUTPATIENT
Start: 2025-01-12 | End: 2025-01-12

## 2025-01-12 RX ORDER — SODIUM CHLORIDE 9 MG/ML
INJECTION, SOLUTION INTRAVENOUS PRN
Status: DISCONTINUED | OUTPATIENT
Start: 2025-01-12 | End: 2025-01-14 | Stop reason: HOSPADM

## 2025-01-12 RX ORDER — ONDANSETRON 4 MG/1
4 TABLET, ORALLY DISINTEGRATING ORAL EVERY 8 HOURS PRN
Status: DISCONTINUED | OUTPATIENT
Start: 2025-01-12 | End: 2025-01-14 | Stop reason: HOSPADM

## 2025-01-12 RX ORDER — POTASSIUM CHLORIDE 7.45 MG/ML
10 INJECTION INTRAVENOUS PRN
Status: DISCONTINUED | OUTPATIENT
Start: 2025-01-12 | End: 2025-01-14 | Stop reason: HOSPADM

## 2025-01-12 RX ORDER — SIROLIMUS 1 MG/1
1 TABLET, FILM COATED ORAL DAILY
Status: DISCONTINUED | OUTPATIENT
Start: 2025-01-12 | End: 2025-01-14 | Stop reason: HOSPADM

## 2025-01-12 RX ORDER — POLYETHYLENE GLYCOL 3350 17 G/17G
17 POWDER, FOR SOLUTION ORAL DAILY PRN
Status: DISCONTINUED | OUTPATIENT
Start: 2025-01-12 | End: 2025-01-14 | Stop reason: HOSPADM

## 2025-01-12 RX ORDER — ACETAMINOPHEN 325 MG/1
650 TABLET ORAL EVERY 6 HOURS PRN
Status: DISCONTINUED | OUTPATIENT
Start: 2025-01-12 | End: 2025-01-14 | Stop reason: HOSPADM

## 2025-01-12 RX ORDER — POTASSIUM CHLORIDE 1500 MG/1
40 TABLET, EXTENDED RELEASE ORAL PRN
Status: DISCONTINUED | OUTPATIENT
Start: 2025-01-12 | End: 2025-01-14 | Stop reason: HOSPADM

## 2025-01-12 RX ORDER — MAGNESIUM SULFATE IN WATER 40 MG/ML
2000 INJECTION, SOLUTION INTRAVENOUS PRN
Status: DISCONTINUED | OUTPATIENT
Start: 2025-01-12 | End: 2025-01-14 | Stop reason: HOSPADM

## 2025-01-12 RX ORDER — PANTOPRAZOLE SODIUM 40 MG/1
40 TABLET, DELAYED RELEASE ORAL
Status: DISCONTINUED | OUTPATIENT
Start: 2025-01-13 | End: 2025-01-14 | Stop reason: HOSPADM

## 2025-01-12 RX ORDER — METOPROLOL TARTRATE 50 MG
50 TABLET ORAL 2 TIMES DAILY
Status: DISCONTINUED | OUTPATIENT
Start: 2025-01-12 | End: 2025-01-14 | Stop reason: HOSPADM

## 2025-01-12 RX ORDER — ACETAMINOPHEN 650 MG/1
650 SUPPOSITORY RECTAL EVERY 6 HOURS PRN
Status: DISCONTINUED | OUTPATIENT
Start: 2025-01-12 | End: 2025-01-14 | Stop reason: HOSPADM

## 2025-01-12 RX ORDER — METOPROLOL TARTRATE 1 MG/ML
5 INJECTION, SOLUTION INTRAVENOUS ONCE
Status: COMPLETED | OUTPATIENT
Start: 2025-01-12 | End: 2025-01-12

## 2025-01-12 RX ORDER — OXYCODONE HYDROCHLORIDE 5 MG/1
5 TABLET ORAL EVERY 6 HOURS PRN
Status: DISCONTINUED | OUTPATIENT
Start: 2025-01-12 | End: 2025-01-13

## 2025-01-12 RX ORDER — ONDANSETRON 2 MG/ML
4 INJECTION INTRAMUSCULAR; INTRAVENOUS EVERY 6 HOURS PRN
Status: DISCONTINUED | OUTPATIENT
Start: 2025-01-12 | End: 2025-01-14 | Stop reason: HOSPADM

## 2025-01-12 RX ADMIN — APIXABAN 5 MG: 5 TABLET, FILM COATED ORAL at 13:30

## 2025-01-12 RX ADMIN — MAGNESIUM SULFATE HEPTAHYDRATE 2000 MG: 40 INJECTION, SOLUTION INTRAVENOUS at 12:32

## 2025-01-12 RX ADMIN — SODIUM CHLORIDE, PRESERVATIVE FREE 10 ML: 5 INJECTION INTRAVENOUS at 13:31

## 2025-01-12 RX ADMIN — SIROLIMUS 1 MG: 1 TABLET ORAL at 13:30

## 2025-01-12 RX ADMIN — OXYCODONE 5 MG: 5 TABLET ORAL at 15:18

## 2025-01-12 RX ADMIN — SODIUM CHLORIDE, PRESERVATIVE FREE 10 ML: 5 INJECTION INTRAVENOUS at 20:51

## 2025-01-12 RX ADMIN — SODIUM CHLORIDE 1000 ML: 9 INJECTION, SOLUTION INTRAVENOUS at 05:45

## 2025-01-12 RX ADMIN — APIXABAN 5 MG: 5 TABLET, FILM COATED ORAL at 20:48

## 2025-01-12 RX ADMIN — METOPROLOL TARTRATE 5 MG: 5 INJECTION INTRAVENOUS at 05:45

## 2025-01-12 RX ADMIN — PREDNISONE 10 MG: 10 TABLET ORAL at 13:30

## 2025-01-12 RX ADMIN — OXYCODONE 5 MG: 5 TABLET ORAL at 20:47

## 2025-01-12 RX ADMIN — METOPROLOL TARTRATE 50 MG: 50 TABLET, FILM COATED ORAL at 20:51

## 2025-01-12 RX ADMIN — METOPROLOL TARTRATE 50 MG: 50 TABLET, FILM COATED ORAL at 13:31

## 2025-01-12 ASSESSMENT — PAIN - FUNCTIONAL ASSESSMENT
PAIN_FUNCTIONAL_ASSESSMENT: 0-10

## 2025-01-12 ASSESSMENT — PAIN SCALES - GENERAL
PAINLEVEL_OUTOF10: 8
PAINLEVEL_OUTOF10: 2
PAINLEVEL_OUTOF10: 2
PAINLEVEL_OUTOF10: 3
PAINLEVEL_OUTOF10: 5
PAINLEVEL_OUTOF10: 8
PAINLEVEL_OUTOF10: 8
PAINLEVEL_OUTOF10: 6
PAINLEVEL_OUTOF10: 4
PAINLEVEL_OUTOF10: 1

## 2025-01-12 ASSESSMENT — PAIN DESCRIPTION - ORIENTATION: ORIENTATION: RIGHT

## 2025-01-12 ASSESSMENT — PAIN DESCRIPTION - LOCATION: LOCATION: SHOULDER

## 2025-01-12 ASSESSMENT — PAIN DESCRIPTION - FREQUENCY: FREQUENCY: CONTINUOUS

## 2025-01-12 ASSESSMENT — PAIN DESCRIPTION - PAIN TYPE: TYPE: CHRONIC PAIN

## 2025-01-12 ASSESSMENT — PAIN DESCRIPTION - ONSET: ONSET: ON-GOING

## 2025-01-12 ASSESSMENT — PAIN DESCRIPTION - DESCRIPTORS: DESCRIPTORS: ACHING

## 2025-01-12 NOTE — ED NOTES
Pt resting in cot. Dr. Jim at bedside completing and ultra sound of pt's heart. No needs at this time, vitals reassessed.

## 2025-01-12 NOTE — H&P
male.   Eyes:  Pupils equal, round, and reactive to light. Conjunctivae/corneas clear.  HENT: Head normal in appearance. External nares normal.  Oral mucosa moist without lesions.  Hearing grossly intact.   Neck: Supple, with full range of motion. Trachea midline.  No gross JVD appreciated.  Respiratory:  Normal respiratory effort. Clear to auscultation, bilaterally without rales or wheezes or rhonchi. PleurX drain on right side.  Cardiovascular: Normal rate, regular rhythm with normal S1/S2 without murmurs.  No lower extremity edema.   Abdomen: Soft, non-tender, non-distended with normal bowel sounds.  Musculoskeletal: No joint swelling or tenderness. Normal tone. No abnormal movements.   Skin: Warm and dry. No rashes or lesions.  Neurologic:  No focal sensory/motor deficits in the upper and lower extremities. Cranial nerves:  grossly non-focal 2-12.     Psychiatric: Alert and oriented, normal insight and thought content.   Capillary Refill: Brisk,< 3 seconds.  Peripheral Pulses: +2 palpable, equal bilaterally.       Medications Prior to Admission:   Prior to Admission Medications   Prescriptions Last Dose Informant Patient Reported? Taking?   Ascorbic Acid (VITAMIN C) 500 MG tablet   Yes No   Sig: Take 2 tablets by mouth daily   Cholecalciferol (VITAMIN D3) 1.25 MG (09470 UT) CAPS   Yes No   Sig: Take by mouth   Misc Natural Products (GLUCOSAMINE CHOND COMPLEX/MSM PO)   Yes No   Sig: Take  by mouth.   acyclovir (ZOVIRAX) 200 MG capsule   Yes No   Sig: Take by mouth as needed   apixaban (ELIQUIS) 5 MG TABS tablet   No No   Sig: Take 1 tablet by mouth 2 times daily   ferrous sulfate (IRON 325) 325 (65 Fe) MG tablet   No No   Sig: Take 1 tablet by mouth 2 times daily (with meals)   furosemide (LASIX) 20 MG tablet   No No   Sig: Take 1 tablet by mouth daily   metoprolol tartrate (LOPRESSOR) 25 MG tablet   No No   Sig: Take 1 tablet by mouth 2 times daily   oxyCODONE (ROXICODONE) 5 MG immediate release tablet   Yes No

## 2025-01-12 NOTE — ED PROVIDER NOTES
EMERGENCY DEPARTMENT ENCOUNTER     CHIEF COMPLAINT   Chief Complaint   Patient presents with    Palpitations    Shortness of Breath    diphoretic        HPI   Ousmnae Lockett is a 59 y.o. male with metastatic lung CA (possible mets to liver) on chemo who presents with palpitations and he describes the quality as tachycardia since the onset PTA. The patient has had associated weakness. Pt is on Eliquis.    REVIEW OF SYSTEMS   Cardiac: see HPI  Respiratory: No new cough, denies flu like illness, sputum, sob  GI: No vomiting or abdominal pain  General: No Fever or syncope  see history of present illness  All other review of systems are reviewed and are otherwise negative.    PAST MEDICAL & SURGICAL HISTORY   Past Medical History:   Diagnosis Date    Cancer (HCC)     CVA (cerebral vascular accident) (HCC)     with chemo - residual numbness in left hand    Hematoma     right leg s/p fall    Hx of blood clots     Arm    Hyperlipidemia     Hypertension     Irregular heart rate 11/30/2024    Non Hodgkin's lymphoma (HCC)     1995/96    NSCLC of right lung (HCC) 07/03/2024    Pneumonia 09/29/2024    Renal transplant recipient     [facility] transplant follows-[facility]    Sepsis due to pneumonia (HCC) 11/30/2024    Status post surgical removal of malignant neoplasm of skin 2020    Stroke (HCC) 1996     Past Surgical History:   Procedure Laterality Date    APPENDECTOMY      1972    BONE MARROW TRANSPLANT  1996    [facility] transplant once a year    CHOLECYSTECTOMY, LAPAROSCOPIC N/A 11/19/2021    CHOLECYSTECTOMY LAPAROSCOPIC performed by Abdi Carmona MD at [facility] OR    COLONOSCOPY      FACIAL SURGERY N/A 10/28/2020    WIDER EXCISION MELANOMA VERTEX OF SCALP performed by Tera Perez MD at [facility] OR    GALLBLADDER SURGERY  2020    IR GUIDED PERC PLEURAL DRAIN W CATH INSERT  10/2024    KIDNEY TRANSPLANT  06/06/2000    LOBECTOMY Right 07/03/2024    @ [facility]    OTHER SURGICAL HISTORY      CAPD catheter insertion

## 2025-01-12 NOTE — ED NOTES
Pt and family updated on care plan, verbal understanding given. Pt medicated per MAR. Vital signs assessed.

## 2025-01-12 NOTE — PROCEDURES
PROCEDURE NOTE  Date: 1/12/2025   Name: Ousmane LAGUNA Cathryn  YOB: 1965    Procedures  EKG completed, given to Sharon CORBIN

## 2025-01-12 NOTE — ED NOTES
ED to inpatient nurses report      Chief Complaint:  Chief Complaint   Patient presents with    Palpitations    Shortness of Breath    diphoretic     Present to ED from: Home    MOA:     LOC: alert and orientated to name, place, date  Mobility: Requires assistance * 1  Oxygen Baseline: RA    Current needs required: RA     Code Status:   Prior    What abnormal results were found and what did you give/do to treat them? Tachyarrhythmia; Ventricular tachycardia; Elevated troponin; Hyponatremia  Any procedures or intervention occur? None; See MAR    Mental Status:  Level of Consciousness: Alert (0)    Psych Assessment:        Vitals:  Patient Vitals for the past 24 hrs:   BP Temp Temp src Pulse Resp SpO2 Height Weight   01/12/25 0639 104/61 -- -- 91 18 99 % -- --   01/12/25 0613 103/62 -- -- 88 28 99 % -- --   01/12/25 0559 108/60 -- -- 85 27 96 % -- --   01/12/25 0552 (!) 113/52 -- -- (!) 104 23 97 % -- --   01/12/25 0542 118/65 -- -- (!) 133 22 97 % -- --   01/12/25 0534 (!) 129/55 -- -- (!) 123 22 96 % -- --   01/12/25 0524 (!) 110/54 98.3 °F (36.8 °C) Oral (!) 102 23 96 % 1.778 m (5' 10\") 85.7 kg (189 lb)        LDAs:   Peripheral IV 01/10/25 Distal;Left;Anterior Cephalic (Active)   Site Assessment Clean, dry & intact 01/12/25 0638   Line Status Infusing 01/12/25 0638   Line Care Connections checked and tightened 01/12/25 0614   Phlebitis Assessment No symptoms 01/12/25 0638   Infiltration Assessment 0 01/12/25 0638   Dressing Status Clean, dry & intact 01/12/25 0638       Ambulatory Status:  Presents to emergency department  because of falls (Syncope, seizure, or loss of consciousness): No, Age > 70: No, Altered Mental Status, Intoxication with alcohol or substance confusion (Disorientation, impaired judgment, poor safety awaremess, or inability to follow instructions): No, Impaired Mobility: Ambulates or transfers with assistive devices or assistance; Unable to ambulate or transer.: No    Diagnosis:  DISPOSITION

## 2025-01-12 NOTE — ED NOTES
Pt resting on cot with lights dimmed. Urinal provided to pt. No needs at this time, vital signs assessed.

## 2025-01-13 PROBLEM — Z86.73 HISTORY OF CVA (CEREBROVASCULAR ACCIDENT): Status: ACTIVE | Noted: 2025-01-13

## 2025-01-13 PROBLEM — I47.20 V-TACH (HCC): Status: RESOLVED | Noted: 2025-01-12 | Resolved: 2025-01-13

## 2025-01-13 PROBLEM — C34.91 NSCLC OF RIGHT LUNG (HCC): Status: ACTIVE | Noted: 2024-07-03

## 2025-01-13 LAB
ABO: NORMAL
ANION GAP SERPL CALC-SCNC: 7 MEQ/L (ref 8–16)
ANTIBODY SCREEN: NORMAL
BUN SERPL-MCNC: 14 MG/DL (ref 7–22)
CALCIUM SERPL-MCNC: 8.1 MG/DL (ref 8.5–10.5)
CHLORIDE SERPL-SCNC: 99 MEQ/L (ref 98–111)
CO2 SERPL-SCNC: 21 MEQ/L (ref 23–33)
CREAT SERPL-MCNC: 0.4 MG/DL (ref 0.4–1.2)
DEPRECATED RDW RBC AUTO: 67.3 FL (ref 35–45)
ERYTHROCYTE [DISTWIDTH] IN BLOOD BY AUTOMATED COUNT: 22.7 % (ref 11.5–14.5)
GFR SERPL CREATININE-BSD FRML MDRD: > 90 ML/MIN/1.73M2
GLUCOSE SERPL-MCNC: 107 MG/DL (ref 70–108)
HCT VFR BLD AUTO: 24.3 % (ref 42–52)
HCT VFR BLD AUTO: 25.4 % (ref 42–52)
HCT VFR BLD AUTO: 30.5 % (ref 42–52)
HGB BLD-MCNC: 6.8 GM/DL (ref 14–18)
HGB BLD-MCNC: 7 GM/DL (ref 14–18)
HGB BLD-MCNC: 8.7 GM/DL (ref 14–18)
MAGNESIUM SERPL-MCNC: 2.4 MG/DL (ref 1.6–2.4)
MCH RBC QN AUTO: 22.7 PG (ref 26–33)
MCHC RBC AUTO-ENTMCNC: 27.6 GM/DL (ref 32.2–35.5)
MCV RBC AUTO: 82.5 FL (ref 80–94)
PATHOLOGIST REVIEW: ABNORMAL
PLATELET # BLD AUTO: 245 THOU/MM3 (ref 130–400)
PMV BLD AUTO: 9.6 FL (ref 9.4–12.4)
POTASSIUM SERPL-SCNC: 4.9 MEQ/L (ref 3.5–5.2)
RBC # BLD AUTO: 3.08 MILL/MM3 (ref 4.7–6.1)
RH FACTOR: NORMAL
SCAN OF BLOOD SMEAR: NORMAL
SIROLIMUS BLD-MCNC: 4.3 NG/ML
SODIUM SERPL-SCNC: 127 MEQ/L (ref 135–145)
SODIUM SERPL-SCNC: 133 MEQ/L (ref 135–145)
WBC # BLD AUTO: 5.8 THOU/MM3 (ref 4.8–10.8)

## 2025-01-13 PROCEDURE — 6360000002 HC RX W HCPCS

## 2025-01-13 PROCEDURE — 6370000000 HC RX 637 (ALT 250 FOR IP): Performed by: INTERNAL MEDICINE

## 2025-01-13 PROCEDURE — 99232 SBSQ HOSP IP/OBS MODERATE 35: CPT | Performed by: INTERNAL MEDICINE

## 2025-01-13 PROCEDURE — 99222 1ST HOSP IP/OBS MODERATE 55: CPT | Performed by: INTERNAL MEDICINE

## 2025-01-13 PROCEDURE — 1200000003 HC TELEMETRY R&B

## 2025-01-13 PROCEDURE — 36430 TRANSFUSION BLD/BLD COMPNT: CPT

## 2025-01-13 PROCEDURE — 36415 COLL VENOUS BLD VENIPUNCTURE: CPT

## 2025-01-13 PROCEDURE — 30233N1 TRANSFUSION OF NONAUTOLOGOUS RED BLOOD CELLS INTO PERIPHERAL VEIN, PERCUTANEOUS APPROACH: ICD-10-PCS | Performed by: INTERNAL MEDICINE

## 2025-01-13 PROCEDURE — 83735 ASSAY OF MAGNESIUM: CPT

## 2025-01-13 PROCEDURE — 84295 ASSAY OF SERUM SODIUM: CPT

## 2025-01-13 PROCEDURE — 85018 HEMOGLOBIN: CPT

## 2025-01-13 PROCEDURE — 86850 RBC ANTIBODY SCREEN: CPT

## 2025-01-13 PROCEDURE — 85014 HEMATOCRIT: CPT

## 2025-01-13 PROCEDURE — 80048 BASIC METABOLIC PNL TOTAL CA: CPT

## 2025-01-13 PROCEDURE — 86900 BLOOD TYPING SEROLOGIC ABO: CPT

## 2025-01-13 PROCEDURE — 86901 BLOOD TYPING SEROLOGIC RH(D): CPT

## 2025-01-13 PROCEDURE — 85027 COMPLETE CBC AUTOMATED: CPT

## 2025-01-13 PROCEDURE — P9040 RBC LEUKOREDUCED IRRADIATED: HCPCS

## 2025-01-13 PROCEDURE — 2500000003 HC RX 250 WO HCPCS

## 2025-01-13 PROCEDURE — 86923 COMPATIBILITY TEST ELECTRIC: CPT

## 2025-01-13 PROCEDURE — 6370000000 HC RX 637 (ALT 250 FOR IP)

## 2025-01-13 RX ORDER — SODIUM CHLORIDE 9 MG/ML
INJECTION, SOLUTION INTRAVENOUS PRN
Status: DISCONTINUED | OUTPATIENT
Start: 2025-01-13 | End: 2025-01-14 | Stop reason: HOSPADM

## 2025-01-13 RX ORDER — OXYCODONE HYDROCHLORIDE 5 MG/1
5 TABLET ORAL EVERY 4 HOURS PRN
Status: DISCONTINUED | OUTPATIENT
Start: 2025-01-13 | End: 2025-01-14 | Stop reason: HOSPADM

## 2025-01-13 RX ADMIN — APIXABAN 5 MG: 5 TABLET, FILM COATED ORAL at 08:16

## 2025-01-13 RX ADMIN — PANTOPRAZOLE SODIUM 40 MG: 40 TABLET, DELAYED RELEASE ORAL at 06:50

## 2025-01-13 RX ADMIN — SIROLIMUS 1 MG: 1 TABLET ORAL at 08:16

## 2025-01-13 RX ADMIN — OXYCODONE 5 MG: 5 TABLET ORAL at 15:10

## 2025-01-13 RX ADMIN — OXYCODONE 5 MG: 5 TABLET ORAL at 04:28

## 2025-01-13 RX ADMIN — SODIUM CHLORIDE, PRESERVATIVE FREE 10 ML: 5 INJECTION INTRAVENOUS at 19:31

## 2025-01-13 RX ADMIN — METOPROLOL TARTRATE 50 MG: 50 TABLET, FILM COATED ORAL at 08:16

## 2025-01-13 RX ADMIN — OXYCODONE 5 MG: 5 TABLET ORAL at 10:59

## 2025-01-13 RX ADMIN — METOPROLOL TARTRATE 50 MG: 50 TABLET, FILM COATED ORAL at 19:31

## 2025-01-13 RX ADMIN — PREDNISONE 10 MG: 10 TABLET ORAL at 08:16

## 2025-01-13 RX ADMIN — OXYCODONE 5 MG: 5 TABLET ORAL at 19:33

## 2025-01-13 RX ADMIN — SODIUM CHLORIDE, PRESERVATIVE FREE 10 ML: 5 INJECTION INTRAVENOUS at 08:17

## 2025-01-13 ASSESSMENT — ENCOUNTER SYMPTOMS
DIARRHEA: 0
NAUSEA: 0
SHORTNESS OF BREATH: 0
BACK PAIN: 0
COUGH: 0
VOMITING: 0
ABDOMINAL PAIN: 0
EYES NEGATIVE: 1

## 2025-01-13 ASSESSMENT — PAIN SCALES - GENERAL
PAINLEVEL_OUTOF10: 0
PAINLEVEL_OUTOF10: 5
PAINLEVEL_OUTOF10: 0
PAINLEVEL_OUTOF10: 4
PAINLEVEL_OUTOF10: 4
PAINLEVEL_OUTOF10: 2

## 2025-01-13 ASSESSMENT — PAIN DESCRIPTION - PAIN TYPE
TYPE: CHRONIC PAIN

## 2025-01-13 ASSESSMENT — PAIN DESCRIPTION - DESCRIPTORS
DESCRIPTORS: ACHING

## 2025-01-13 ASSESSMENT — PAIN DESCRIPTION - LOCATION
LOCATION: OTHER (COMMENT)
LOCATION: RIB CAGE
LOCATION: SHOULDER

## 2025-01-13 ASSESSMENT — PAIN - FUNCTIONAL ASSESSMENT
PAIN_FUNCTIONAL_ASSESSMENT: ACTIVITIES ARE NOT PREVENTED

## 2025-01-13 ASSESSMENT — PAIN SCALES - WONG BAKER
WONGBAKER_NUMERICALRESPONSE: NO HURT
WONGBAKER_NUMERICALRESPONSE: NO HURT

## 2025-01-13 ASSESSMENT — PAIN DESCRIPTION - FREQUENCY
FREQUENCY: CONTINUOUS

## 2025-01-13 ASSESSMENT — PAIN DESCRIPTION - ORIENTATION
ORIENTATION: RIGHT

## 2025-01-13 ASSESSMENT — PAIN DESCRIPTION - ONSET: ONSET: ON-GOING

## 2025-01-13 NOTE — CARE COORDINATION
01/13/25 1036   Readmission Assessment   Number of Days since last admission? 1-7 days   Previous Disposition Home with Family   Who is being Interviewed Patient   What was the patient's/caregiver's perception as to why they think they needed to return back to the hospital? Other (Comment)  (Heart racing)   Did you visit your Primary Care Physician after you left the hospital, before you returned this time? No   Why weren't you able to visit your PCP? Other (Comment)  (Returned to hospital prior to any provider visit.)   Did you see a specialist, such as Cardiac, Pulmonary, Orthopedic Physician, etc. after you left the hospital? No   Who advised the patient to return to the hospital? Self-referral   Does the patient report anything that got in the way of taking their medications? No   In our efforts to provide the best possible care to you and others like you, can you think of anything that we could have done to help you after you left the hospital the first time, so that you might not have needed to return so soon? Other (Comment)  (No.)

## 2025-01-13 NOTE — PALLIATIVE CARE
Initial Evaluation        Patient:   Ousmaen Lockett  YOB: 1965  Age:  59 y.o.  Room:  [facility]35/035-A  MRN:  [id number]   Acct: [id number]    Date of Admission:  1/12/2025  5:17 AM  Date of Service:  1/13/2025  Completed By:  Garry Christianson RN        Reason for Palliative Care Evaluation:-   Goals of Care     Current Concerns   pain     Palliative Performance Scale   60%  Ambulation reduced; Significant disease; Can't do hobbies/housework; intake normal or reduced; occasional assist; LOC full/confusion     History    Patient admitted 1/12 from home with A-fib, elevated troponin, and asymptomatic hyponatremia. Patient is currently under treatment for RLL metastatic NSCLC and currently has a pleurx in place to drain chronic pleural effusion. Patient has history of renal transplant over 20 years ago.      Goals of Care Discussions and Plan         Family/Patient Discussion:- Met with patient at bedside. Introduced palliative care and role on medical team, patient consented to conversation. Discussed advanced care planning. Patient interested in filling out medical power of  documents at some point, but not today. Blank medical power of  and living will documents left at bedside for review. Discussed palliative care in helping people deal with serious illness to manage symptoms and quality of life. Patient does have pain related to cancer and is being treated by his oncologist at [facility]. Patient did request to have Oxy IR increased frequency to Q4 hours here as that is what is prescribed at home. Primary RN updated and is discussing with attending. OP palliative care contact information provided to patient should he need help with symptom management. Denies other needs at this time.    Plan/Follow-Up:-   Palliative care will continue to follow, please call for additional needs.    Code Status:Full Code No additional code details    ACP documents on file:  -None    Healthcare Power of

## 2025-01-13 NOTE — CARE COORDINATION
Case Management Assessment Initial Evaluation    Date/Time of Evaluation: 2025 9:31 AM  Assessment Completed by: Mansi Mae RN    If patient is discharged prior to next notation, then this note serves as note for discharge by case management.    Patient Name: Ousmane Lockett                   YOB: 1965  Diagnosis: Hyponatremia [E87.1]  Ventricular tachycardia (HCC) [I47.20]  V-tach (HCC) [I47.20]  Tachyarrhythmia [R00.0]  Elevated troponin [R79.89]                   Date / Time: 2025  5:17 AM  Location: [address] [place] [address]     Patient Admission Status: Inpatient   Readmission Risk Low 0-14, Mod 15-19), High > 20: Readmission Risk Score: 27.4    Current PCP: Daniel Barbosa MD  Health Care Decision Makers:   Primary Decision Maker: Monica Lockett - Spouse - [phone]    Additional Case Management Notes: Redmitted through ER with c/o palpitations, SOB. Hx metastatic lung CA. On chemo. On Eliquis. EKG reveals /min. Cardiology consulted. Hgb 8.1 on admit, this am 6.8. PRC's ordered. Hx renal transplant. Nephro consulted. Palliative Care consulted.     Procedures: No.    Imagin25   XR CHEST PORTABLE   Final Result   Persistent small-moderate right effusion with basilar opacity.       Patient Goals/Plan/Treatment Preferences: Met with Ousmane. Resides with spouse. No current home services or DME.  He states that since last admission, family has moved his bed to the main floor of the house. Follow for discharg needs.        25 1050   Service Assessment   Patient Orientation Alert and Oriented   Cognition Alert   History Provided By Patient   Primary Caregiver Self   Support Systems Spouse/Significant Other;Family Members;Children   Patient's Healthcare Decision Maker is: Legal Next of Kin   PCP Verified by CM Yes   Last Visit to PCP Within last 6 months   Prior Functional Level Independent in ADLs/IADLs   Current Functional Level Independent in ADLs/IADLs   Can patient

## 2025-01-13 NOTE — CONSENT
Informed Consent for Blood Component Transfusion Note    I have discussed with the patient the rationale for blood component transfusion; its benefits in treating or preventing fatigue, organ damage, or death; and its risk which includes mild transfusion reactions, rare risk of blood borne infection, or more serious but rare reactions. I have discussed the alternatives to transfusion, including the risk and consequences of not receiving transfusion. The patient had an opportunity to ask questions and had agreed to proceed with transfusion of blood components.    Electronically signed by Dontae Mahan III, DO on 1/13/25 at 4:21 AM EST

## 2025-01-13 NOTE — PLAN OF CARE
Problem: Chronic Conditions and Co-morbidities  Goal: Patient's chronic conditions and co-morbidity symptoms are monitored and maintained or improved  Outcome: Progressing  Flowsheets (Taken 1/12/2025 0808 by Darlyn Adame, RN)  Care Plan - Patient's Chronic Conditions and Co-Morbidity Symptoms are Monitored and Maintained or Improved: Monitor and assess patient's chronic conditions and comorbid symptoms for stability, deterioration, or improvement     Problem: Pain  Goal: Verbalizes/displays adequate comfort level or baseline comfort level  Outcome: Progressing     Problem: Safety - Adult  Goal: Free from fall injury  Outcome: Progressing     Problem: ABCDS Injury Assessment  Goal: Absence of physical injury  Outcome: Progressing     Problem: Discharge Planning  Goal: Discharge to home or other facility with appropriate resources  Recent Flowsheet Documentation  Taken 1/12/2025 0808 by Darlyn Adame, RN  Discharge to home or other facility with appropriate resources: Identify barriers to discharge with patient and caregiver

## 2025-01-13 NOTE — CONSULTS
[facility]  STRZ MED SURG 8AB  [address] [facility] [address]  Dept: [phone]  Loc: [phone]     Name: Ousmane Lockett  Account number: [id number]  Medical Record Unit Number: [id number]  : 1965  Admit Date 2025  Consult Note Date 2025  Reason for Consult:  VT  Requesting Physician: Lior Maria MD        CHIEF COMPLAINT:  Palpitations  Heart racing  PAF    HISTORY OF PRESENT ILLNESS:      The patient is an unfortunate 59 y.o. white male readmitted for heart racing.  Patient was hospitalized last month with paroxysmal atrial fibrillation and just went home yesterday for the same diagnosis.  He has multiple medical problems.  The patient does not have a history of CAD, MI, CHF or angina.  He has metastatic non-small cell lung cancer diagnosed in 2024.  He also has had a kidney transplant  and is on immunosuppressive therapy for that also.  Patient unfortunately has bone metastases and had a right shoulder surgery.  He did not do well with that and has a lot of problems with his right arm.     The patient denies any chest pain, pressure, squeezing, tightness or discomfort with exertion.  He denies any shortness of breath at rest.  He has some mild dyspnea on exertion at times.  He has palpitations and heart racing off and on.  He has a known paroxysmal atrial fibrillation and is being treated for that.  It was worse last night so he came back to ER.  ER said he had Vtach, but review of strips and EKGs show Afib with RVR and some aberration.  No recent dizziness or lightheadedness.  No syncope.     The patient has a history of hypertension.  No diabetes.  He has a history of hyperlipidemia.  He has never been a smoker.  There is a positive family history of CAD.  He has a history of a right arm clot in the past.     Past Medical History:  Past Medical History:   Diagnosis Date    Cancer (HCC)     CVA (cerebral vascular accident) (HCC) 
Acid (VITAMIN C) 500 MG tablet Take 2 tablets by mouth daily   Yes Brandon Bradley MD   oxyCODONE (ROXICODONE) 5 MG immediate release tablet Take 1 tablet by mouth every 6 hours as needed. 5/3/24   Brandon Bradley MD   acyclovir (ZOVIRAX) 200 MG capsule Take by mouth as needed    Brandon Bradley MD     Allergies: Patient has no known allergies.  IP meds : Scheduled Meds:   sodium chloride flush  5-40 mL IntraVENous 2 times per day    [Held by provider] apixaban  5 mg Oral BID    [Held by provider] ferrous sulfate  325 mg Oral BID WC    [Held by provider] furosemide  20 mg Oral Daily    pantoprazole  40 mg Oral QAM AC    predniSONE  10 mg Oral Daily    sirolimus  1 mg Oral Daily    metoprolol tartrate  50 mg Oral BID     Continuous Infusions:   sodium chloride      sodium chloride         Review of Systems  Review of Systems   Constitutional:  Negative for activity change, appetite change and fatigue.   HENT: Negative.     Eyes: Negative.    Respiratory:  Negative for cough and shortness of breath.    Cardiovascular:  Positive for palpitations. Negative for chest pain and leg swelling.   Gastrointestinal:  Negative for abdominal pain, diarrhea, nausea and vomiting.   Genitourinary:  Negative for difficulty urinating, dysuria, flank pain and frequency.   Musculoskeletal:  Negative for back pain and neck pain.   Skin:  Negative for rash and wound.   Neurological:  Negative for dizziness, light-headedness and headaches.   Psychiatric/Behavioral:  Negative for agitation and confusion.     Physical Exam  Physical Exam  Constitutional:       General: He is not in acute distress.     Appearance: Normal appearance.   HENT:      Head: Normocephalic and atraumatic.      Right Ear: External ear normal.      Left Ear: External ear normal.      Nose: Nose normal.      Mouth/Throat:      Mouth: Mucous membranes are moist.   Eyes:      General: No scleral icterus.        Right eye: No discharge.         Left eye:

## 2025-01-14 VITALS
RESPIRATION RATE: 16 BRPM | HEART RATE: 79 BPM | WEIGHT: 189 LBS | SYSTOLIC BLOOD PRESSURE: 131 MMHG | TEMPERATURE: 98.3 F | DIASTOLIC BLOOD PRESSURE: 60 MMHG | HEIGHT: 70 IN | OXYGEN SATURATION: 100 % | BODY MASS INDEX: 27.06 KG/M2

## 2025-01-14 LAB
ANION GAP SERPL CALC-SCNC: 11 MEQ/L (ref 8–16)
BASOPHILS ABSOLUTE: 0 THOU/MM3 (ref 0–0.1)
BASOPHILS NFR BLD AUTO: 0.7 %
BUN SERPL-MCNC: 23 MG/DL (ref 7–22)
CALCIUM SERPL-MCNC: 9.1 MG/DL (ref 8.5–10.5)
CHLORIDE SERPL-SCNC: 95 MEQ/L (ref 98–111)
CO2 SERPL-SCNC: 25 MEQ/L (ref 23–33)
CREAT SERPL-MCNC: 0.6 MG/DL (ref 0.4–1.2)
DEPRECATED RDW RBC AUTO: 49 FL (ref 35–45)
EOSINOPHIL NFR BLD AUTO: 1.5 %
EOSINOPHILS ABSOLUTE: 0.1 THOU/MM3 (ref 0–0.4)
ERYTHROCYTE [DISTWIDTH] IN BLOOD BY AUTOMATED COUNT: 15.9 % (ref 11.5–14.5)
GFR SERPL CREATININE-BSD FRML MDRD: > 90 ML/MIN/1.73M2
GLUCOSE SERPL-MCNC: 93 MG/DL (ref 70–108)
HCT VFR BLD AUTO: 32.8 % (ref 42–52)
HGB BLD-MCNC: 10.7 GM/DL (ref 14–18)
IMM GRANULOCYTES # BLD AUTO: 0.07 THOU/MM3 (ref 0–0.07)
IMM GRANULOCYTES NFR BLD AUTO: 1.3 %
LYMPHOCYTES ABSOLUTE: 0.9 THOU/MM3 (ref 1–4.8)
LYMPHOCYTES NFR BLD AUTO: 16.2 %
MCH RBC QN AUTO: 27.8 PG (ref 26–33)
MCHC RBC AUTO-ENTMCNC: 32.6 GM/DL (ref 32.2–35.5)
MCV RBC AUTO: 85.2 FL (ref 80–94)
MONOCYTES ABSOLUTE: 0.7 THOU/MM3 (ref 0.4–1.3)
MONOCYTES NFR BLD AUTO: 12 %
NEUTROPHILS ABSOLUTE: 3.8 THOU/MM3 (ref 1.8–7.7)
NEUTROPHILS NFR BLD AUTO: 68.3 %
NRBC BLD AUTO-RTO: 0 /100 WBC
PLATELET # BLD AUTO: 132 THOU/MM3 (ref 130–400)
PMV BLD AUTO: 9.3 FL (ref 9.4–12.4)
POTASSIUM SERPL-SCNC: 3.7 MEQ/L (ref 3.5–5.2)
RBC # BLD AUTO: 3.85 MILL/MM3 (ref 4.7–6.1)
SODIUM SERPL-SCNC: 131 MEQ/L (ref 135–145)
WBC # BLD AUTO: 5.5 THOU/MM3 (ref 4.8–10.8)

## 2025-01-14 PROCEDURE — 99232 SBSQ HOSP IP/OBS MODERATE 35: CPT | Performed by: INTERNAL MEDICINE

## 2025-01-14 PROCEDURE — 80048 BASIC METABOLIC PNL TOTAL CA: CPT

## 2025-01-14 PROCEDURE — 6370000000 HC RX 637 (ALT 250 FOR IP)

## 2025-01-14 PROCEDURE — 36415 COLL VENOUS BLD VENIPUNCTURE: CPT

## 2025-01-14 PROCEDURE — 99239 HOSP IP/OBS DSCHRG MGMT >30: CPT | Performed by: INTERNAL MEDICINE

## 2025-01-14 PROCEDURE — 85025 COMPLETE CBC W/AUTO DIFF WBC: CPT

## 2025-01-14 PROCEDURE — 6360000002 HC RX W HCPCS

## 2025-01-14 PROCEDURE — 2500000003 HC RX 250 WO HCPCS

## 2025-01-14 PROCEDURE — 6370000000 HC RX 637 (ALT 250 FOR IP): Performed by: INTERNAL MEDICINE

## 2025-01-14 RX ORDER — OXYCODONE HYDROCHLORIDE 5 MG/1
5 TABLET ORAL EVERY 4 HOURS PRN
Qty: 1 TABLET | Refills: 0
Start: 2025-01-14 | End: 2025-01-17

## 2025-01-14 RX ORDER — METOPROLOL TARTRATE 50 MG
50 TABLET ORAL 2 TIMES DAILY
Qty: 60 TABLET | Refills: 0 | Status: SHIPPED | OUTPATIENT
Start: 2025-01-14

## 2025-01-14 RX ADMIN — SODIUM CHLORIDE, PRESERVATIVE FREE 10 ML: 5 INJECTION INTRAVENOUS at 07:56

## 2025-01-14 RX ADMIN — OXYCODONE 5 MG: 5 TABLET ORAL at 07:50

## 2025-01-14 RX ADMIN — METOPROLOL TARTRATE 50 MG: 50 TABLET, FILM COATED ORAL at 07:55

## 2025-01-14 RX ADMIN — PANTOPRAZOLE SODIUM 40 MG: 40 TABLET, DELAYED RELEASE ORAL at 05:26

## 2025-01-14 RX ADMIN — OXYCODONE 5 MG: 5 TABLET ORAL at 03:15

## 2025-01-14 RX ADMIN — PREDNISONE 10 MG: 10 TABLET ORAL at 07:55

## 2025-01-14 RX ADMIN — SIROLIMUS 1 MG: 1 TABLET ORAL at 07:56

## 2025-01-14 RX ADMIN — OXYCODONE 5 MG: 5 TABLET ORAL at 11:47

## 2025-01-14 ASSESSMENT — PAIN SCALES - GENERAL
PAINLEVEL_OUTOF10: 0
PAINLEVEL_OUTOF10: 0
PAINLEVEL_OUTOF10: 8
PAINLEVEL_OUTOF10: 4
PAINLEVEL_OUTOF10: 6

## 2025-01-14 ASSESSMENT — PAIN DESCRIPTION - ONSET: ONSET: ON-GOING

## 2025-01-14 ASSESSMENT — PAIN DESCRIPTION - LOCATION
LOCATION: ABDOMEN;FLANK
LOCATION: SHOULDER
LOCATION: RIB CAGE

## 2025-01-14 ASSESSMENT — PAIN DESCRIPTION - DESCRIPTORS
DESCRIPTORS: ACHING
DESCRIPTORS: DISCOMFORT;SHARP;TENDER

## 2025-01-14 ASSESSMENT — PAIN - FUNCTIONAL ASSESSMENT: PAIN_FUNCTIONAL_ASSESSMENT: ACTIVITIES ARE NOT PREVENTED

## 2025-01-14 ASSESSMENT — PAIN DESCRIPTION - ORIENTATION
ORIENTATION: RIGHT

## 2025-01-14 ASSESSMENT — PAIN DESCRIPTION - FREQUENCY: FREQUENCY: CONTINUOUS

## 2025-01-14 ASSESSMENT — PAIN DESCRIPTION - PAIN TYPE: TYPE: CHRONIC PAIN

## 2025-01-14 NOTE — PLAN OF CARE
Problem: Chronic Conditions and Co-morbidities  Goal: Patient's chronic conditions and co-morbidity symptoms are monitored and maintained or improved  Outcome: Progressing  Flowsheets  Taken 1/13/2025 1915 by Huong Warren RN  Care Plan - Patient's Chronic Conditions and Co-Morbidity Symptoms are Monitored and Maintained or Improved: Monitor and assess patient's chronic conditions and comorbid symptoms for stability, deterioration, or improvement     Problem: Discharge Planning  Goal: Discharge to home or other facility with appropriate resources  Outcome: Progressing  Flowsheets  Taken 1/13/2025 1915 by Huong Warren RN  Discharge to home or other facility with appropriate resources: Identify barriers to discharge with patient and caregiver    Problem: Pain  Goal: Verbalizes/displays adequate comfort level or baseline comfort level  Outcome: Progressing  Flowsheets (Taken 1/13/2025 1915)  Verbalizes/displays adequate comfort level or baseline comfort level: Encourage patient to monitor pain and request assistance     Problem: Safety - Adult  Goal: Free from fall injury  Outcome: Progressing     Problem: ABCDS Injury Assessment  Goal: Absence of physical injury  Outcome: Progressing

## 2025-01-14 NOTE — PROGRESS NOTES
At start of shift, Patient stated that his PleurX Catheter needed drained. Patient states he drains on M, W, F schedule, but due to coming into the ER on 1/13 he did not drain at home. This RN contacts Tera Wallace for an order to drain and order was placed under a nursing communication. This RN calls Katherine the resource RN to help with this procedure around 2000. Katherine states her night is going to be very busy, but that she would try to send up supplies and help. At 0300 this RN contacts house supervisor Laura to see if resource RN was still tied up and Laura RN stated yes, that it had been a very busy night. At 0515 this RN talks with  Darlyn to see if she could help with this procedure, but states no that she was not familiar with this procedure. This RN has placed the supplies sent up by Katherine CORBIN in the room and will update day shift on situation. Patient vitals are stable and resting in bed at this time. Call light in reach.   
Discharge education and instructions provided. Patient verbalized understanding at this time. No questions asked. IV access removed. All personal belongings, AVS present with patient. Transport to main lobby via wheelchair.     
Patient received sacramental anointing by a . If you are in need of  support, please call [phone]. If you are in need of a  after 6:30pm, please call the house supervisor for the on-call .      Katie Lovelace  [facility] Health Coordinator  [phone]   
01/12/25  0735 01/12/25  1104 01/13/25  0221 01/13/25  1614 01/14/25  0545   *  --    < > 127* 133* 131*   K 4.2  --   --  4.9  --  3.7   CL 95*  --   --  99  --  95*   CO2 25  --   --  21*  --  25   BUN 15  --   --  14  --  23*   CREATININE 0.7  --   --  0.4  --  0.6   GLUCOSE 143*  --   --  107  --  93   CALCIUM 8.6  --   --  8.1*  --  9.1   MG  --  1.7  --  2.4  --   --     < > = values in this interval not displayed.     Hepatic:   Recent Labs     01/12/25  0735   AST 20   ALT 40   BILITOT 0.4   ALKPHOS 132*       Assessment and Plan:  Renal -renal function appears to be stable in fact somewhat better than baseline down to 0.4  Follow renal function closely  Continue immunosuppression  Electrolytes -hyponatremia etiology not certain sodium somewhat improved.  Advised 1500 mL fluid restriction at home should improve in the next few days  Essential hypertension  Atrial fibrillation which has improved resolved  History of colon cancer  History of renal transplant continue immunosuppression  Meds reviewed and discussed with patient    Upon discharge please have the patient follow-up with me in office in 2 to 3 weeks and he needs a BMP next week    Dax Oates MD  [facility]    This report has been created using voice recognition software. It may contain minor errors which are inherent in voice recognition technology  
bilateral ribs, the sternum, the right scapula, the medial left clavicle, throughout the pelvis and the bilateral proximal femurs. A representative 2.3 cm lesion at the right ilium abutting the sacroiliac joint has a maximum SUV of 9.2 (image 291).     1. Small, mildly FDG avid right lung nodule highly suspicious for malignancy. 2. Multiple FDG avid hypoattenuating lesions throughout the liver, likely metastatic disease. 3. Diffuse FDG avid osseous metastatic disease. **This report has been created using voice recognition software. It may contain minor errors which are inherent in voice recognition technology.** Electronically signed by Dr. Piotr Barker      This note was dictated using M*Modal Fluency Direct so please excuse any grammatical or syntax errors as no guarantees can be provided that every mistake has been identified and corrected by editing.      Electronically signed by Devonte Castro MD on 1/13/2025 at 1:50 PM     
of 10.9 (image 192). Physiologic activity is present in the remaining liver, spleen, urinary collecting system and gastrointestinal tract. The bilateral kidneys are atrophic and there is a transplanted right pelvic kidney. There is no FDG avid mesenteric, retroperitoneal, pelvic or inguinal lymphadenopathy. Osseous: Degenerative changes are present in the cervical, thoracic and lumbar spine. There is a right shoulder prosthesis. There are FDG avid lytic lesions throughout the visualized skeleton. In particular, FDG avid lesions are noted in the cervical, thoracic and lumbar spine, multiple bilateral ribs, the sternum, the right scapula, the medial left clavicle, throughout the pelvis and the bilateral proximal femurs. A representative 2.3 cm lesion at the right ilium abutting the sacroiliac joint has a maximum SUV of 9.2 (image 291).     1. Small, mildly FDG avid right lung nodule highly suspicious for malignancy. 2. Multiple FDG avid hypoattenuating lesions throughout the liver, likely metastatic disease. 3. Diffuse FDG avid osseous metastatic disease. **This report has been created using voice recognition software. It may contain minor errors which are inherent in voice recognition technology.** Electronically signed by Dr. Piotr Barker      This note was dictated using M*Modal Fluency Direct so please excuse any grammatical or syntax errors as no guarantees can be provided that every mistake has been identified and corrected by editing.      Electronically signed by Devonte Castro MD on 1/14/2025 at 8:18 AM

## 2025-01-14 NOTE — DISCHARGE INSTRUCTIONS
Continue to withhold Eliquis pending liver biopsy by IR on 1/16/2025   Follow-up with your primary care provider (PCP) in 1 to 2 weeks.

## 2025-01-14 NOTE — PLAN OF CARE
Problem: Chronic Conditions and Co-morbidities  Goal: Patient's chronic conditions and co-morbidity symptoms are monitored and maintained or improved  Outcome: Progressing     Problem: Discharge Planning  Goal: Discharge to home or other facility with appropriate resources  Outcome: Progressing     Problem: Pain  Goal: Verbalizes/displays adequate comfort level or baseline comfort level  Outcome: Progressing     Problem: Safety - Adult  Goal: Free from fall injury  Outcome: Progressing     Problem: ABCDS Injury Assessment  Goal: Absence of physical injury  Outcome: Progressing     Care plan reviewed with patient , patient verbalized understanding of plan of care and contributed to goal setting.

## 2025-01-14 NOTE — DISCHARGE SUMMARY
[facility]--HOSPITALIST GROUP    Hospitalist DISCHARGE SUMMARY dictated by Devonte Castro MD on 2025      DEMOGRAPHICS     Date of Service: 2025  12:38 PM   Patient ID:Ousmane Lockett is59 y.o.   : 1965 MRN:[id number]  Code Status:  Full Code  Admit date: 2025  Discharge date: 2025     Primary Care Physician - Daniel Barbosa MD   Patient Care Team:  Daniel Barbosa MD as PCP - General (Family Medicine)  Chloe Lew APRN - CNP (Nurse Practitioner)  Maria Teresa Palomares MD as Cardiologist (Cardiology)  Veronica Portillo, RN as Nurse Navigator (Oncology)  Tel: [phone]  IP CONSULT TO CARDIOLOGY  PALLIATIVE CARE EVAL  IP CONSULT TO NEPHROLOGY  Admitting Physician: No admitting provider for patient encounter.   Discharge Physician: Devonte Castro MD      MEDICAL SYNOPSIS     FOLLOW UP APPOINTMENTS:   No follow-up provider specified.    Code Status:  Full Code  Diet: ADULT DIET; Regular; 1500 ml  ADULT ORAL NUTRITION SUPPLEMENT; Breakfast, Dinner; Standard High Calorie/High Protein Oral Supplement    Final diagnoses:       Principal Problem:    Atrial fibrillation with rapid ventricular response (HCC)  Active Problems:    History of renal transplant    History of non-Hodgkin lymphoma    Hyperlipidemia    Immunosuppressed status (HCC)    Metastatic carcinoma involving bone with unknown primary site (HCC)    Primary malignant neoplasm of right lower lobe of lung (HCC)    History of lung cancer    Paroxysmal atrial fibrillation (HCC)    Tachyarrhythmia    Hyponatremia    NSCLC of right lung (HCC)    History of CVA (cerebrovascular accident)  Resolved Problems:    * No resolved hospital problems. *  PAF on Eliquis.  Hx NHL , in remission  Hx Renal Transplant   NSCLC on immuno-chemo therapy   Metastatic RLL NSCLC to Right Proximal Humerus: follows OP w/ Dr. Wong, [facility] Heme/Onc. On Keytruda, Peme/Carbo therapy; last infusion

## 2025-01-15 ENCOUNTER — TELEPHONE (OUTPATIENT)
Dept: CARDIOLOGY CLINIC | Age: 60
End: 2025-01-15

## 2025-01-15 NOTE — TELEPHONE ENCOUNTER
Patient has a hfu appt with néstor on 01/30/2025. He said he has chemo on 01/28, and usually 2 days after is when he really starts feeling bad. He was wanting to see if there were any openings on either 01/23/2025 or 02/03/2025 since he has other appts on those dates. If not, he wants to keep the one on 01/30.

## 2025-01-16 ENCOUNTER — TELEPHONE (OUTPATIENT)
Dept: ONCOLOGY | Age: 60
End: 2025-01-16

## 2025-01-16 ENCOUNTER — HOSPITAL ENCOUNTER (OUTPATIENT)
Dept: CT IMAGING | Age: 60
Discharge: HOME | End: 2025-01-16
Payer: COMMERCIAL

## 2025-01-16 VITALS
DIASTOLIC BLOOD PRESSURE: 62 MMHG | HEART RATE: 80 BPM | RESPIRATION RATE: 16 BRPM | TEMPERATURE: 97.2 F | OXYGEN SATURATION: 100 % | SYSTOLIC BLOOD PRESSURE: 122 MMHG

## 2025-01-16 DIAGNOSIS — C34.90 METASTATIC NON-SMALL CELL LUNG CANCER (HCC): Primary | ICD-10-CM

## 2025-01-16 DIAGNOSIS — C34.90 METASTATIC NON-SMALL CELL LUNG CANCER (HCC): ICD-10-CM

## 2025-01-16 LAB
BACTERIA BLD AEROBE CULT: NORMAL
BACTERIA BLD AEROBE CULT: NORMAL
DEPRECATED RDW RBC AUTO: 61.4 FL (ref 35–45)
ERYTHROCYTE [DISTWIDTH] IN BLOOD BY AUTOMATED COUNT: 21.2 % (ref 11.5–14.5)
HCT VFR BLD AUTO: 26.9 % (ref 42–52)
HGB BLD-MCNC: 7.7 GM/DL (ref 14–18)
MCH RBC QN AUTO: 22.8 PG (ref 26–33)
MCHC RBC AUTO-ENTMCNC: 28.6 GM/DL (ref 32.2–35.5)
MCV RBC AUTO: 79.8 FL (ref 80–94)
PLATELET # BLD AUTO: 130 THOU/MM3 (ref 130–400)
PMV BLD AUTO: 9.7 FL (ref 9.4–12.4)
RBC # BLD AUTO: 3.37 MILL/MM3 (ref 4.7–6.1)
SCAN OF BLOOD SMEAR: NORMAL
WBC # BLD AUTO: 2.9 THOU/MM3 (ref 4.8–10.8)

## 2025-01-16 PROCEDURE — 85027 COMPLETE CBC AUTOMATED: CPT

## 2025-01-16 PROCEDURE — 2580000003 HC RX 258: Performed by: RADIOLOGY

## 2025-01-16 PROCEDURE — 88341 IMHCHEM/IMCYTCHM EA ADD ANTB: CPT

## 2025-01-16 PROCEDURE — 6360000002 HC RX W HCPCS: Performed by: RADIOLOGY

## 2025-01-16 PROCEDURE — 77012 CT SCAN FOR NEEDLE BIOPSY: CPT

## 2025-01-16 PROCEDURE — 88307 TISSUE EXAM BY PATHOLOGIST: CPT

## 2025-01-16 PROCEDURE — 88333 PATH CONSLTJ SURG CYTO XM 1: CPT

## 2025-01-16 PROCEDURE — 88342 IMHCHEM/IMCYTCHM 1ST ANTB: CPT

## 2025-01-16 RX ORDER — MIDAZOLAM HYDROCHLORIDE 1 MG/ML
INJECTION, SOLUTION INTRAMUSCULAR; INTRAVENOUS PRN
Status: COMPLETED | OUTPATIENT
Start: 2025-01-16 | End: 2025-01-16

## 2025-01-16 RX ORDER — SODIUM CHLORIDE 450 MG/100ML
INJECTION, SOLUTION INTRAVENOUS CONTINUOUS
Status: DISCONTINUED | OUTPATIENT
Start: 2025-01-16 | End: 2025-01-17 | Stop reason: HOSPADM

## 2025-01-16 RX ORDER — MIDAZOLAM HYDROCHLORIDE 1 MG/ML
1 INJECTION, SOLUTION INTRAMUSCULAR; INTRAVENOUS ONCE
Status: DISCONTINUED | OUTPATIENT
Start: 2025-01-16 | End: 2025-01-17 | Stop reason: HOSPADM

## 2025-01-16 RX ORDER — FENTANYL CITRATE 50 UG/ML
50 INJECTION, SOLUTION INTRAMUSCULAR; INTRAVENOUS ONCE
Status: DISCONTINUED | OUTPATIENT
Start: 2025-01-16 | End: 2025-01-17 | Stop reason: HOSPADM

## 2025-01-16 RX ORDER — FENTANYL CITRATE 50 UG/ML
INJECTION, SOLUTION INTRAMUSCULAR; INTRAVENOUS PRN
Status: COMPLETED | OUTPATIENT
Start: 2025-01-16 | End: 2025-01-16

## 2025-01-16 RX ADMIN — MIDAZOLAM 1 MG: 1 INJECTION INTRAMUSCULAR; INTRAVENOUS at 11:42

## 2025-01-16 RX ADMIN — MIDAZOLAM 0.5 MG: 1 INJECTION INTRAMUSCULAR; INTRAVENOUS at 11:51

## 2025-01-16 RX ADMIN — FENTANYL CITRATE 50 MCG: 50 INJECTION, SOLUTION INTRAMUSCULAR; INTRAVENOUS at 11:42

## 2025-01-16 RX ADMIN — SODIUM CHLORIDE: 4.5 INJECTION, SOLUTION INTRAVENOUS at 09:36

## 2025-01-16 RX ADMIN — FENTANYL CITRATE 25 MCG: 50 INJECTION, SOLUTION INTRAMUSCULAR; INTRAVENOUS at 11:51

## 2025-01-16 ASSESSMENT — PAIN SCALES - GENERAL: PAINLEVEL_OUTOF10: 6

## 2025-01-16 ASSESSMENT — PAIN DESCRIPTION - ORIENTATION: ORIENTATION: RIGHT

## 2025-01-16 ASSESSMENT — PAIN DESCRIPTION - LOCATION: LOCATION: SHOULDER;FLANK

## 2025-01-16 ASSESSMENT — PAIN DESCRIPTION - DESCRIPTORS: DESCRIPTORS: DISCOMFORT;ACHING

## 2025-01-16 NOTE — OP NOTE
Department of Radiology  Post Procedure Progress Note      Pre-Procedure Diagnosis: liver mets     Procedure Performed:  CT liver biopsy     Anesthesia: local / versed and fentanyl    Findings: successful    Immediate Complications:  None    Estimated Blood Loss: minimal    SEE DICTATED PROCEDURE NOTE FOR COMPLETE DETAILS.    Renan Camejo MD   1/16/2025 11:58 AM

## 2025-01-16 NOTE — TELEPHONE ENCOUNTER
Pt's wife has requested a new cbc order to be placed so they can have it drawn in the next few days at [place] for their peace of mind.     Thank you

## 2025-01-16 NOTE — PROGRESS NOTES
TRANSFER - OUT REPORT:    Verbal report given to Florence RN(name) on Ousmane Lockett being transferred to [facility](unit) for routine progression of patient care       Report consisted of patient's Situation, Background, Assessment and   Recommendations(SBAR).     Information from the following report(s) Nurse Handoff Report, Surgery Report, and MAR was reviewed with the receiving nurse.    Opportunity for questions and clarification was provided.      Patient transported with:   Monitor

## 2025-01-16 NOTE — H&P
Formulation and discussion of sedation / procedure plans, risks, benefits, side effects and alternatives with patient and/or responsible adult completed.    History and Physical reviewed and unchanged.    Electronically signed by Renan Camejo MD on 1/16/25 at 11:56 AM EST   
by mouth 2 times daily, Disp: 60 tablet, Rfl: 0    oxyCODONE (ROXICODONE) 5 MG immediate release tablet, Take 1 tablet by mouth every 4 hours as needed for Pain for up to 3 days. Max Daily Amount: 30 mg, Disp: 1 tablet, Rfl: 0    pantoprazole (PROTONIX) 40 MG tablet, Take 1 tablet by mouth every morning (before breakfast), Disp: 30 tablet, Rfl: 3    predniSONE (DELTASONE) 10 MG tablet, TAKE 1 TABLET DAILY, Disp: 90 tablet, Rfl: 1    sirolimus (RAPAMUNE) 1 MG tablet, Take 1 tablet by mouth daily, Disp: , Rfl:     polycarbophil (FIBERCON) 625 MG tablet, Take 1 tablet by mouth daily, Disp: , Rfl:     Cholecalciferol (VITAMIN D3) 1.25 MG (01427 UT) CAPS, Take by mouth, Disp: , Rfl:     furosemide (LASIX) 20 MG tablet, Take 1 tablet by mouth daily, Disp: 90 tablet, Rfl: 4    acyclovir (ZOVIRAX) 200 MG capsule, Take by mouth as needed, Disp: , Rfl:     Ascorbic Acid (VITAMIN C) 500 MG tablet, Take 2 tablets by mouth daily, Disp: , Rfl:     Current Facility-Administered Medications:     0.45 % sodium chloride infusion, , IntraVENous, Continuous, Renan Camejo MD, Last Rate: 20 mL/hr at 01/16/25 0936, New Bag at 01/16/25 0936    fentaNYL (SUBLIMAZE) injection 50 mcg, 50 mcg, IntraVENous, Once, Renan Camejo MD    midazolam (VERSED) injection 1 mg, 1 mg, IntraVENous, Once, Renan Camejo MD    fentaNYL (SUBLIMAZE) injection, , , PRN, Renan Camejo MD, 25 mcg at 01/16/25 1151    midazolam (VERSED) injection, , , PRN, Renan Camejo MD, 0.5 mg at 01/16/25 1151  Prior to Admission medications    Medication Sig Start Date End Date Taking? Authorizing Provider   apixaban (ELIQUIS) 5 MG TABS tablet Take 1 tablet by mouth 2 times daily   Yes Provider, MD Brandon   metoprolol tartrate (LOPRESSOR) 50 MG tablet Take 1 tablet by mouth 2 times daily 1/14/25   Devonte Castro MD   oxyCODONE (ROXICODONE) 5 MG immediate release tablet Take 1 tablet by mouth every 4 hours as needed for Pain for up to 3 days. Max Daily Amount:

## 2025-01-16 NOTE — PROGRESS NOTES
1130 Patient received in CT scanning for liver lesion biopsy. Monitor applied.   1136 Dr. Camejo here; spoke to patient. This procedure has been fully reviewed with the patient and written informed consent has been obtained.   1138 Patient assisted to CT table and pre scan started.  1138 Pathology called to CT area for procedure.   1148 Procedure started with Dr. Camejo.  1148 Pathology arrived to CT.   1163 Initial Samples collected and given to pathologist for further review.   1155 Procedure completed; patient tolerated well. Post scan obtained.   1156 Bacitracin oint, band aid to site; no bleeding noted.  1200 Patient on cart; comfort ensured.  1204 patient taken to OPN via cart/transport with family at bedside. Pt alert and oriented x3; follows commands. Skin pink, warm, and dry. Respirations easy, regular, and nonlabored.

## 2025-01-16 NOTE — TELEPHONE ENCOUNTER
Received call from outpatient nursing RN stating that patient is prepped for biopsy, however CBC results showed a 3 point drop in hemoglobin from 10.7 on 1/14/025 to 7.7 today, 1/16/25. RN states that Dr. Camejo is refusing to perform the biopsy due to this and will not reschedule him until his counts are stable. RN is wondering how to proceed (i.e send the patient to the ER for blood transfusion), I informed her that I would get in touch with Dr. Awada for his recommendations.     Update: Dr. Awada to call RN directly.

## 2025-01-16 NOTE — PROGRESS NOTES
0915- patient arrived to [facility] ambulatory with wife for liver biopsy. Patient has had other biopsies before and is familiar with process. Patient states he was just discharged from the hospital on Tuesday for irregular heart rhythm. Patient states his Eliquis has been on hold since Sunday. Patient has been NPO since 530am and has a  (Monica-wife) post procedure. Vitals stable. IV inserted and lab work obtained order. Call light placed within reach. PT RIGHTS AND RESPONSIBILITIES OFFERED TO PT.    0936- Infusion started. Patient resting and denies needs.     1040- patient's hgb came back at 7.7 today, when checked on 1/14/25 it was 10.7 when patient got discharged. This RN called Dr Camejo to notify him and Dr Camejo stated that the patient needs to be rescheduled due to hgb drop.     1050- This RN talked to Dr Awada, the patient's oncologist, who stated that the patient needs the biopsy and that the hemoglobin drop is \"to be expected and because of chemotherapy induced anemia\". This RN called Dr Camejo and he stated they will proceed with biopsy as long as patient is clinically stable.     1115- patient to CT via transport and wife.     1220- Patient back in room. Denies pain. Vitals stable. Band-aid clean, dry, intact. Patient offered drink and snack.     1235- Vitals stable. Denies pain. Band-aid clean, dry, intact. Patient resting.     1250- Vitals stable. Denies pain. Band-aid clean, dry, intact.    1305- Vitals stable. Denies pain. Band-aid clean, dry, intact.    1335- Vitals stable. Denies pain. Band-aid clean, dry, intact.    1340- This RN called and talked with Nicolasa MA regarding the wife asking if she can get a hgb check this weekend just to ensure that the hgb doesn't drop further. Stated she would reach out to Dr Awada. Wife instructed to call office tomorrow and see if order was placed.     1350- Vitals stable. Denies pain. Band-aid clean, dry, intact. IV removed. Patient getting dressed with staff

## 2025-01-20 ENCOUNTER — LAB (OUTPATIENT)
Dept: LAB | Age: 60
End: 2025-01-20

## 2025-01-20 ENCOUNTER — OFFICE VISIT (OUTPATIENT)
Dept: CARDIOLOGY CLINIC | Age: 60
End: 2025-01-20
Payer: COMMERCIAL

## 2025-01-20 VITALS
BODY MASS INDEX: 26.08 KG/M2 | WEIGHT: 182.2 LBS | HEART RATE: 82 BPM | HEIGHT: 70 IN | SYSTOLIC BLOOD PRESSURE: 117 MMHG | DIASTOLIC BLOOD PRESSURE: 71 MMHG

## 2025-01-20 DIAGNOSIS — I48.0 PAROXYSMAL ATRIAL FIBRILLATION (HCC): Primary | ICD-10-CM

## 2025-01-20 DIAGNOSIS — E87.1 HYPONATREMIA: ICD-10-CM

## 2025-01-20 DIAGNOSIS — C34.90 METASTATIC NON-SMALL CELL LUNG CANCER (HCC): ICD-10-CM

## 2025-01-20 LAB
ANION GAP SERPL CALC-SCNC: 11 MEQ/L (ref 8–16)
ANISOCYTOSIS BLD QL SMEAR: PRESENT
BASOPHILS ABSOLUTE: 0 THOU/MM3 (ref 0–0.1)
BASOPHILS NFR BLD AUTO: 0.2 %
BUN SERPL-MCNC: 11 MG/DL (ref 7–22)
CALCIUM SERPL-MCNC: 8.9 MG/DL (ref 8.5–10.5)
CHLORIDE SERPL-SCNC: 102 MEQ/L (ref 98–111)
CO2 SERPL-SCNC: 27 MEQ/L (ref 23–33)
CREAT SERPL-MCNC: 0.6 MG/DL (ref 0.4–1.2)
DEPRECATED RDW RBC AUTO: 64.3 FL (ref 35–45)
EOSINOPHIL NFR BLD AUTO: 1.1 %
EOSINOPHILS ABSOLUTE: 0.1 THOU/MM3 (ref 0–0.4)
ERYTHROCYTE [DISTWIDTH] IN BLOOD BY AUTOMATED COUNT: 23 % (ref 11.5–14.5)
GFR SERPL CREATININE-BSD FRML MDRD: > 90 ML/MIN/1.73M2
GLUCOSE SERPL-MCNC: 105 MG/DL (ref 70–108)
HCT VFR BLD AUTO: 28.8 % (ref 42–52)
HGB BLD-MCNC: 8.1 GM/DL (ref 14–18)
HYPOCHROMIA BLD QL SMEAR: PRESENT
IMM GRANULOCYTES # BLD AUTO: 0.03 THOU/MM3 (ref 0–0.07)
IMM GRANULOCYTES NFR BLD AUTO: 0.7 %
LYMPHOCYTES ABSOLUTE: 2.3 THOU/MM3 (ref 1–4.8)
LYMPHOCYTES NFR BLD AUTO: 50 %
MCH RBC QN AUTO: 23.3 PG (ref 26–33)
MCHC RBC AUTO-ENTMCNC: 28.1 GM/DL (ref 32.2–35.5)
MCV RBC AUTO: 82.8 FL (ref 80–94)
MONOCYTES ABSOLUTE: 0.8 THOU/MM3 (ref 0.4–1.3)
MONOCYTES NFR BLD AUTO: 17.5 %
NEUTROPHILS ABSOLUTE: 1.4 THOU/MM3 (ref 1.8–7.7)
NEUTROPHILS NFR BLD AUTO: 30.5 %
NRBC BLD AUTO-RTO: 0 /100 WBC
PLATELET # BLD AUTO: 104 THOU/MM3 (ref 130–400)
PMV BLD AUTO: 10.1 FL (ref 9.4–12.4)
POTASSIUM SERPL-SCNC: 4.7 MEQ/L (ref 3.5–5.2)
RBC # BLD AUTO: 3.48 MILL/MM3 (ref 4.7–6.1)
SODIUM SERPL-SCNC: 140 MEQ/L (ref 135–145)
WBC # BLD AUTO: 4.6 THOU/MM3 (ref 4.8–10.8)

## 2025-01-20 PROCEDURE — 3074F SYST BP LT 130 MM HG: CPT | Performed by: PHYSICIAN ASSISTANT

## 2025-01-20 PROCEDURE — 3078F DIAST BP <80 MM HG: CPT | Performed by: PHYSICIAN ASSISTANT

## 2025-01-20 PROCEDURE — 99214 OFFICE O/P EST MOD 30 MIN: CPT | Performed by: PHYSICIAN ASSISTANT

## 2025-01-20 RX ORDER — METOPROLOL TARTRATE 50 MG
50 TABLET ORAL 2 TIMES DAILY
Qty: 180 TABLET | Refills: 3 | Status: SHIPPED | OUTPATIENT
Start: 2025-01-20

## 2025-01-20 NOTE — PROGRESS NOTES
Hospital follow up.    Last EKG done on 01/12/2025.    Patient was in the hospital on 01/12/2025 due to A-Fib.    Reports shortness of breath with exertion. Patient currently has lung cancer.    Denies chest pain, palpitations, dizziness, and edema.

## 2025-01-20 NOTE — PROGRESS NOTES
[facility] [place] [facility]  [facility] CARDIOLOGY  [address]  [address]  [place] [address]  Dept: [phone]  Dept Fax: [phone]  Loc: [phone]    Chief Complaint   Patient presents with    Follow-Up from Hospital     Hospital follow up.       History of Present Illness  The patient is a 59-year-old male with a history of non-small cell lung cancer, on immunotherapy, and paroxysmal atrial fibrillation, presenting for follow-up after a recent hospitalization.    He was previously admitted to the hospital due to a high fever, during which he experienced a rapid heart rate. A 30-day monitor was initiated at that time. Two weekends ago, he reported a recurrence of tachycardia, accompanied by shortness of breath. He was advised against self-administration of metoprolol. During his recent emergency department visit, he received an intravenous push of Lopressor, which successfully restored his heart rhythm. His metoprolol dosage was subsequently increased from 25 mg twice daily to 50 mg twice daily. Since his discharge, he has been generally well.     He has a history of anemia, believed to be a side effect of his cancer treatment. He received a blood transfusion during his hospital stay and an iron infusion 2 weeks ago. He was advised to discontinue iron tablets post-infusion but reports no improvement in his condition. He is contemplating another iron infusion.      MEDICATIONS  Eliquis, Lasix, metoprolol.       Cardiologist:  Dr. Bahena        General:  fatigue  Pulmonary:    No dyspnea, no wheezing  Cardiac:    Denies recent chest pain   GI:     No nausea or vomiting, no abdominal pain  Neuro:    No dizziness or light headedness  Musculoskeletal:  No recent active issues  Extremities:   No edema, good peripheral pulses      Past Medical History:   Diagnosis Date    Cancer (HCC)     CVA (cerebral vascular accident) (HCC)     with chemo - residual numbness in left hand

## 2025-01-22 ENCOUNTER — OFFICE VISIT (OUTPATIENT)
Dept: ONCOLOGY | Age: 60
End: 2025-01-22
Payer: COMMERCIAL

## 2025-01-22 ENCOUNTER — HOSPITAL ENCOUNTER (OUTPATIENT)
Dept: INFUSION THERAPY | Age: 60
Discharge: HOME | End: 2025-01-22
Payer: COMMERCIAL

## 2025-01-22 ENCOUNTER — CLINICAL DOCUMENTATION (OUTPATIENT)
Dept: CASE MANAGEMENT | Age: 60
End: 2025-01-22

## 2025-01-22 VITALS
HEIGHT: 70 IN | TEMPERATURE: 97.6 F | BODY MASS INDEX: 26.2 KG/M2 | OXYGEN SATURATION: 94 % | DIASTOLIC BLOOD PRESSURE: 56 MMHG | SYSTOLIC BLOOD PRESSURE: 121 MMHG | WEIGHT: 183 LBS | HEART RATE: 92 BPM | RESPIRATION RATE: 18 BRPM

## 2025-01-22 VITALS
RESPIRATION RATE: 18 BRPM | SYSTOLIC BLOOD PRESSURE: 121 MMHG | TEMPERATURE: 97.6 F | HEIGHT: 70 IN | OXYGEN SATURATION: 94 % | HEART RATE: 92 BPM | DIASTOLIC BLOOD PRESSURE: 56 MMHG | BODY MASS INDEX: 26.2 KG/M2 | WEIGHT: 183 LBS

## 2025-01-22 DIAGNOSIS — C34.90 METASTATIC NON-SMALL CELL LUNG CANCER (HCC): Primary | ICD-10-CM

## 2025-01-22 DIAGNOSIS — D64.9 ANEMIA, UNSPECIFIED TYPE: ICD-10-CM

## 2025-01-22 DIAGNOSIS — C34.90 METASTATIC NON-SMALL CELL LUNG CANCER (HCC): ICD-10-CM

## 2025-01-22 LAB
ANTI-COMPLEMENT: NORMAL
BASOPHILS ABSOLUTE: 0 THOU/MM3 (ref 0–0.1)
BASOPHILS NFR BLD AUTO: 0 % (ref 0–3)
CANCER AG19-9 SERPL-ACNC: 8232 U/ML (ref 0–35)
DAT IGG: NORMAL
DAT POLY-SP REAG RBC QL: NORMAL
EOSINOPHIL NFR BLD AUTO: 1 % (ref 0–4)
EOSINOPHILS ABSOLUTE: 0.1 THOU/MM3 (ref 0–0.4)
ERYTHROCYTE [DISTWIDTH] IN BLOOD BY AUTOMATED COUNT: 23.1 % (ref 11.5–14.5)
FERRITIN SERPL IA-MCNC: 1115 NG/ML (ref 22–322)
FOLATE SERPL-MCNC: > 20 NG/ML (ref 4.8–24.2)
HAPTOGLOB SERPL-MCNC: 479 MG/DL (ref 30–200)
HCT VFR BLD AUTO: 28 % (ref 42–52)
HGB BLD-MCNC: 8.1 GM/DL (ref 14–18)
HGB RETIC QN AUTO: 21.8 PG (ref 28.2–35.7)
IMM RETICS NFR: 32.6 % (ref 2.3–13.4)
IMMATURE GRANULOCYTES %: 1 %
IMMATURE GRANULOCYTES ABSOLUTE: 0.06 THOU/MM3 (ref 0–0.07)
IRON SATN MFR SERPL: 19 % (ref 20–50)
IRON SERPL-MCNC: 32 UG/DL (ref 65–195)
LDH SERPL L TO P-CCNC: 186 U/L (ref 100–190)
LYMPHOCYTES ABSOLUTE: 1 THOU/MM3 (ref 1–4.8)
LYMPHOCYTES NFR BLD AUTO: 23 % (ref 15–47)
MCH RBC QN AUTO: 24.4 PG (ref 26–33)
MCHC RBC AUTO-ENTMCNC: 28.9 GM/DL (ref 32.2–35.5)
MCV RBC AUTO: 84 FL (ref 80–94)
MONOCYTES ABSOLUTE: 0.7 THOU/MM3 (ref 0.4–1.3)
MONOCYTES NFR BLD AUTO: 17 % (ref 0–12)
NEUTROPHILS ABSOLUTE: 2.5 THOU/MM3 (ref 1.8–7.7)
NEUTROPHILS NFR BLD AUTO: 57 % (ref 43–75)
PLATELET # BLD AUTO: 141 THOU/MM3 (ref 130–400)
PMV BLD AUTO: 9.4 FL (ref 9.4–12.4)
RBC # BLD AUTO: 3.32 MILL/MM3 (ref 4.7–6.1)
RETICS # AUTO: 82 THOU/MM3 (ref 20–115)
RETICS/RBC NFR AUTO: 2.5 % (ref 0.5–2)
REVIEWED BY: NORMAL
SIROLIMUS BLD-MCNC: 3.9 NG/ML
SMEAR REVIEW: NORMAL
TIBC SERPL-MCNC: 172 UG/DL (ref 171–450)
VIT B12 SERPL-MCNC: 328 PG/ML (ref 211–911)
WBC # BLD AUTO: 4.4 THOU/MM3 (ref 4.8–10.8)

## 2025-01-22 PROCEDURE — 85025 COMPLETE CBC W/AUTO DIFF WBC: CPT

## 2025-01-22 PROCEDURE — 82728 ASSAY OF FERRITIN: CPT

## 2025-01-22 PROCEDURE — 99211 OFF/OP EST MAY X REQ PHY/QHP: CPT

## 2025-01-22 PROCEDURE — 82746 ASSAY OF FOLIC ACID SERUM: CPT

## 2025-01-22 PROCEDURE — 99214 OFFICE O/P EST MOD 30 MIN: CPT | Performed by: INTERNAL MEDICINE

## 2025-01-22 PROCEDURE — 85046 RETICYTE/HGB CONCENTRATE: CPT

## 2025-01-22 PROCEDURE — 3078F DIAST BP <80 MM HG: CPT | Performed by: INTERNAL MEDICINE

## 2025-01-22 PROCEDURE — 83550 IRON BINDING TEST: CPT

## 2025-01-22 PROCEDURE — 83540 ASSAY OF IRON: CPT

## 2025-01-22 PROCEDURE — 3074F SYST BP LT 130 MM HG: CPT | Performed by: INTERNAL MEDICINE

## 2025-01-22 PROCEDURE — 86301 IMMUNOASSAY TUMOR CA 19-9: CPT

## 2025-01-22 PROCEDURE — 82607 VITAMIN B-12: CPT

## 2025-01-22 PROCEDURE — 36415 COLL VENOUS BLD VENIPUNCTURE: CPT

## 2025-01-22 PROCEDURE — 83615 LACTATE (LD) (LDH) ENZYME: CPT

## 2025-01-22 PROCEDURE — 83010 ASSAY OF HAPTOGLOBIN QUANT: CPT

## 2025-01-22 PROCEDURE — 86880 COOMBS TEST DIRECT: CPT

## 2025-01-22 RX ORDER — SODIUM CHLORIDE 0.9 % (FLUSH) 0.9 %
5-40 SYRINGE (ML) INJECTION PRN
OUTPATIENT
Start: 2025-01-22

## 2025-01-22 RX ORDER — FAMOTIDINE 10 MG/ML
20 INJECTION, SOLUTION INTRAVENOUS
OUTPATIENT
Start: 2025-01-22

## 2025-01-22 RX ORDER — SODIUM CHLORIDE 9 MG/ML
20 INJECTION, SOLUTION INTRAVENOUS CONTINUOUS
OUTPATIENT
Start: 2025-01-22

## 2025-01-22 RX ORDER — DIPHENHYDRAMINE HYDROCHLORIDE 50 MG/ML
50 INJECTION INTRAMUSCULAR; INTRAVENOUS
OUTPATIENT
Start: 2025-01-22

## 2025-01-22 RX ORDER — ALBUTEROL SULFATE 90 UG/1
4 INHALANT RESPIRATORY (INHALATION) PRN
OUTPATIENT
Start: 2025-01-22

## 2025-01-22 RX ORDER — ACETAMINOPHEN 325 MG/1
650 TABLET ORAL
OUTPATIENT
Start: 2025-01-22

## 2025-01-22 RX ORDER — HYDROCORTISONE SODIUM SUCCINATE 100 MG/2ML
100 INJECTION INTRAMUSCULAR; INTRAVENOUS
OUTPATIENT
Start: 2025-01-22

## 2025-01-22 RX ORDER — ONDANSETRON 2 MG/ML
8 INJECTION INTRAMUSCULAR; INTRAVENOUS
OUTPATIENT
Start: 2025-01-22

## 2025-01-22 RX ORDER — SODIUM CHLORIDE 9 MG/ML
INJECTION, SOLUTION INTRAVENOUS CONTINUOUS
OUTPATIENT
Start: 2025-01-22

## 2025-01-22 RX ORDER — EPINEPHRINE 1 MG/ML
0.3 INJECTION, SOLUTION, CONCENTRATE INTRAVENOUS PRN
OUTPATIENT
Start: 2025-01-22

## 2025-01-22 RX ORDER — SODIUM CHLORIDE 9 MG/ML
25 INJECTION, SOLUTION INTRAVENOUS PRN
OUTPATIENT
Start: 2025-01-22

## 2025-01-22 NOTE — PROGRESS NOTES
Informed Consent for Blood Component Transfusion Note    I have discussed with the patient the rationale for blood component transfusion; its benefits in treating or preventing fatigue, organ damage, or death; and its risk which includes mild transfusion reactions, rare risk of blood borne infection, or more serious but rare reactions. I have discussed the alternatives to transfusion, including the risk and consequences of not receiving transfusion. The patient had an opportunity to ask questions and had agreed to proceed with transfusion of blood components.    Electronically signed by Hassan Awada, MD on 1/22/25 at 9:49 AM EST  
[facility].  Next treatment is scheduled for 1/28/25.    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    1/22/25: Educated the patient on results which confirms metastatic disease of the same primary lung malignancy.  -Informed him about the plan to send a new FoundationOne NGS testing to assess for any newly targetable mutations that could have occurred during course of progression.  -After a lengthy discussion with the patient, the plan is to continue current treatment with Carboplatin/Pemetrexed/Pembrolizumab. His next treatment is scheduled for next week on 1/28/25 at [facility].  -He confirmed to me that he also is scheduled for restaging scans at [facility] on 2/25/25 during which he will be evaluated by thoracic medical oncology to discuss therapeutic recommendations.  Based on this evaluation, we will arrange for the local delivery of the recommended systemic treatment (unless it is a clinical trial) at our infusion center based on patient's preference as he wishes to reduce travel burden.    -He will continue to follow with [facility] thoracic medical oncology regarding his cancer care and we will follow the recommendations.  -Plan for virtual visit on 2/26/2025 to discuss treatment plan.    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  -Status post radiation to right humeral head lesion (3000 cGy) between 4/22/24 - 5/3/24.   -Underwent right proximal humerus resection, total Rt shoulder arthroplasty (8/6/24). Pathology confirmed metastatic adenosquamous carcinoma.   -Educated the patient that Xgeva (Denosumab) is approved for prevention of skeletal-related events in patients with bone metastases from solid tumors.    #Iron deficiency anemia  -Blood work at [facility] (12/17/24) showed microcytic anemia Hgb 8.1, ferritin 119, Iron 24, Iron sat 10%, TIBC 231, and Transferrin 185.  -Has been ordered IV

## 2025-01-22 NOTE — PATIENT INSTRUCTIONS
-Ordered blood work for today.   -Ordered FoundationOne NGS from tissue biopsy.  -Arrange for a virtual visit on 2/26/2025 (PM)

## 2025-01-23 NOTE — PROGRESS NOTES
Name: Ousmane Lockett  : 1965  MRN: [id number]    Oncology Navigation     Contact Type:  Medical Oncology    Subjective: pt here today for F/U & review biopsy     Oncology Plan of Care :    -review biopsy-confirmed metastatic disease to liver    -MD ordered foundation one on liver biopsy    -plan to continue tx @[facility] 25     Restaging scans scheduled [facility] 2025    -virtual visit  to discuss tx plan    -labs today     Assistance Needed: denies any at this time    Receptive to Advanced Care Planning / Palliative Care:  deferred    Education: isaac reiterated ONC POC    Notes: iasac following to assist & support as needed    Electronically signed by Veronica Portillo RN on 2025 at 8:49 AM

## 2025-01-24 ENCOUNTER — CASE MANAGEMENT (OUTPATIENT)
Dept: CASE MANAGEMENT | Age: 60
End: 2025-01-24

## 2025-01-24 NOTE — PROGRESS NOTES
Name: Ousmane Lockett  : 1965  MRN: [id number]    Oncology Navigation     Contact Type:  Telephone    Notes: isaac called pt and reviewed lab work. No further questions.     Electronically signed by Veronica Portillo RN on 2025 at 12:48 PM

## 2025-01-25 LAB
FOUNDATION MEDICINE RESULT STATUS: NORMAL
FOUNDATION MEDICINE RESULT STATUS: NORMAL

## 2025-01-29 LAB — FOUNDATION MEDICINE RESULT STATUS: NORMAL

## 2025-01-31 ENCOUNTER — CASE MANAGEMENT (OUTPATIENT)
Dept: CASE MANAGEMENT | Age: 60
End: 2025-01-31

## 2025-01-31 DIAGNOSIS — C34.90 METASTATIC NON-SMALL CELL LUNG CANCER (HCC): Primary | ICD-10-CM

## 2025-01-31 LAB — FOUNDATION MEDICINE RESULT STATUS: NORMAL

## 2025-01-31 NOTE — PROGRESS NOTES
Name: Ousmane Lockett  : 1965  MRN: [id number]    Oncology Navigation    Contact Type:  Telephone    Notes: ifeanyi received call from pt-requesting for local palliative care.   Ifeanyi contacted Dr. Awada -referral placed Dr. Hercules -palliative care.    Electronically signed by Veronica Portillo RN on 2025 at 12:47 PM   
no

## 2025-02-02 ENCOUNTER — APPOINTMENT (OUTPATIENT)
Dept: GENERAL RADIOLOGY | Age: 60
End: 2025-02-02
Payer: COMMERCIAL

## 2025-02-02 ENCOUNTER — HOSPITAL ENCOUNTER (EMERGENCY)
Age: 60
Discharge: HOME | End: 2025-02-02
Attending: EMERGENCY MEDICINE
Payer: COMMERCIAL

## 2025-02-02 VITALS
SYSTOLIC BLOOD PRESSURE: 114 MMHG | OXYGEN SATURATION: 99 % | DIASTOLIC BLOOD PRESSURE: 58 MMHG | HEIGHT: 69 IN | RESPIRATION RATE: 16 BRPM | BODY MASS INDEX: 26.96 KG/M2 | TEMPERATURE: 97.6 F | HEART RATE: 88 BPM | WEIGHT: 182 LBS

## 2025-02-02 DIAGNOSIS — R00.2 PALPITATIONS: Primary | ICD-10-CM

## 2025-02-02 LAB
ALBUMIN SERPL BCG-MCNC: 3.1 G/DL (ref 3.5–5.1)
ALP SERPL-CCNC: 173 U/L (ref 38–126)
ALT SERPL W/O P-5'-P-CCNC: 39 U/L (ref 11–66)
ANION GAP SERPL CALC-SCNC: 10 MEQ/L (ref 8–16)
AST SERPL-CCNC: 24 U/L (ref 5–40)
BILIRUB SERPL-MCNC: 0.2 MG/DL (ref 0.3–1.2)
BUN SERPL-MCNC: 15 MG/DL (ref 7–22)
CALCIUM SERPL-MCNC: 8.3 MG/DL (ref 8.5–10.5)
CHLORIDE SERPL-SCNC: 96 MEQ/L (ref 98–111)
CO2 SERPL-SCNC: 25 MEQ/L (ref 23–33)
CREAT SERPL-MCNC: 0.5 MG/DL (ref 0.4–1.2)
DEPRECATED RDW RBC AUTO: 68.7 FL (ref 35–45)
EKG ATRIAL RATE: 108 BPM
EKG ATRIAL RATE: 94 BPM
EKG P AXIS: 43 DEGREES
EKG P AXIS: 60 DEGREES
EKG P-R INTERVAL: 124 MS
EKG P-R INTERVAL: 170 MS
EKG Q-T INTERVAL: 324 MS
EKG Q-T INTERVAL: 356 MS
EKG QRS DURATION: 86 MS
EKG QRS DURATION: 94 MS
EKG QTC CALCULATION (BAZETT): 434 MS
EKG QTC CALCULATION (BAZETT): 445 MS
EKG R AXIS: -31 DEGREES
EKG R AXIS: -39 DEGREES
EKG T AXIS: 22 DEGREES
EKG T AXIS: 49 DEGREES
EKG VENTRICULAR RATE: 108 BPM
EKG VENTRICULAR RATE: 94 BPM
ERYTHROCYTE [DISTWIDTH] IN BLOOD BY AUTOMATED COUNT: 22.6 % (ref 11.5–14.5)
GFR SERPL CREATININE-BSD FRML MDRD: > 90 ML/MIN/1.73M2
GLUCOSE SERPL-MCNC: 109 MG/DL (ref 70–108)
HCT VFR BLD AUTO: 27.7 % (ref 42–52)
HGB BLD-MCNC: 8.1 GM/DL (ref 14–18)
MAGNESIUM SERPL-MCNC: 1.9 MG/DL (ref 1.6–2.4)
MCH RBC QN AUTO: 24.3 PG (ref 26–33)
MCHC RBC AUTO-ENTMCNC: 29.2 GM/DL (ref 32.2–35.5)
MCV RBC AUTO: 83.2 FL (ref 80–94)
OSMOLALITY SERPL CALC.SUM OF ELEC: 264.1 MOSMOL/KG (ref 275–300)
PLATELET # BLD AUTO: 311 THOU/MM3 (ref 130–400)
PMV BLD AUTO: 9.4 FL (ref 9.4–12.4)
POTASSIUM SERPL-SCNC: 4 MEQ/L (ref 3.5–5.2)
PROT SERPL-MCNC: 5.4 G/DL (ref 6.1–8)
RBC # BLD AUTO: 3.33 MILL/MM3 (ref 4.7–6.1)
SODIUM SERPL-SCNC: 131 MEQ/L (ref 135–145)
TROPONIN, HIGH SENSITIVITY: 27 NG/L (ref 0–12)
WBC # BLD AUTO: 9.5 THOU/MM3 (ref 4.8–10.8)

## 2025-02-02 PROCEDURE — 83735 ASSAY OF MAGNESIUM: CPT

## 2025-02-02 PROCEDURE — 93005 ELECTROCARDIOGRAM TRACING: CPT

## 2025-02-02 PROCEDURE — 36415 COLL VENOUS BLD VENIPUNCTURE: CPT

## 2025-02-02 PROCEDURE — 84484 ASSAY OF TROPONIN QUANT: CPT

## 2025-02-02 PROCEDURE — 99285 EMERGENCY DEPT VISIT HI MDM: CPT

## 2025-02-02 PROCEDURE — 80053 COMPREHEN METABOLIC PANEL: CPT

## 2025-02-02 PROCEDURE — 85027 COMPLETE CBC AUTOMATED: CPT

## 2025-02-02 PROCEDURE — 71046 X-RAY EXAM CHEST 2 VIEWS: CPT

## 2025-02-02 ASSESSMENT — PAIN - FUNCTIONAL ASSESSMENT
PAIN_FUNCTIONAL_ASSESSMENT: NONE - DENIES PAIN
PAIN_FUNCTIONAL_ASSESSMENT: NONE - DENIES PAIN

## 2025-02-03 NOTE — ED PROVIDER NOTES
PATIENT NAME: Ousmane Lockett  MRN: [id number]  : 1965  KLEIN: 2025    I performed a history and physical examination of the patient and discussed management with the Resident. I reviewed the Resident's note and agree with the documented findings and plan of care. Any areas of disagreement are noted on the chart. I was personally present for the key portions of any procedures and have documented in the chart those procedures where I was not present during the key portions. I have reviewed the emergency nurses triage note and agree with the chief complaint, past medical history, past surgical history, allergies, medications, social and family history as documented unless otherwise noted below.    MEDICAL DEDISION MAKING (MDM)     Ousmane Lockett is a 59 y.o. male who presents to Emergency Department with Palpitations     His PMH is remarkable for lung CA with distant mets, lymphoma, Afib with RVR on BB and Eliquis, and renal transplant. He has frequent Afib with RVR and tonight took an extra dose of Metoprolol 50 mg when feeling palpitation before coming to ED.     His ED workup is reassuring. EKG is NSR with VR 97/min, no acute ischemic changes.     Discharge in stable conditions.     Vitals:    25 1911 25 2106   BP: 116/63 (!) 114/58   Pulse: 90 88   Resp: 19 16   Temp: 97.6 °F (36.4 °C)    TempSrc: Oral    SpO2: 97% 99%   Weight: 82.6 kg (182 lb)    Height: 1.753 m (5' 9\")      Labs Reviewed   CBC - Abnormal; Notable for the following components:       Result Value    RBC 3.33 (*)     Hemoglobin 8.1 (*)     Hematocrit 27.7 (*)     MCH 24.3 (*)     MCHC 29.2 (*)     RDW-CV 22.6 (*)     RDW-SD 68.7 (*)     All other components within normal limits   COMPREHENSIVE METABOLIC PANEL - Abnormal; Notable for the following components:    Glucose 109 (*)     Sodium 131 (*)     Chloride 96 (*)     Calcium 8.3 (*)     Alkaline Phosphatase 173 (*)     Total Protein 5.4 (*)     Albumin 3.1 (*)

## 2025-02-03 NOTE — ED PROVIDER NOTES
[facility]  EMERGENCY DEPARTMENT ENCOUNTER          Pt Name: Ousmane Lockett  MRN: [id number]  Birthdate 1965  Date of evaluation: 2/2/2025  Treating Resident Physician: Jay Osman DO  Supervising Physician: Samm Bae    History obtained from the patient.    CHIEF COMPLAINT       Chief Complaint   Patient presents with    Palpitations         HISTORY OF PRESENT ILLNESS    HPI  Ousmane Lockett is a 59 y.o. male who presents to the emergency department for evaluation of palpitations x 3 days, started after receiving chemo infusion for metastatic NSCLC. States he has noticed a pattern of feeling palpitations after receiving chemo. Has chemo treatment every 3 weeks. States he \"goes into Afib\" after every round of treatment. Is on Lopressor 50mg BID and Eliquis 5mg BID.    Diagnosed with cancer back in July 2024. Patient has a Pleurx drain due to lobectomy of right lobe. Had liver biopsy a few weeks ago.      REVIEW OF SYSTEMS   Review of Systems   Constitutional:  Negative for chills and fever.   Respiratory:  Negative for cough.    Cardiovascular:  Positive for palpitations. Negative for chest pain.   Gastrointestinal:  Negative for abdominal pain, diarrhea, nausea and vomiting.   Genitourinary:  Negative for dysuria and hematuria.   Skin:  Negative for rash.   Neurological:  Negative for dizziness, light-headedness and headaches.   All other systems reviewed and are negative.        PAST MEDICAL AND SURGICAL HISTORY     Past Medical History:   Diagnosis Date    Cancer (HCC)     CVA (cerebral vascular accident) (HCC)     with chemo - residual numbness in left hand    Hematoma     right leg s/p fall    Hx of blood clots     Arm    Hyperlipidemia     Hypertension     Irregular heart rate 11/30/2024    Non Hodgkin's lymphoma (HCC)     1995/96    NSCLC of right lung (HCC) 07/03/2024    Pneumonia 09/29/2024    Renal transplant recipient     [facility] transplant

## 2025-02-03 NOTE — DISCHARGE INSTR - COC
Discharging to Facility/ Agency   Name:   Address:  Phone:  Fax:    Dialysis Facility (if applicable)   Name:  Address:  Dialysis Schedule:  Phone:  Fax:    / signature: {Esignature:[id number]}    PHYSICIAN SECTION    Prognosis: {Prognosis:6774623700}    Condition at Discharge: { Patient Condition:963753554}    Rehab Potential (if transferring to Rehab): {Prognosis:2795513413}    Recommended Labs or Other Treatments After Discharge: ***    Physician Certification: I certify the above information and transfer of Ousmane Lockett  is necessary for the continuing treatment of the diagnosis listed and that he requires {Admit to Appropriate Level of Care:29044} for {GREATER/LESS:635555208} 30 days.     Update Admission H&P: {CHP DME Changes in HandP:542495458}    PHYSICIAN SIGNATURE:  {Esignature:564104700}

## 2025-02-03 NOTE — ED TRIAGE NOTES
Patient presents to ED from home with C/O palpitations. Patient states he has history of afib and believes he has been going into afib this week since Friday. Patient states he has metastatic lung cancer and his last chemo treatment was Tuesday and hasn't felt well since. Patient states on Friday he began to feel his heart go in and out of afib. VS stable. EKG complete.

## 2025-02-05 ENCOUNTER — TELEPHONE (OUTPATIENT)
Dept: ONCOLOGY | Age: 60
End: 2025-02-05

## 2025-02-05 NOTE — TELEPHONE ENCOUNTER
Called patient and asked him if he can come in tomorrow for the MediaP[employer] Liquid Bx test, he did confirm that he can come in tomorrow before his cardiology appointment.

## 2025-02-06 ENCOUNTER — OFFICE VISIT (OUTPATIENT)
Dept: CARDIOLOGY CLINIC | Age: 60
End: 2025-02-06
Payer: COMMERCIAL

## 2025-02-06 VITALS
HEART RATE: 83 BPM | BODY MASS INDEX: 27.85 KG/M2 | WEIGHT: 188 LBS | HEIGHT: 69 IN | DIASTOLIC BLOOD PRESSURE: 72 MMHG | SYSTOLIC BLOOD PRESSURE: 122 MMHG

## 2025-02-06 DIAGNOSIS — C34.90 METASTATIC NON-SMALL CELL LUNG CANCER (HCC): Primary | ICD-10-CM

## 2025-02-06 DIAGNOSIS — C79.51 NON-SMALL CELL LUNG CANCER METASTATIC TO BONE (HCC): ICD-10-CM

## 2025-02-06 DIAGNOSIS — C34.90 NON-SMALL CELL LUNG CANCER METASTATIC TO BONE (HCC): ICD-10-CM

## 2025-02-06 DIAGNOSIS — I48.0 PAROXYSMAL ATRIAL FIBRILLATION (HCC): Primary | ICD-10-CM

## 2025-02-06 PROCEDURE — 99214 OFFICE O/P EST MOD 30 MIN: CPT | Performed by: STUDENT IN AN ORGANIZED HEALTH CARE EDUCATION/TRAINING PROGRAM

## 2025-02-06 PROCEDURE — 3078F DIAST BP <80 MM HG: CPT | Performed by: STUDENT IN AN ORGANIZED HEALTH CARE EDUCATION/TRAINING PROGRAM

## 2025-02-06 PROCEDURE — 3074F SYST BP LT 130 MM HG: CPT | Performed by: STUDENT IN AN ORGANIZED HEALTH CARE EDUCATION/TRAINING PROGRAM

## 2025-02-06 NOTE — PROGRESS NOTES
peripheral pulses  Neurological:   Awake, alert, oriented. No obvious focal deficits  Musculoskelatal:  No obvious deformities   /72   Pulse 83   Ht 1.753 m (5' 9\")   Wt 85.3 kg (188 lb)   BMI 27.76 kg/m²     Assessment:  Assessment & Plan    Diagnosis Orders   1. Paroxysmal atrial fibrillation (HCC)            Plan:  PAF- would recommned referral to EP which he is agreeable for. On eliquis and metoprlolol with lower BP. Not a good candidate for flecainide or amio. Canot titrate bb/ccb.      Orders Placed:  No orders of the defined types were placed in this encounter.      Disposition:  EP referral.   F/u with Dr Newby as scheduled or sooner if needed.    Discussed use, benefit, and side effects of prescribed medications. All patient questions answered. Pt voiced understanding. Instructed to continue current medications, diet and exercise. Continue risk factor modification and medical management. Patient agreed with treatment plan. Follow up as directed.    Electronically signedby Connor Mortimer, PA-C on 2/6/2025 at 3:27 PM

## 2025-02-08 LAB — FOUNDATION MEDICINE RESULT STATUS: NORMAL

## 2025-02-11 ENCOUNTER — HOSPITAL ENCOUNTER (OUTPATIENT)
Dept: GENERAL RADIOLOGY | Age: 60
Discharge: HOME | End: 2025-02-11

## 2025-02-11 ENCOUNTER — HOSPITAL ENCOUNTER (OUTPATIENT)
Dept: CT IMAGING | Age: 60
Discharge: HOME | End: 2025-02-11
Attending: RADIOLOGY

## 2025-02-11 ENCOUNTER — HOSPITAL ENCOUNTER (OUTPATIENT)
Dept: MRI IMAGING | Age: 60
Discharge: HOME | End: 2025-02-11
Attending: RADIOLOGY

## 2025-02-11 ENCOUNTER — LAB (OUTPATIENT)
Dept: LAB | Age: 60
End: 2025-02-11

## 2025-02-11 DIAGNOSIS — Z00.6 EXAMINATION FOR NORMAL COMPARISON FOR CLINICAL RESEARCH: ICD-10-CM

## 2025-02-11 LAB
ANION GAP SERPL CALC-SCNC: 15 MEQ/L (ref 8–16)
BUN SERPL-MCNC: 10 MG/DL (ref 7–22)
CHLORIDE SERPL-SCNC: 99 MEQ/L (ref 98–111)
CO2 SERPL-SCNC: 23 MEQ/L (ref 23–33)
CREAT SERPL-MCNC: 0.5 MG/DL (ref 0.4–1.2)
GFR SERPL CREATININE-BSD FRML MDRD: > 90 ML/MIN/1.73M2
POTASSIUM SERPL-SCNC: 4.6 MEQ/L (ref 3.5–5.2)
SODIUM SERPL-SCNC: 137 MEQ/L (ref 135–145)

## 2025-02-13 ENCOUNTER — OFFICE VISIT (OUTPATIENT)
Dept: PALLATIVE CARE | Age: 60
End: 2025-02-13
Payer: COMMERCIAL

## 2025-02-13 VITALS
SYSTOLIC BLOOD PRESSURE: 102 MMHG | HEIGHT: 69 IN | HEART RATE: 76 BPM | DIASTOLIC BLOOD PRESSURE: 56 MMHG | RESPIRATION RATE: 17 BRPM | WEIGHT: 185 LBS | OXYGEN SATURATION: 93 % | BODY MASS INDEX: 27.4 KG/M2

## 2025-02-13 DIAGNOSIS — C79.51 PAIN DUE TO MALIGNANT NEOPLASM METASTATIC TO BONE (HCC): ICD-10-CM

## 2025-02-13 DIAGNOSIS — C34.90 METASTATIC NON-SMALL CELL LUNG CANCER (HCC): Primary | ICD-10-CM

## 2025-02-13 DIAGNOSIS — G89.3 CANCER RELATED PAIN: ICD-10-CM

## 2025-02-13 DIAGNOSIS — G89.3 PAIN DUE TO MALIGNANT NEOPLASM METASTATIC TO BONE (HCC): ICD-10-CM

## 2025-02-13 DIAGNOSIS — Z51.5 PALLIATIVE CARE PATIENT: ICD-10-CM

## 2025-02-13 DIAGNOSIS — Z94.0 HISTORY OF RENAL TRANSPLANT: ICD-10-CM

## 2025-02-13 LAB — SIROLIMUS BLD-MCNC: 4.1 NG/ML

## 2025-02-13 PROCEDURE — 99204 OFFICE O/P NEW MOD 45 MIN: CPT

## 2025-02-13 PROCEDURE — 3074F SYST BP LT 130 MM HG: CPT

## 2025-02-13 PROCEDURE — 3078F DIAST BP <80 MM HG: CPT

## 2025-02-13 RX ORDER — GLUCOSA SU 2KCL/CHONDROITIN SU 500-400 MG
1 CAPSULE ORAL DAILY
COMMUNITY

## 2025-02-13 RX ORDER — DEXAMETHASONE 4 MG/1
4 TABLET ORAL 2 TIMES DAILY
COMMUNITY

## 2025-02-13 RX ORDER — OXYCODONE HYDROCHLORIDE 10 MG/1
10 TABLET ORAL EVERY 4 HOURS PRN
Qty: 180 TABLET | Refills: 0 | Status: SHIPPED | OUTPATIENT
Start: 2025-02-13 | End: 2025-03-15

## 2025-02-13 RX ORDER — PREDNISONE 20 MG/1
TABLET ORAL
Qty: 23 TABLET | Refills: 0 | Status: CANCELLED | OUTPATIENT
Start: 2025-02-13 | End: 2025-02-28

## 2025-02-13 RX ORDER — FENTANYL 25 UG/1
1 PATCH TRANSDERMAL
COMMUNITY
Start: 2025-02-04 | End: 2025-02-13 | Stop reason: ALTCHOICE

## 2025-02-13 RX ORDER — PROCHLORPERAZINE MALEATE 10 MG
TABLET ORAL
COMMUNITY
Start: 2024-11-20

## 2025-02-13 RX ORDER — OXYCODONE HYDROCHLORIDE 5 MG/1
5-10 TABLET ORAL EVERY 4 HOURS PRN
COMMUNITY
Start: 2025-02-11 | End: 2025-02-13 | Stop reason: ALTCHOICE

## 2025-02-13 RX ORDER — POLYETHYLENE GLYCOL 3350 17 G/17G
17 POWDER, FOR SOLUTION ORAL DAILY PRN
COMMUNITY

## 2025-02-13 RX ORDER — POLOXAMER 188/SORBITOL/UREA
CLEANSER (ML) TOPICAL
COMMUNITY
Start: 2021-01-01

## 2025-02-13 RX ORDER — FOLIC ACID 1 MG/1
TABLET ORAL
COMMUNITY
Start: 2025-02-11

## 2025-02-13 RX ORDER — FENTANYL 50 UG/1
1 PATCH TRANSDERMAL
Qty: 10 PATCH | Refills: 0 | Status: SHIPPED | OUTPATIENT
Start: 2025-02-13 | End: 2025-03-15

## 2025-02-13 NOTE — PATIENT INSTRUCTIONS
Continue current plan of care for acute and chronic conditions per primary and specialist teams  Start oxycodone IR 10mg every 4 hours as needed for uncontrolled cancer related pain and cancer related bone pain  Start Fentanyl patch 50 mcg/hr every 72 hours scheduled for uncontrolled cancer related pain and cancer related bone pain  Will consider a steroid burst to help with pain if continue to be uncontrolled  Controlled substances agreement signed  PDMP reviewed  Please call the office if your symptoms worsen or if you were to develop new symptoms  Please call the palliative care office when you need refills

## 2025-02-13 NOTE — PROGRESS NOTES
Ousmane Lockett is a 59 y.o. male who presents to the palliative care clinic today for:  Chief Complaint   Patient presents with    New Patient     New patient, referral Dr Awada, Metastatic non-small cell lung cancer    Pain     Pain medications not working: Fentanyl and oxycodone 5mg    Nausea       HPI:   Ousmane Lockett present to the palliative care clinic as a new patient to establish care. Ousmane Lockett  has a past medical history of Cancer (HCC), CVA (cerebral vascular accident) (HCC), Hematoma, Hx of blood clots, Hyperlipidemia, Hypertension, Irregular heart rate, Non Hodgkin's lymphoma (HCC), NSCLC of right lung (HCC), Pneumonia, Renal transplant recipient, Sepsis due to pneumonia (HCC), Status post surgical removal of malignant neoplasm of skin, and Stroke (HCC).  Patient underwent a kidney transplant in 1995.  In February 2024 the patient presented with right shoulder pain, bone lesion was seen on imaging.  He underwent a humerus biopsy showing poorly differentiated carcinoma.  He completed radiation to the humerus.  PET/CT scan showing right lower lung nodule with no other lesions.  On July 3, 2020 patient underwent a right robotic assisted right lower lobectomy, right upper lobe wedge resection.  Pathology positive for adenosquamous carcinoma, minimal invasive adenocarcinoma.  Patient began treatment with pembrolizumab.  This was adjusted on November 27, 2024 with adding carboplatin and pemetrexed due to questionable pseudo progression and new lesions in the liver.  In December the medications were reduced given neutropenia.  PET/CT performed on January 3, 2025, showing similar liver disease when compared to CT abdomen/pelvis on November 19, 2024.  A liver biopsy was completed on January 16, 2025, pathology consistent for metastatic carcinoma favoring adenosquamous.  On February 7, 2025 he had a an MRI of the thoracic spine showing diffuse osseous metastatic disease involving the

## 2025-02-17 ENCOUNTER — TELEPHONE (OUTPATIENT)
Dept: PALLATIVE CARE | Age: 60
End: 2025-02-17

## 2025-02-17 ENCOUNTER — LAB (OUTPATIENT)
Dept: LAB | Age: 60
End: 2025-02-17

## 2025-02-17 ENCOUNTER — HOSPITAL ENCOUNTER (OUTPATIENT)
Dept: RADIATION ONCOLOGY | Age: 60
Discharge: HOME | End: 2025-02-17
Payer: COMMERCIAL

## 2025-02-17 VITALS
WEIGHT: 187 LBS | SYSTOLIC BLOOD PRESSURE: 117 MMHG | TEMPERATURE: 98.3 F | BODY MASS INDEX: 27.7 KG/M2 | RESPIRATION RATE: 18 BRPM | OXYGEN SATURATION: 92 % | HEIGHT: 69 IN | HEART RATE: 101 BPM | DIASTOLIC BLOOD PRESSURE: 58 MMHG

## 2025-02-17 DIAGNOSIS — C79.51 METASTATIC CARCINOMA INVOLVING BONE WITH UNKNOWN PRIMARY SITE (HCC): ICD-10-CM

## 2025-02-17 DIAGNOSIS — C80.1 METASTATIC CARCINOMA INVOLVING BONE WITH UNKNOWN PRIMARY SITE (HCC): ICD-10-CM

## 2025-02-17 DIAGNOSIS — G89.3 CANCER RELATED PAIN: ICD-10-CM

## 2025-02-17 DIAGNOSIS — C34.90 METASTATIC NON-SMALL CELL LUNG CANCER (HCC): Primary | ICD-10-CM

## 2025-02-17 DIAGNOSIS — Z51.5 PALLIATIVE CARE PATIENT: ICD-10-CM

## 2025-02-17 LAB
ANION GAP SERPL CALC-SCNC: 16 MEQ/L (ref 8–16)
BUN SERPL-MCNC: 12 MG/DL (ref 7–22)
CHLORIDE SERPL-SCNC: 98 MEQ/L (ref 98–111)
CO2 SERPL-SCNC: 26 MEQ/L (ref 23–33)
CREAT SERPL-MCNC: 0.6 MG/DL (ref 0.4–1.2)
FOUNDATION MEDICINE BLOOD TUMOR MUTATIONAL BURDEN: NORMAL
FOUNDATION MEDICINE CTDNA TUMOR FRACTION: NORMAL
FOUNDATION MEDICINE RESULT STATUS: NORMAL
GFR SERPL CREATININE-BSD FRML MDRD: > 90 ML/MIN/1.73M2
MSI CA SPEC-IMP: NORMAL
POTASSIUM SERPL-SCNC: 4.8 MEQ/L (ref 3.5–5.2)
SODIUM SERPL-SCNC: 140 MEQ/L (ref 135–145)

## 2025-02-17 PROCEDURE — 99212 OFFICE O/P EST SF 10 MIN: CPT | Performed by: RADIOLOGY

## 2025-02-17 RX ORDER — FENTANYL 75 UG/1
1 PATCH TRANSDERMAL
Qty: 10 PATCH | Refills: 0 | Status: SHIPPED | OUTPATIENT
Start: 2025-02-17 | End: 2025-03-19

## 2025-02-17 ASSESSMENT — ENCOUNTER SYMPTOMS
TROUBLE SWALLOWING: 0
COUGH: 1
ABDOMINAL PAIN: 0
BLOOD IN STOOL: 0
SHORTNESS OF BREATH: 1
BACK PAIN: 1
NAUSEA: 0

## 2025-02-17 ASSESSMENT — PAIN SCALES - GENERAL: PAINLEVEL_OUTOF10: 7

## 2025-02-17 ASSESSMENT — PAIN DESCRIPTION - ORIENTATION: ORIENTATION: RIGHT;MID

## 2025-02-17 ASSESSMENT — PAIN DESCRIPTION - LOCATION: LOCATION: BACK

## 2025-02-17 NOTE — TELEPHONE ENCOUNTER
Lets go to 75mcg/hr with the fentanyl patch, [place] in to [place] pharmacy. If these increases dont help may need to go to long acting oral medication

## 2025-02-17 NOTE — PROGRESS NOTES
[facility]           [address], [address]        [place], [place] [address]        O: [phone]        F: [phone]       [email or web address]            FOLLOW UP NOTE    Date of Service: 2025  Patient ID: Ousmane Lockett   : 1965  MRN: [id number]   Acct Number: [id number]       DATE OF SERVICE: 2025   LOCATION: [facility]  PROVIDER: Bull Soler MD MS    FOLLOW UP PHYSICIANS: Dr. Hassan Awada ([facility])  Dr. Quinn Lovelace ([facility]), Dr. Wong ([facility])    ASSESSMENT AND PLAN:   Cancer Staging   No matching staging information was found for the patient.      ***    {RTCMTH:56482}    HPI:  {HPIONCHX:30505}    INTERVAL HISTORY:  Ousmane Lockett is a 59 y.o. male is presenting today for regularly scheduled follow up regarding the above mentioned oncologic history.    Review of Systems   Constitutional:  Positive for activity change and fatigue. Negative for appetite change and unexpected weight change.   HENT:  Negative for ear pain and trouble swallowing.    Eyes:  Negative for visual disturbance.   Respiratory:  Positive for cough and shortness of breath.    Cardiovascular:  Positive for chest pain. Negative for leg swelling.   Gastrointestinal:  Negative for abdominal pain, blood in stool and nausea.   Musculoskeletal:  Positive for back pain and neck pain. Negative for myalgias.   Neurological:  Positive for weakness. Negative for dizziness, numbness and headaches.   Psychiatric/Behavioral:  Negative for confusion.        CHAPERONE: ***    PAIN: 10    LABORATORY STUDIES:  Onc labs:   Lab Results   Component Value Date/Time    PSA 0.66 2020 07:35 AM     2025 10:00 AM       MEDICATIONS:   Current Outpatient Medications   Medication Sig Dispense Refill    acetaminophen (TYLENOL) 500 MG CAPS capsule Take 1-2 capsules by mouth every 6 hours as needed      dexAMETHasone (DECADRON) 4 MG tablet

## 2025-02-17 NOTE — TELEPHONE ENCOUNTER
Pt called states that the Fentanyl 50 mcg and oxycodone 10 mg 1 tab every 4 hours are not giving him any relief. Pt asking if he can go up in his medications.

## 2025-02-19 LAB — SIROLIMUS BLD-MCNC: 4 NG/ML

## 2025-02-21 ENCOUNTER — TELEPHONE (OUTPATIENT)
Dept: PALLATIVE CARE | Age: 60
End: 2025-02-21

## 2025-02-21 NOTE — TELEPHONE ENCOUNTER
Spoke with pt, informed pt of message per Yomi JOLLY. Pt verbalized understanding. Encourage pt to call the office on Monday. Pt verbalized understanding.

## 2025-02-21 NOTE — TELEPHONE ENCOUNTER
Have him increase his oxycodone to 15mg (1.5 tablets every 4 hours). If this still does not help,  may take 2 tablets every 4 hours (20mg). Please check in Monday and let us know how the pain is, may need to transition to methadone instead of fentanyl patch if this does not help

## 2025-02-21 NOTE — TELEPHONE ENCOUNTER
Patient called states that his pain is not any better. Pt is doing the 75 mcg fentanyl patch and oxycodone 10 mg 1 every 4 hours. That is not helping.     Pt states he does not want to go into the weekend in pain and that he just had chemo this week and after chemo he always feels terrible.     [place] pharmacy verified.

## 2025-02-25 NOTE — PATIENT INSTRUCTIONS
If your physician has ordered procedures/ testing and you have not been contacted with in 2 days after your office visit,  please call our office.     If you have had your testing performed -  please allow 48 hours for our office to notify you of the results.  If you have not heard from us with in the 48 hrs,  please call the office.     Results for the 14 day and 30 day heart monitor - please allow 7-10 business days after the monitor is mailed back.    You may receive a survey regarding the care you received during your visit.  Your input is valuable to us.  We encourage you to complete and return your survey.  We hope you will choose us in the future for your healthcare needs.  Thank you for choosing [facility]!    Your Medical Assistant Today:  Tricia JORDAN     Your RN Today:  Hilda JORDAN   Your Provider for Today: Dr. Tejeda  Ph. [phone] opt 1

## 2025-02-26 ENCOUNTER — HOSPITAL ENCOUNTER (OUTPATIENT)
Dept: INFUSION THERAPY | Age: 60
Discharge: HOME | End: 2025-02-26

## 2025-02-26 ENCOUNTER — SCHEDULED TELEPHONE ENCOUNTER (OUTPATIENT)
Dept: ONCOLOGY | Age: 60
End: 2025-02-26
Payer: COMMERCIAL

## 2025-02-26 ENCOUNTER — CASE MANAGEMENT (OUTPATIENT)
Dept: CASE MANAGEMENT | Age: 60
End: 2025-02-26

## 2025-02-26 ENCOUNTER — OFFICE VISIT (OUTPATIENT)
Dept: CARDIOLOGY CLINIC | Age: 60
End: 2025-02-26

## 2025-02-26 VITALS
SYSTOLIC BLOOD PRESSURE: 132 MMHG | BODY MASS INDEX: 28.79 KG/M2 | HEIGHT: 69 IN | DIASTOLIC BLOOD PRESSURE: 75 MMHG | HEART RATE: 94 BPM | OXYGEN SATURATION: 100 % | WEIGHT: 194.4 LBS

## 2025-02-26 DIAGNOSIS — Z94.0 HISTORY OF RENAL TRANSPLANT: ICD-10-CM

## 2025-02-26 DIAGNOSIS — C34.00 MALIGNANT NEOPLASM OF HILUS OF LUNG, UNSPECIFIED LATERALITY (HCC): Primary | ICD-10-CM

## 2025-02-26 DIAGNOSIS — I48.0 PAROXYSMAL ATRIAL FIBRILLATION (HCC): ICD-10-CM

## 2025-02-26 DIAGNOSIS — R06.02 SHORTNESS OF BREATH: ICD-10-CM

## 2025-02-26 DIAGNOSIS — Z94.81 HISTORY OF BONE MARROW TRANSPLANT (HCC): ICD-10-CM

## 2025-02-26 DIAGNOSIS — Z85.820 HISTORY OF MELANOMA: ICD-10-CM

## 2025-02-26 DIAGNOSIS — Z85.72 HISTORY OF NON-HODGKIN LYMPHOMA: ICD-10-CM

## 2025-02-26 DIAGNOSIS — C34.90 METASTATIC NON-SMALL CELL LUNG CANCER (HCC): Primary | ICD-10-CM

## 2025-02-26 PROCEDURE — 99212 OFFICE O/P EST SF 10 MIN: CPT | Performed by: INTERNAL MEDICINE

## 2025-02-26 NOTE — PATIENT INSTRUCTIONS
-Ordered chemotherapy carboplatin/pemetrexed/pembrolizumab.  -Please obtain insurance authorization.  We will also reach out to [facility] to cancer therapy active treatment plan.  -Patient to  the dental clearance form on Friday, 2/28/2025.  Please provided to him.  -Plan to have the patient return to clinic on 3/14/2025 for follow-up and treatment

## 2025-02-26 NOTE — PROGRESS NOTES
-13.1 %    LA Diameter 3.5 cm    RVIDd 2.4 cm    Fractional Shortening 2D 30 28 - 44 %    LVIDd Index 2.24 cm/m2    LVIDs Index 1.57 cm/m2    LV RWT Ratio 0.43     LV Mass 2D 164.5 88 - 224 g    LV Mass 2D Index 78.3 49 - 115 g/m2    LA Size Index 1.67 cm/m2    EF BP 56 55 - 100 %    Narrative      Left Ventricle: Normal left ventricular systolic function with a   visually estimated EF of 55 - 60%. Left ventricle size is normal. Normal   wall thickness. mildly abnormal strain pattern with average GLS -13%   Normal wall motion.    Image quality is adequate.         Stress Cinthya: No results found for this or any previous visit.    Cardiac Monitor: No results found for this or any previous visit.   Results for orders placed or performed during the hospital encounter of 11/30/24   Cardiac event/MCOT monitor    Narrative    Normal sinus rhythm   Episode of A fib noted followed by a 3 sec pause while converting to sinus   rhythm   Short episode of wide complex tachycardia possible non sustained v tach   No sustained arrhythmias       No results found for this or any previous visit. No results found for this or any previous visit.    Cath: No results found for this or any previous visit.       **This report has been created using voice recognition software. It may contain minor errors which are inherent in voice recognition technology.**      Barry Tejeda MD2/26/20254:09 PM

## 2025-02-26 NOTE — PROGRESS NOTES
scheduled with thoracic surgery 6/18.  Surgery happened 7/3. This pathology was reported 7/17.  There needed to be 4 weeks of recovery after this lung surgery.  Shoulder surgery 8/6/2024.     Rather than CUP, his disease is consistent with metastatic lung cancer involving shoulder humerus, lung nodule/mass and chest LN based on his surgery. Surgery path was finalized 7/18/2024.  PDL1 is 100%.      Three-four weeks after the shoulder surgery, we will start systemic therapy for cancer, this will depend on the genetic testing, most likely single agent immunotherapy for PD1.     We discussed Tempus XO IO monitoring, to potentially enable us to monitor disease and perhaps stop therapy earlier than 24 months.     Care coordinated with Nephrology who will adjust his transplant medications to provide better protection for his kidney.     Tentatively planning to start 1st dose of immunotherapy in 2-3 weeks.     After 1 dose he will return to clinic with repeat blood work and allo screen blood work.\"     Nancy Wong MD, PhD     10/9/2024 Surgery    Pleural biopsy, Dr. Marshall  Pathologic Diagnosis    A. Pleura, biopsy:   Mixed lymphoid infiltrate and dense/atypical histiocytic and mesothelial proliferation (See comment)   No carcinoma or melanoma seen         Electronically signed by Rene Arias MD, PhD on 10/15/2024 at  3:49 PM        1/3/2025 Imaging    PET/CT    FINDINGS:     Neck: No FDG avid cervical lymphadenopathy.     Chest: Cardiac size is at the upper limits of normal. Atherosclerotic  calcifications are present in the thoracic aorta and coronary arteries without  evidence of aneurysm. There is no pericardial effusion. There is a small  right-sided pleural effusion. Surgical changes are noted in the right lung.  There is scarring throughout the right midlung. There is a calcified granuloma  in the right midlung. A 1.2 cm spiculated nodule at the medial right upper lung  has a maximum SUV of 2.9 (image

## 2025-02-28 ENCOUNTER — HOSPITAL ENCOUNTER (OUTPATIENT)
Dept: CT IMAGING | Age: 60
Discharge: HOME | End: 2025-02-28

## 2025-02-28 ENCOUNTER — HOSPITAL ENCOUNTER (OUTPATIENT)
Dept: RADIATION ONCOLOGY | Age: 60
Discharge: HOME | End: 2025-02-28
Payer: COMMERCIAL

## 2025-02-28 DIAGNOSIS — C79.52 SECONDARY MALIGNANT NEOPLASM OF BONE AND BONE MARROW (HCC): ICD-10-CM

## 2025-02-28 DIAGNOSIS — C79.51 SECONDARY MALIGNANT NEOPLASM OF BONE AND BONE MARROW (HCC): ICD-10-CM

## 2025-02-28 PROCEDURE — 77334 RADIATION TREATMENT AID(S): CPT | Performed by: RADIOLOGY

## 2025-02-28 PROCEDURE — 77332 RADIATION TREATMENT AID(S): CPT | Performed by: RADIOLOGY

## 2025-02-28 PROCEDURE — 3209999900 CT GUIDE RADIATION THERAPY NO CHARGE

## 2025-03-01 ENCOUNTER — LAB (OUTPATIENT)
Dept: LAB | Age: 60
End: 2025-03-01

## 2025-03-01 LAB
ANION GAP SERPL CALC-SCNC: 13 MEQ/L (ref 8–16)
BUN SERPL-MCNC: 9 MG/DL (ref 8–23)
CHLORIDE SERPL-SCNC: 95 MEQ/L (ref 98–111)
CO2 SERPL-SCNC: 26 MEQ/L (ref 22–29)
CREAT SERPL-MCNC: 0.7 MG/DL (ref 0.7–1.2)
GFR SERPL CREATININE-BSD FRML MDRD: > 90 ML/MIN/1.73M2
POTASSIUM SERPL-SCNC: 4.6 MEQ/L (ref 3.5–5.2)
SODIUM SERPL-SCNC: 134 MEQ/L (ref 135–145)

## 2025-03-03 ENCOUNTER — CASE MANAGEMENT (OUTPATIENT)
Dept: CASE MANAGEMENT | Age: 60
End: 2025-03-03

## 2025-03-03 ENCOUNTER — APPOINTMENT (OUTPATIENT)
Dept: INTERVENTIONAL RADIOLOGY/VASCULAR | Age: 60
DRG: 187 | End: 2025-03-03
Attending: NURSE PRACTITIONER
Payer: COMMERCIAL

## 2025-03-03 ENCOUNTER — HOSPITAL ENCOUNTER (INPATIENT)
Age: 60
LOS: 2 days | Discharge: HOME | DRG: 187 | End: 2025-03-05
Payer: COMMERCIAL

## 2025-03-03 ENCOUNTER — TELEPHONE (OUTPATIENT)
Dept: CARDIOLOGY CLINIC | Age: 60
End: 2025-03-03

## 2025-03-03 ENCOUNTER — APPOINTMENT (OUTPATIENT)
Dept: CT IMAGING | Age: 60
DRG: 187 | End: 2025-03-03
Payer: COMMERCIAL

## 2025-03-03 DIAGNOSIS — C34.90 METASTATIC NON-SMALL CELL LUNG CANCER (HCC): ICD-10-CM

## 2025-03-03 DIAGNOSIS — D64.9 ANEMIA, UNSPECIFIED TYPE: ICD-10-CM

## 2025-03-03 DIAGNOSIS — R60.0 PERIPHERAL EDEMA: ICD-10-CM

## 2025-03-03 DIAGNOSIS — Z94.0 HISTORY OF RENAL TRANSPLANT: ICD-10-CM

## 2025-03-03 DIAGNOSIS — R06.02 SHORTNESS OF BREATH: ICD-10-CM

## 2025-03-03 DIAGNOSIS — J90 LOCULATED PLEURAL EFFUSION: Primary | ICD-10-CM

## 2025-03-03 PROBLEM — R06.00 DYSPNEA: Status: ACTIVE | Noted: 2025-03-03

## 2025-03-03 LAB
ANION GAP SERPL CALC-SCNC: 9 MEQ/L (ref 8–16)
ANISOCYTOSIS BLD QL SMEAR: PRESENT
APTT PPP: 30.5 SECONDS (ref 22–38)
BASOPHILS ABSOLUTE: 0 THOU/MM3 (ref 0–0.1)
BASOPHILS NFR BLD AUTO: 0.2 %
BUN SERPL-MCNC: 11 MG/DL (ref 8–23)
CALCIUM SERPL-MCNC: 9 MG/DL (ref 8.8–10.2)
CHLORIDE SERPL-SCNC: 94 MEQ/L (ref 98–111)
CO2 SERPL-SCNC: 29 MEQ/L (ref 22–29)
CREAT SERPL-MCNC: 0.6 MG/DL (ref 0.7–1.2)
DEPRECATED RDW RBC AUTO: 71.5 FL (ref 35–45)
EKG ATRIAL RATE: 94 BPM
EKG P AXIS: 46 DEGREES
EKG P-R INTERVAL: 144 MS
EKG Q-T INTERVAL: 378 MS
EKG QRS DURATION: 96 MS
EKG QTC CALCULATION (BAZETT): 472 MS
EKG R AXIS: -35 DEGREES
EKG T AXIS: 19 DEGREES
EKG VENTRICULAR RATE: 94 BPM
EOSINOPHIL NFR BLD AUTO: 0.1 %
EOSINOPHILS ABSOLUTE: 0 THOU/MM3 (ref 0–0.4)
ERYTHROCYTE [DISTWIDTH] IN BLOOD BY AUTOMATED COUNT: 22.8 % (ref 11.5–14.5)
FLUAV RNA RESP QL NAA+PROBE: NOT DETECTED
FLUBV RNA RESP QL NAA+PROBE: NOT DETECTED
GFR SERPL CREATININE-BSD FRML MDRD: > 90 ML/MIN/1.73M2
GLUCOSE SERPL-MCNC: 148 MG/DL (ref 74–109)
HCT VFR BLD AUTO: 28.2 % (ref 42–52)
HGB BLD-MCNC: 8 GM/DL (ref 14–18)
HYPOCHROMIA BLD QL SMEAR: PRESENT
IMM GRANULOCYTES # BLD AUTO: 0.42 THOU/MM3 (ref 0–0.07)
IMM GRANULOCYTES NFR BLD AUTO: 3.7 %
INR PPP: 1.96 (ref 0.85–1.13)
LYMPHOCYTES ABSOLUTE: 2 THOU/MM3 (ref 1–4.8)
LYMPHOCYTES NFR BLD AUTO: 17.5 %
MAGNESIUM SERPL-MCNC: 1.9 MG/DL (ref 1.6–2.6)
MCH RBC QN AUTO: 25 PG (ref 26–33)
MCHC RBC AUTO-ENTMCNC: 28.4 GM/DL (ref 32.2–35.5)
MCV RBC AUTO: 88.1 FL (ref 80–94)
MONOCYTES ABSOLUTE: 1.5 THOU/MM3 (ref 0.4–1.3)
MONOCYTES NFR BLD AUTO: 12.9 %
NEUTROPHILS ABSOLUTE: 7.4 THOU/MM3 (ref 1.8–7.7)
NEUTROPHILS NFR BLD AUTO: 65.6 %
NRBC BLD AUTO-RTO: 0 /100 WBC
NT-PROBNP SERPL IA-MCNC: 1220 PG/ML (ref 0–124)
OSMOLALITY SERPL CALC.SUM OF ELEC: 266.7 MOSMOL/KG (ref 275–300)
PLATELET # BLD AUTO: 206 THOU/MM3 (ref 130–400)
PMV BLD AUTO: 9.9 FL (ref 9.4–12.4)
POIKILOCYTES: ABNORMAL
POLYCHROMASIA BLD QL SMEAR: ABNORMAL
POTASSIUM SERPL-SCNC: 5 MEQ/L (ref 3.5–5.2)
RBC # BLD AUTO: 3.2 MILL/MM3 (ref 4.7–6.1)
SARS-COV-2 RNA RESP QL NAA+PROBE: NOT DETECTED
SCAN OF BLOOD SMEAR: NORMAL
SODIUM SERPL-SCNC: 132 MEQ/L (ref 135–145)
TROPONIN, HIGH SENSITIVITY: 31 NG/L (ref 0–12)
TROPONIN, HIGH SENSITIVITY: 32 NG/L (ref 0–12)
WBC # BLD AUTO: 11.3 THOU/MM3 (ref 4.8–10.8)

## 2025-03-03 PROCEDURE — 93005 ELECTROCARDIOGRAM TRACING: CPT | Performed by: NURSE PRACTITIONER

## 2025-03-03 PROCEDURE — 71275 CT ANGIOGRAPHY CHEST: CPT

## 2025-03-03 PROCEDURE — 83735 ASSAY OF MAGNESIUM: CPT

## 2025-03-03 PROCEDURE — 96374 THER/PROPH/DIAG INJ IV PUSH: CPT

## 2025-03-03 PROCEDURE — 99285 EMERGENCY DEPT VISIT HI MDM: CPT

## 2025-03-03 PROCEDURE — 87636 SARSCOV2 & INF A&B AMP PRB: CPT

## 2025-03-03 PROCEDURE — 96375 TX/PRO/DX INJ NEW DRUG ADDON: CPT

## 2025-03-03 PROCEDURE — 85730 THROMBOPLASTIN TIME PARTIAL: CPT

## 2025-03-03 PROCEDURE — 6370000000 HC RX 637 (ALT 250 FOR IP)

## 2025-03-03 PROCEDURE — 82010 KETONE BODYS QUAN: CPT

## 2025-03-03 PROCEDURE — 6360000004 HC RX CONTRAST MEDICATION: Performed by: NURSE PRACTITIONER

## 2025-03-03 PROCEDURE — 2060000000 HC ICU INTERMEDIATE R&B

## 2025-03-03 PROCEDURE — 36415 COLL VENOUS BLD VENIPUNCTURE: CPT

## 2025-03-03 PROCEDURE — 83880 ASSAY OF NATRIURETIC PEPTIDE: CPT

## 2025-03-03 PROCEDURE — 2500000003 HC RX 250 WO HCPCS

## 2025-03-03 PROCEDURE — 80048 BASIC METABOLIC PNL TOTAL CA: CPT

## 2025-03-03 PROCEDURE — 6360000002 HC RX W HCPCS: Performed by: NURSE PRACTITIONER

## 2025-03-03 PROCEDURE — 85025 COMPLETE CBC W/AUTO DIFF WBC: CPT

## 2025-03-03 PROCEDURE — 85610 PROTHROMBIN TIME: CPT

## 2025-03-03 PROCEDURE — 93970 EXTREMITY STUDY: CPT

## 2025-03-03 PROCEDURE — 84484 ASSAY OF TROPONIN QUANT: CPT

## 2025-03-03 RX ORDER — SIROLIMUS 1 MG/1
1 TABLET, FILM COATED ORAL DAILY
Status: DISCONTINUED | OUTPATIENT
Start: 2025-03-04 | End: 2025-03-05 | Stop reason: HOSPADM

## 2025-03-03 RX ORDER — POTASSIUM CHLORIDE 7.45 MG/ML
10 INJECTION INTRAVENOUS PRN
Status: DISCONTINUED | OUTPATIENT
Start: 2025-03-03 | End: 2025-03-05 | Stop reason: HOSPADM

## 2025-03-03 RX ORDER — PROCHLORPERAZINE EDISYLATE 5 MG/ML
10 INJECTION INTRAMUSCULAR; INTRAVENOUS ONCE
Status: COMPLETED | OUTPATIENT
Start: 2025-03-03 | End: 2025-03-03

## 2025-03-03 RX ORDER — SODIUM CHLORIDE 0.9 % (FLUSH) 0.9 %
5-40 SYRINGE (ML) INJECTION EVERY 12 HOURS SCHEDULED
Status: DISCONTINUED | OUTPATIENT
Start: 2025-03-03 | End: 2025-03-05 | Stop reason: HOSPADM

## 2025-03-03 RX ORDER — FOLIC ACID 1 MG/1
1 TABLET ORAL DAILY
Status: DISCONTINUED | OUTPATIENT
Start: 2025-03-04 | End: 2025-03-05 | Stop reason: HOSPADM

## 2025-03-03 RX ORDER — FUROSEMIDE 40 MG/1
20 TABLET ORAL DAILY
Status: DISCONTINUED | OUTPATIENT
Start: 2025-03-04 | End: 2025-03-05 | Stop reason: HOSPADM

## 2025-03-03 RX ORDER — FENTANYL 75 UG/1
1 PATCH TRANSDERMAL
Status: DISCONTINUED | OUTPATIENT
Start: 2025-03-06 | End: 2025-03-05 | Stop reason: HOSPADM

## 2025-03-03 RX ORDER — MULTIVITAMIN WITH IRON
1 TABLET ORAL DAILY
Status: DISCONTINUED | OUTPATIENT
Start: 2025-03-04 | End: 2025-03-05 | Stop reason: HOSPADM

## 2025-03-03 RX ORDER — METOPROLOL TARTRATE 50 MG
50 TABLET ORAL 2 TIMES DAILY
Status: DISCONTINUED | OUTPATIENT
Start: 2025-03-03 | End: 2025-03-05 | Stop reason: HOSPADM

## 2025-03-03 RX ORDER — PANTOPRAZOLE SODIUM 40 MG/1
40 TABLET, DELAYED RELEASE ORAL
Status: DISCONTINUED | OUTPATIENT
Start: 2025-03-04 | End: 2025-03-05 | Stop reason: HOSPADM

## 2025-03-03 RX ORDER — SODIUM CHLORIDE 0.9 % (FLUSH) 0.9 %
5-40 SYRINGE (ML) INJECTION PRN
Status: DISCONTINUED | OUTPATIENT
Start: 2025-03-03 | End: 2025-03-05 | Stop reason: HOSPADM

## 2025-03-03 RX ORDER — POLYETHYLENE GLYCOL 3350 17 G/17G
17 POWDER, FOR SOLUTION ORAL DAILY PRN
Status: DISCONTINUED | OUTPATIENT
Start: 2025-03-03 | End: 2025-03-03 | Stop reason: SDUPTHER

## 2025-03-03 RX ORDER — PROCHLORPERAZINE MALEATE 10 MG
10 TABLET ORAL EVERY 12 HOURS PRN
Status: DISCONTINUED | OUTPATIENT
Start: 2025-03-03 | End: 2025-03-05 | Stop reason: HOSPADM

## 2025-03-03 RX ORDER — POLYETHYLENE GLYCOL 3350 17 G/17G
17 POWDER, FOR SOLUTION ORAL DAILY PRN
Status: DISCONTINUED | OUTPATIENT
Start: 2025-03-03 | End: 2025-03-05 | Stop reason: HOSPADM

## 2025-03-03 RX ORDER — OXYCODONE HYDROCHLORIDE 5 MG/1
20 TABLET ORAL EVERY 4 HOURS PRN
Status: DISCONTINUED | OUTPATIENT
Start: 2025-03-03 | End: 2025-03-05 | Stop reason: HOSPADM

## 2025-03-03 RX ORDER — MAGNESIUM SULFATE IN WATER 40 MG/ML
2000 INJECTION, SOLUTION INTRAVENOUS PRN
Status: DISCONTINUED | OUTPATIENT
Start: 2025-03-03 | End: 2025-03-05 | Stop reason: HOSPADM

## 2025-03-03 RX ORDER — IOPAMIDOL 755 MG/ML
80 INJECTION, SOLUTION INTRAVASCULAR
Status: COMPLETED | OUTPATIENT
Start: 2025-03-03 | End: 2025-03-03

## 2025-03-03 RX ORDER — ONDANSETRON 2 MG/ML
4 INJECTION INTRAMUSCULAR; INTRAVENOUS EVERY 6 HOURS PRN
Status: DISCONTINUED | OUTPATIENT
Start: 2025-03-03 | End: 2025-03-05 | Stop reason: HOSPADM

## 2025-03-03 RX ORDER — SODIUM CHLORIDE 9 MG/ML
INJECTION, SOLUTION INTRAVENOUS PRN
Status: DISCONTINUED | OUTPATIENT
Start: 2025-03-03 | End: 2025-03-05 | Stop reason: HOSPADM

## 2025-03-03 RX ORDER — ASCORBIC ACID 500 MG
1000 TABLET ORAL DAILY
Status: DISCONTINUED | OUTPATIENT
Start: 2025-03-04 | End: 2025-03-05 | Stop reason: HOSPADM

## 2025-03-03 RX ORDER — PREDNISONE 10 MG/1
10 TABLET ORAL DAILY
Status: DISCONTINUED | OUTPATIENT
Start: 2025-03-04 | End: 2025-03-05 | Stop reason: HOSPADM

## 2025-03-03 RX ORDER — POTASSIUM CHLORIDE 1500 MG/1
40 TABLET, EXTENDED RELEASE ORAL PRN
Status: DISCONTINUED | OUTPATIENT
Start: 2025-03-03 | End: 2025-03-05 | Stop reason: HOSPADM

## 2025-03-03 RX ORDER — ACETAMINOPHEN 500 MG
500 TABLET ORAL EVERY 6 HOURS PRN
Status: DISCONTINUED | OUTPATIENT
Start: 2025-03-03 | End: 2025-03-05 | Stop reason: HOSPADM

## 2025-03-03 RX ADMIN — PROCHLORPERAZINE EDISYLATE 10 MG: 5 INJECTION INTRAMUSCULAR; INTRAVENOUS at 20:44

## 2025-03-03 RX ADMIN — METOPROLOL TARTRATE 50 MG: 50 TABLET, FILM COATED ORAL at 23:16

## 2025-03-03 RX ADMIN — APIXABAN 5 MG: 5 TABLET, FILM COATED ORAL at 23:16

## 2025-03-03 RX ADMIN — SODIUM CHLORIDE, PRESERVATIVE FREE 10 ML: 5 INJECTION INTRAVENOUS at 23:17

## 2025-03-03 RX ADMIN — IOPAMIDOL 80 ML: 755 INJECTION, SOLUTION INTRAVENOUS at 18:37

## 2025-03-03 RX ADMIN — HYDROMORPHONE HYDROCHLORIDE 1 MG: 1 INJECTION, SOLUTION INTRAMUSCULAR; INTRAVENOUS; SUBCUTANEOUS at 20:53

## 2025-03-03 ASSESSMENT — PAIN - FUNCTIONAL ASSESSMENT
PAIN_FUNCTIONAL_ASSESSMENT: 0-10
PAIN_FUNCTIONAL_ASSESSMENT: 0-10
PAIN_FUNCTIONAL_ASSESSMENT: NONE - DENIES PAIN
PAIN_FUNCTIONAL_ASSESSMENT: NONE - DENIES PAIN
PAIN_FUNCTIONAL_ASSESSMENT: 0-10
PAIN_FUNCTIONAL_ASSESSMENT: PREVENTS OR INTERFERES SOME ACTIVE ACTIVITIES AND ADLS

## 2025-03-03 ASSESSMENT — PAIN DESCRIPTION - ORIENTATION
ORIENTATION: RIGHT
ORIENTATION: RIGHT;MID
ORIENTATION: LOWER

## 2025-03-03 ASSESSMENT — PAIN DESCRIPTION - PAIN TYPE
TYPE: CHRONIC PAIN
TYPE: ACUTE PAIN

## 2025-03-03 ASSESSMENT — PAIN DESCRIPTION - DESCRIPTORS
DESCRIPTORS: DULL
DESCRIPTORS: SORE

## 2025-03-03 ASSESSMENT — PAIN DESCRIPTION - ONSET
ONSET: ON-GOING
ONSET: ON-GOING

## 2025-03-03 ASSESSMENT — PAIN DESCRIPTION - FREQUENCY
FREQUENCY: CONTINUOUS
FREQUENCY: CONTINUOUS

## 2025-03-03 ASSESSMENT — PAIN SCALES - GENERAL
PAINLEVEL_OUTOF10: 6
PAINLEVEL_OUTOF10: 6
PAINLEVEL_OUTOF10: 4
PAINLEVEL_OUTOF10: 3
PAINLEVEL_OUTOF10: 6

## 2025-03-03 ASSESSMENT — LIFESTYLE VARIABLES
HOW OFTEN DO YOU HAVE A DRINK CONTAINING ALCOHOL: NEVER
HOW MANY STANDARD DRINKS CONTAINING ALCOHOL DO YOU HAVE ON A TYPICAL DAY: PATIENT DOES NOT DRINK

## 2025-03-03 ASSESSMENT — PAIN DESCRIPTION - LOCATION
LOCATION: ABDOMEN
LOCATION: BACK
LOCATION: ABDOMEN

## 2025-03-03 NOTE — PROGRESS NOTES
Name: Ousmane Lockett  : 1965  MRN: [id number]    Oncology Navigation    Contact Type:  Telephone    Notes: ifeanyi received call from pt- 1+ pitting edema in legs & arms/along with increase SOB. Didn't know who to call. Ifeanyi instructed to call cardiologists or PCP. If unable to get in/go to ED -voiced understanding.     Electronically signed by Veronica Portillo RN on 3/3/2025 at 4:29 PM

## 2025-03-03 NOTE — ED TRIAGE NOTES
Pt presents to the ER for bilateral leg swelling and shortness of breath. Pt has lung cancer that has moved to his spine. Pt states that the swelling in his legs started 6 days ago and has been getting worse. EKG completed.

## 2025-03-03 NOTE — TELEPHONE ENCOUNTER
Pt called in stating he is having leg swelling and redness, also have increase in shortness of breath.   Pulse ox is 94-95%.  He doesn't feel like he needs to go to ER.   Appt scheduled for 3/4 with Catracho.   He is aware if his symptoms get any worse he needs to go to ER and he verbalized understanding.

## 2025-03-04 ENCOUNTER — APPOINTMENT (OUTPATIENT)
Dept: GENERAL RADIOLOGY | Age: 60
DRG: 187 | End: 2025-03-04
Payer: COMMERCIAL

## 2025-03-04 ENCOUNTER — APPOINTMENT (OUTPATIENT)
Age: 60
DRG: 187 | End: 2025-03-04
Payer: COMMERCIAL

## 2025-03-04 PROBLEM — R60.0 PERIPHERAL EDEMA: Status: ACTIVE | Noted: 2025-03-04

## 2025-03-04 LAB
ALBUMIN SERPL BCG-MCNC: 2.7 G/DL (ref 3.4–4.9)
ALP SERPL-CCNC: 179 U/L (ref 40–129)
ALT SERPL W/O P-5'-P-CCNC: 26 U/L (ref 10–50)
ANION GAP SERPL CALC-SCNC: 9 MEQ/L (ref 8–16)
AST SERPL-CCNC: 28 U/L (ref 10–50)
B-OH-BUTYR SERPL-MSCNC: 2.12 MG/DL (ref 0.2–2.81)
BILIRUB CONJ SERPL-MCNC: 0.2 MG/DL (ref 0–0.2)
BILIRUB SERPL-MCNC: 0.3 MG/DL (ref 0.3–1.2)
BUN SERPL-MCNC: 10 MG/DL (ref 8–23)
CALCIUM SERPL-MCNC: 8.5 MG/DL (ref 8.8–10.2)
CHLORIDE SERPL-SCNC: 95 MEQ/L (ref 98–111)
CO2 SERPL-SCNC: 27 MEQ/L (ref 22–29)
CREAT SERPL-MCNC: 0.6 MG/DL (ref 0.7–1.2)
DEPRECATED RDW RBC AUTO: 70.4 FL (ref 35–45)
EKG ATRIAL RATE: 96 BPM
EKG P AXIS: 41 DEGREES
EKG P-R INTERVAL: 142 MS
EKG Q-T INTERVAL: 376 MS
EKG QRS DURATION: 104 MS
EKG QTC CALCULATION (BAZETT): 475 MS
EKG R AXIS: -44 DEGREES
EKG T AXIS: 3 DEGREES
EKG VENTRICULAR RATE: 96 BPM
ERYTHROCYTE [DISTWIDTH] IN BLOOD BY AUTOMATED COUNT: 22.7 % (ref 11.5–14.5)
GFR SERPL CREATININE-BSD FRML MDRD: > 90 ML/MIN/1.73M2
GLUCOSE SERPL-MCNC: 107 MG/DL (ref 74–109)
HCT VFR BLD AUTO: 25.4 % (ref 42–52)
HGB BLD-MCNC: 7.2 GM/DL (ref 14–18)
MAGNESIUM SERPL-MCNC: 1.9 MG/DL (ref 1.6–2.6)
MCH RBC QN AUTO: 25 PG (ref 26–33)
MCHC RBC AUTO-ENTMCNC: 28.3 GM/DL (ref 32.2–35.5)
MCV RBC AUTO: 88.2 FL (ref 80–94)
OSMOLALITY SERPL CALC.SUM OF ELEC: 262.2 MOSMOL/KG (ref 275–300)
OSMOLALITY SERPL: 271 MOSMOL/KG (ref 275–295)
PLATELET # BLD AUTO: 184 THOU/MM3 (ref 130–400)
PMV BLD AUTO: 9.7 FL (ref 9.4–12.4)
POTASSIUM SERPL-SCNC: 3.8 MEQ/L (ref 3.5–5.2)
PROT SERPL-MCNC: 5.1 G/DL (ref 6.4–8.3)
RBC # BLD AUTO: 2.88 MILL/MM3 (ref 4.7–6.1)
SODIUM SERPL-SCNC: 131 MEQ/L (ref 135–145)
WBC # BLD AUTO: 8.9 THOU/MM3 (ref 4.8–10.8)

## 2025-03-04 PROCEDURE — 83735 ASSAY OF MAGNESIUM: CPT

## 2025-03-04 PROCEDURE — 2060000000 HC ICU INTERMEDIATE R&B

## 2025-03-04 PROCEDURE — 99233 SBSQ HOSP IP/OBS HIGH 50: CPT

## 2025-03-04 PROCEDURE — 36415 COLL VENOUS BLD VENIPUNCTURE: CPT

## 2025-03-04 PROCEDURE — 6360000002 HC RX W HCPCS

## 2025-03-04 PROCEDURE — 2500000003 HC RX 250 WO HCPCS

## 2025-03-04 PROCEDURE — 99223 1ST HOSP IP/OBS HIGH 75: CPT | Performed by: INTERNAL MEDICINE

## 2025-03-04 PROCEDURE — 87040 BLOOD CULTURE FOR BACTERIA: CPT

## 2025-03-04 PROCEDURE — 80076 HEPATIC FUNCTION PANEL: CPT

## 2025-03-04 PROCEDURE — 93005 ELECTROCARDIOGRAM TRACING: CPT

## 2025-03-04 PROCEDURE — 71045 X-RAY EXAM CHEST 1 VIEW: CPT

## 2025-03-04 PROCEDURE — 85027 COMPLETE CBC AUTOMATED: CPT

## 2025-03-04 PROCEDURE — 93307 TTE W/O DOPPLER COMPLETE: CPT

## 2025-03-04 PROCEDURE — 83930 ASSAY OF BLOOD OSMOLALITY: CPT

## 2025-03-04 PROCEDURE — 80048 BASIC METABOLIC PNL TOTAL CA: CPT

## 2025-03-04 PROCEDURE — 6370000000 HC RX 637 (ALT 250 FOR IP)

## 2025-03-04 RX ORDER — FUROSEMIDE 10 MG/ML
20 INJECTION INTRAMUSCULAR; INTRAVENOUS ONCE
Status: COMPLETED | OUTPATIENT
Start: 2025-03-04 | End: 2025-03-04

## 2025-03-04 RX ADMIN — APIXABAN 5 MG: 5 TABLET, FILM COATED ORAL at 10:30

## 2025-03-04 RX ADMIN — OXYCODONE 20 MG: 5 TABLET ORAL at 08:53

## 2025-03-04 RX ADMIN — METOPROLOL TARTRATE 50 MG: 50 TABLET, FILM COATED ORAL at 20:16

## 2025-03-04 RX ADMIN — APIXABAN 5 MG: 5 TABLET, FILM COATED ORAL at 20:16

## 2025-03-04 RX ADMIN — OXYCODONE 20 MG: 5 TABLET ORAL at 20:16

## 2025-03-04 RX ADMIN — CALCIUM POLYCARBOPHIL 625 MG: 625 TABLET, FILM COATED ORAL at 08:52

## 2025-03-04 RX ADMIN — SIROLIMUS 1 MG: 1 TABLET ORAL at 08:52

## 2025-03-04 RX ADMIN — FUROSEMIDE 20 MG: 10 INJECTION, SOLUTION INTRAMUSCULAR; INTRAVENOUS at 18:37

## 2025-03-04 RX ADMIN — SODIUM CHLORIDE, PRESERVATIVE FREE 10 ML: 5 INJECTION INTRAVENOUS at 20:17

## 2025-03-04 RX ADMIN — OXYCODONE 20 MG: 5 TABLET ORAL at 03:35

## 2025-03-04 RX ADMIN — FUROSEMIDE 20 MG: 40 TABLET ORAL at 08:52

## 2025-03-04 RX ADMIN — PANTOPRAZOLE SODIUM 40 MG: 40 TABLET, DELAYED RELEASE ORAL at 05:53

## 2025-03-04 RX ADMIN — OXYCODONE HYDROCHLORIDE AND ACETAMINOPHEN 1000 MG: 500 TABLET ORAL at 08:52

## 2025-03-04 RX ADMIN — Medication 1 TABLET: at 08:52

## 2025-03-04 RX ADMIN — PREDNISONE 10 MG: 10 TABLET ORAL at 08:56

## 2025-03-04 RX ADMIN — METOPROLOL TARTRATE 50 MG: 50 TABLET, FILM COATED ORAL at 08:52

## 2025-03-04 RX ADMIN — FOLIC ACID 1 MG: 1 TABLET ORAL at 08:52

## 2025-03-04 ASSESSMENT — PAIN DESCRIPTION - DESCRIPTORS
DESCRIPTORS: DULL
DESCRIPTORS: STABBING

## 2025-03-04 ASSESSMENT — PAIN DESCRIPTION - LOCATION
LOCATION: FOOT
LOCATION: BACK

## 2025-03-04 ASSESSMENT — PAIN DESCRIPTION - FREQUENCY
FREQUENCY: CONTINUOUS
FREQUENCY: CONTINUOUS

## 2025-03-04 ASSESSMENT — ENCOUNTER SYMPTOMS
COUGH: 0
CHEST TIGHTNESS: 0
STRIDOR: 0
APNEA: 0
CHOKING: 0
SHORTNESS OF BREATH: 1
WHEEZING: 0

## 2025-03-04 ASSESSMENT — PAIN DESCRIPTION - PAIN TYPE
TYPE: CHRONIC PAIN
TYPE: ACUTE PAIN;CHRONIC PAIN
TYPE: CHRONIC PAIN
TYPE: CHRONIC PAIN

## 2025-03-04 ASSESSMENT — PAIN - FUNCTIONAL ASSESSMENT
PAIN_FUNCTIONAL_ASSESSMENT: PREVENTS OR INTERFERES SOME ACTIVE ACTIVITIES AND ADLS

## 2025-03-04 ASSESSMENT — PAIN SCALES - GENERAL
PAINLEVEL_OUTOF10: 3
PAINLEVEL_OUTOF10: 8
PAINLEVEL_OUTOF10: 7
PAINLEVEL_OUTOF10: 4
PAINLEVEL_OUTOF10: 6

## 2025-03-04 ASSESSMENT — PAIN DESCRIPTION - ONSET
ONSET: ON-GOING
ONSET: ON-GOING

## 2025-03-04 ASSESSMENT — PAIN DESCRIPTION - ORIENTATION
ORIENTATION: LEFT;RIGHT
ORIENTATION: RIGHT
ORIENTATION: LOWER;RIGHT;LEFT

## 2025-03-04 NOTE — CARE COORDINATION
Case Management Assessment Initial Evaluation    Date/Time of Evaluation: 3/4/2025 10:02 AM  Assessment Completed by: Taylor Lundberg RN    If patient is discharged prior to next notation, then this note serves as note for discharge by case management.    Patient Name: Ousmane Lockett                   YOB: 1965  Diagnosis: Shortness of breath [R06.02]  Peripheral edema [R60.0]  Dyspnea [R06.00]  Loculated pleural effusion [J90]  Metastatic non-small cell lung cancer (HCC) [C34.90]                   Date / Time: 3/3/2025  4:11 PM  Location: [address] [place] [address]     Patient Admission Status: Inpatient   Readmission Risk Low 0-14, Mod 15-19), High > 20: Readmission Risk Score: 29.2    Current PCP: Daniel Barbosa MD  Health Care Decision Makers:   Primary Decision Maker: Monica Lockett - [facility] - [phone]    Additional Case Management Notes: Patient presented to the ED with SOB and BLE swelling. Imaging revealed right sided pleural effusion. Pt currently has NSCLC and has PleurX drain in place, not draining. Pulmonology consulted. PO Lasix. Strict I&O, daily weights. Blood cultures sent.     Procedures: n/a    Imaging:   3/3 CTA Chest:   1. No evidence of pulmonary artery embolism.  2. Moderate loculated right-sided pleural collection. New loculated fluid within the minor fissure.  3. Multiple small bilateral pulmonary nodules without change.  4. Metastatic disease to the skeleton with slight interval progression.  5. Metastatic disease to the liver, evaluated in very limited fashion.    3/4 CXR:   Moderate-sized loculated right pleural effusion.  Chest tube with tip at the medial right lung base. No pneumothorax is seen.    Patient Goals/Plan/Treatment Preferences: Return home w wife. Chemo @ the [facility] with plans to transfer infusions locally to  [facility] start date 3/14/25. OP PT/OT at [facility]. Has a toilet riser. Denies needs.      03/04/25 7732   Service Assessment   Patient Orientation

## 2025-03-04 NOTE — ED NOTES
Pt medicated per MAR. Leah NP in to update pt and family on POC. Call light in reach. No needs stated.   
Pt resting on cot at this time. Pt belongings placed in bag and are with family. Call light in reach.  
Pt resting on cot at this time. Pt family at bedside. No needs voiced. Call light in reach.   
Pt resting on cot at this time. Pt provided with water. Call light in reach. No needs stated. Family at bedside.  
Report received from Epi RN and Breann, Nursing Student  
Spoke with Huong on 4K prior to pt transport. Pt transport via stretcher in stable condition  
[cyclobenzaprine]    SURGICAL HISTORY       Past Surgical History:   Procedure Laterality Date    APPENDECTOMY      1972    BONE MARROW TRANSPLANT  1996    [facility] transplant once a year    CHOLECYSTECTOMY, LAPAROSCOPIC N/A 11/19/2021    CHOLECYSTECTOMY LAPAROSCOPIC performed by Abdi Carmona MD at [facility] OR    COLONOSCOPY      CT NEEDLE BIOPSY LIVER PERCUTANEOUS  1/16/2025    CT NEEDLE BIOPSY LIVER PERCUTANEOUS 1/16/2025 [facility] CT SCAN    FACIAL SURGERY N/A 10/28/2020    WIDER EXCISION MELANOMA VERTEX OF SCALP performed by Tera Perez MD at [facility] OR    GALLBLADDER SURGERY  2020    IR GUIDED PERC PLEURAL DRAIN W CATH INSERT  10/2024    KIDNEY TRANSPLANT  06/06/2000    LOBECTOMY Right 07/03/2024    @ [facility]    OTHER SURGICAL HISTORY      CAPD catheter insertion    OTHER SURGICAL HISTORY  06/09/2000    CAPD cath removal-Dr Villeda    SHOULDER SURGERY Right 08/06/2024    Reverse shoulder repair - at [facility]       PAST MEDICAL HISTORY       Past Medical History:   Diagnosis Date    Cancer (HCC)     CVA (cerebral vascular accident) (HCC)     with chemo - residual numbness in left hand    Hematoma     right leg s/p fall    Hx of blood clots     Arm    Hyperlipidemia     Hypertension     Irregular heart rate 11/30/2024    Non Hodgkin's lymphoma (HCC)     1995/96    NSCLC of right lung (HCC) 07/03/2024    Pneumonia 09/29/2024    Renal transplant recipient     [facility] transplant follows-Demetri    Sepsis due to pneumonia (HCC) 11/30/2024    Status post surgical removal of malignant neoplasm of skin 2020    Stroke (HCC) 1996           Electronically signed by Ozzie Muniz RN on 3/3/2025 at 9:37 PM

## 2025-03-04 NOTE — H&P
PRAPARE - Transportation     Lack of Transportation (Medical): No     Lack of Transportation (Non-Medical): No   Housing Stability: Low Risk  (1/12/2025)    Housing Stability Vital Sign     Unable to Pay for Housing in the Last Year: No     Number of Times Moved in the Last Year: 0     Homeless in the Last Year: No        Code status: Prior     Electronically signed by Rene Fletcher MD IM PGY-1 on 3/3/2025 at 8:39 PM.   Case was discussed with the Attending, Dr. Valenzuela.

## 2025-03-04 NOTE — PROCEDURES
PROCEDURE NOTE  Date: 3/4/2025   Name: Ousmane Lockett  YOB: 1965    Procedures  12 lead EKG completed. Results handed to Dr. Fletcher/Cassidy CORBIN.

## 2025-03-04 NOTE — CONSULTS
Negative for malignancy.  Metastatic adenosquammous adenocarcinoma with mets to rt prox humerus: S/P radical resection of bone tumor met to proximal humerus, arthroplasty to glenohumeral joint and total shoulder resection  Hx of Right lower lobe pneumonia.  Hx of  non-Hodgkin's lymphoma diagnosed in 1995.  Patient underwent chemoradiation  S/p kidney transplant on immunosuppressive therapy.    Patient found to have bilateral 2+ lower extremity edema- ?  Congestive heart failure  Follow echocardiogram  According to the interventional radiology service, the Pleurx catheter is in good position no need to change or reposition.  I spoke with the patient and patient wife at bedside and informed about my impression plan.  All questions were answered.    Electronically signed by   Saurav Tellez MD on 3/4/2025 at 7:27 PM

## 2025-03-05 ENCOUNTER — TELEPHONE (OUTPATIENT)
Dept: CARDIOLOGY CLINIC | Age: 60
End: 2025-03-05

## 2025-03-05 VITALS
HEIGHT: 69 IN | RESPIRATION RATE: 18 BRPM | BODY MASS INDEX: 28.39 KG/M2 | OXYGEN SATURATION: 94 % | WEIGHT: 191.7 LBS | SYSTOLIC BLOOD PRESSURE: 103 MMHG | TEMPERATURE: 98.9 F | HEART RATE: 87 BPM | DIASTOLIC BLOOD PRESSURE: 58 MMHG

## 2025-03-05 PROBLEM — M79.89 SWELLING OF LOWER EXTREMITY: Status: ACTIVE | Noted: 2025-03-05

## 2025-03-05 PROBLEM — C34.90 NON-SMALL CELL LUNG CANCER (HCC): Status: ACTIVE | Noted: 2024-07-03

## 2025-03-05 LAB
ANION GAP SERPL CALC-SCNC: 10 MEQ/L (ref 8–16)
BUN SERPL-MCNC: 9 MG/DL (ref 8–23)
CALCIUM SERPL-MCNC: 8.1 MG/DL (ref 8.8–10.2)
CHLORIDE SERPL-SCNC: 94 MEQ/L (ref 98–111)
CO2 SERPL-SCNC: 27 MEQ/L (ref 22–29)
CREAT SERPL-MCNC: 0.6 MG/DL (ref 0.7–1.2)
DEPRECATED RDW RBC AUTO: 70 FL (ref 35–45)
ECHO AV CUSP MM: 1.9 CM
ECHO BSA: 2.06 M2
ECHO LA DIAMETER INDEX: 1.72 CM/M2
ECHO LA DIAMETER: 3.5 CM
ECHO LV EF PHYSICIAN: 55 %
ECHO LV FRACTIONAL SHORTENING: 38 % (ref 28–44)
ECHO LV INTERNAL DIMENSION DIASTOLE INDEX: 2.07 CM/M2
ECHO LV INTERNAL DIMENSION DIASTOLIC: 4.2 CM (ref 4.2–5.9)
ECHO LV INTERNAL DIMENSION SYSTOLIC INDEX: 1.28 CM/M2
ECHO LV INTERNAL DIMENSION SYSTOLIC: 2.6 CM
ECHO LV IVSD: 0.9 CM (ref 0.6–1)
ECHO LV MASS 2D: 127.8 G (ref 88–224)
ECHO LV MASS INDEX 2D: 63 G/M2 (ref 49–115)
ECHO LV POSTERIOR WALL DIASTOLIC: 1 CM (ref 0.6–1)
ECHO LV RELATIVE WALL THICKNESS RATIO: 0.48
ECHO RV INTERNAL DIMENSION: 3.4 CM
ERYTHROCYTE [DISTWIDTH] IN BLOOD BY AUTOMATED COUNT: 22.7 % (ref 11.5–14.5)
GFR SERPL CREATININE-BSD FRML MDRD: > 90 ML/MIN/1.73M2
GLUCOSE SERPL-MCNC: 111 MG/DL (ref 74–109)
HCT VFR BLD AUTO: 25.2 % (ref 42–52)
HGB BLD-MCNC: 7.2 GM/DL (ref 14–18)
MAGNESIUM SERPL-MCNC: 1.9 MG/DL (ref 1.6–2.6)
MCH RBC QN AUTO: 24.9 PG (ref 26–33)
MCHC RBC AUTO-ENTMCNC: 28.6 GM/DL (ref 32.2–35.5)
MCV RBC AUTO: 87.2 FL (ref 80–94)
OSMOLALITY UR: 467 MOSMOL/KG (ref 250–750)
PLATELET # BLD AUTO: 207 THOU/MM3 (ref 130–400)
PMV BLD AUTO: 10.2 FL (ref 9.4–12.4)
POTASSIUM SERPL-SCNC: 3.8 MEQ/L (ref 3.5–5.2)
RBC # BLD AUTO: 2.89 MILL/MM3 (ref 4.7–6.1)
SIROLIMUS BLD-MCNC: 4.2 NG/ML
SODIUM SERPL-SCNC: 131 MEQ/L (ref 135–145)
SODIUM UR-SCNC: 29 MEQ/L
WBC # BLD AUTO: 9 THOU/MM3 (ref 4.8–10.8)

## 2025-03-05 PROCEDURE — 6360000002 HC RX W HCPCS

## 2025-03-05 PROCEDURE — 36415 COLL VENOUS BLD VENIPUNCTURE: CPT

## 2025-03-05 PROCEDURE — 83935 ASSAY OF URINE OSMOLALITY: CPT

## 2025-03-05 PROCEDURE — 84300 ASSAY OF URINE SODIUM: CPT

## 2025-03-05 PROCEDURE — 6370000000 HC RX 637 (ALT 250 FOR IP)

## 2025-03-05 PROCEDURE — 80048 BASIC METABOLIC PNL TOTAL CA: CPT

## 2025-03-05 PROCEDURE — 99239 HOSP IP/OBS DSCHRG MGMT >30: CPT

## 2025-03-05 PROCEDURE — 85027 COMPLETE CBC AUTOMATED: CPT

## 2025-03-05 PROCEDURE — 99232 SBSQ HOSP IP/OBS MODERATE 35: CPT | Performed by: INTERNAL MEDICINE

## 2025-03-05 PROCEDURE — 2500000003 HC RX 250 WO HCPCS

## 2025-03-05 PROCEDURE — 83735 ASSAY OF MAGNESIUM: CPT

## 2025-03-05 RX ORDER — FUROSEMIDE 40 MG/1
40 TABLET ORAL DAILY
Qty: 30 TABLET | Refills: 0 | Status: SHIPPED | OUTPATIENT
Start: 2025-03-05

## 2025-03-05 RX ORDER — FUROSEMIDE 10 MG/ML
20 INJECTION INTRAMUSCULAR; INTRAVENOUS ONCE
Status: COMPLETED | OUTPATIENT
Start: 2025-03-05 | End: 2025-03-05

## 2025-03-05 RX ADMIN — PANTOPRAZOLE SODIUM 40 MG: 40 TABLET, DELAYED RELEASE ORAL at 06:45

## 2025-03-05 RX ADMIN — PREDNISONE 10 MG: 10 TABLET ORAL at 08:13

## 2025-03-05 RX ADMIN — METOPROLOL TARTRATE 50 MG: 50 TABLET, FILM COATED ORAL at 08:13

## 2025-03-05 RX ADMIN — OXYCODONE HYDROCHLORIDE AND ACETAMINOPHEN 1000 MG: 500 TABLET ORAL at 08:13

## 2025-03-05 RX ADMIN — APIXABAN 5 MG: 5 TABLET, FILM COATED ORAL at 08:13

## 2025-03-05 RX ADMIN — SODIUM CHLORIDE, PRESERVATIVE FREE 10 ML: 5 INJECTION INTRAVENOUS at 08:14

## 2025-03-05 RX ADMIN — SIROLIMUS 1 MG: 1 TABLET ORAL at 08:14

## 2025-03-05 RX ADMIN — FOLIC ACID 1 MG: 1 TABLET ORAL at 08:13

## 2025-03-05 RX ADMIN — CALCIUM POLYCARBOPHIL 625 MG: 625 TABLET, FILM COATED ORAL at 08:14

## 2025-03-05 RX ADMIN — Medication 1 TABLET: at 08:14

## 2025-03-05 RX ADMIN — OXYCODONE 20 MG: 5 TABLET ORAL at 00:23

## 2025-03-05 RX ADMIN — OXYCODONE 20 MG: 5 TABLET ORAL at 08:14

## 2025-03-05 RX ADMIN — FUROSEMIDE 20 MG: 10 INJECTION, SOLUTION INTRAMUSCULAR; INTRAVENOUS at 08:14

## 2025-03-05 ASSESSMENT — PAIN DESCRIPTION - PAIN TYPE
TYPE: CHRONIC PAIN
TYPE: CHRONIC PAIN

## 2025-03-05 ASSESSMENT — PAIN DESCRIPTION - DESCRIPTORS
DESCRIPTORS: DULL
DESCRIPTORS: ACHING

## 2025-03-05 ASSESSMENT — PAIN - FUNCTIONAL ASSESSMENT
PAIN_FUNCTIONAL_ASSESSMENT: PREVENTS OR INTERFERES SOME ACTIVE ACTIVITIES AND ADLS
PAIN_FUNCTIONAL_ASSESSMENT: PREVENTS OR INTERFERES SOME ACTIVE ACTIVITIES AND ADLS

## 2025-03-05 ASSESSMENT — PAIN DESCRIPTION - ORIENTATION
ORIENTATION: LOWER
ORIENTATION: LOWER;LEFT;RIGHT

## 2025-03-05 ASSESSMENT — PAIN SCALES - GENERAL
PAINLEVEL_OUTOF10: 7
PAINLEVEL_OUTOF10: 5
PAINLEVEL_OUTOF10: 7

## 2025-03-05 ASSESSMENT — PAIN DESCRIPTION - FREQUENCY: FREQUENCY: CONTINUOUS

## 2025-03-05 ASSESSMENT — PAIN DESCRIPTION - LOCATION
LOCATION: BACK
LOCATION: BACK

## 2025-03-05 ASSESSMENT — PAIN DESCRIPTION - ONSET: ONSET: ON-GOING

## 2025-03-05 NOTE — DISCHARGE INSTRUCTIONS
Start Lasix 40mg daily  Repeat CBC/BMP 1 week  Follow-up with [facility] transplant/pulm for evaluation  Follow-up PCP in 1 week - obtain labs prior to appointment

## 2025-03-05 NOTE — PLAN OF CARE
Problem: Chronic Conditions and Co-morbidities  Goal: Patient's chronic conditions and co-morbidity symptoms are monitored and maintained or improved  Outcome: Adequate for Discharge     Problem: Discharge Planning  Goal: Discharge to home or other facility with appropriate resources  Outcome: Adequate for Discharge     Problem: Pain  Goal: Verbalizes/displays adequate comfort level or baseline comfort level  Outcome: Adequate for Discharge     Problem: Safety - Adult  Goal: Free from fall injury  Outcome: Adequate for Discharge     Problem: ABCDS Injury Assessment  Goal: Absence of physical injury  Outcome: Adequate for Discharge     
of physical injury  Outcome: Progressing  Flowsheets (Taken 3/4/2025 0479)  Absence of Physical Injury: Implement safety measures based on patient assessment

## 2025-03-05 NOTE — DISCHARGE SUMMARY
P [phone] - F [phone]  [address], [facility] [address]      Phone: [phone]   furosemide 40 MG tablet                Time Spent on discharge is >35 minutes in the examination, evaluation, counseling and review of medications and discharge plan.    Thank you Daniel Barbosa MD for the opportunity to be involved in this patient's care.      Signed:    Electronically signed by Colby Valenzuela MD on 3/5/25 at 1:50 PM EST

## 2025-03-05 NOTE — CARE COORDINATION
3/5/25, 2:10 PM EST    Patient goals/plan/ treatment preferences discussed by  and .  Patient goals/plan/ treatment preferences reviewed with patient/ family.  Patient/ family verbalize understanding of discharge plan and are in agreement with goal/plan/treatment preferences.  Understanding was demonstrated using the teach back method.  AVS provided by RN at time of discharge, which includes all necessary medical information pertaining to the patients current course of illness, treatment, post-discharge goals of care, and treatment preferences.     Services At/After Discharge: IV Therapy       Return home w wife. Chemo @ [facility] and the [facility] with plans to transfer infusions locally to  [facility] start [facility] 3/14/25. OP PT/OT at [facility]. Has a toilet riser. Denies needs.

## 2025-03-06 ENCOUNTER — TELEPHONE (OUTPATIENT)
Dept: CARDIOLOGY CLINIC | Age: 60
End: 2025-03-06

## 2025-03-06 ENCOUNTER — APPOINTMENT (OUTPATIENT)
Dept: GENERAL RADIOLOGY | Age: 60
DRG: 392 | End: 2025-03-06
Payer: COMMERCIAL

## 2025-03-06 ENCOUNTER — HOSPITAL ENCOUNTER (INPATIENT)
Age: 60
LOS: 1 days | Discharge: HOME | DRG: 392 | End: 2025-03-08
Attending: EMERGENCY MEDICINE
Payer: COMMERCIAL

## 2025-03-06 ENCOUNTER — APPOINTMENT (OUTPATIENT)
Dept: CT IMAGING | Age: 60
DRG: 392 | End: 2025-03-06
Payer: COMMERCIAL

## 2025-03-06 DIAGNOSIS — Z85.118 HISTORY OF LUNG CANCER: ICD-10-CM

## 2025-03-06 DIAGNOSIS — B99.9 FEVER DUE TO INFECTION: Primary | ICD-10-CM

## 2025-03-06 DIAGNOSIS — G89.3 CANCER RELATED PAIN: ICD-10-CM

## 2025-03-06 DIAGNOSIS — C80.1 METASTATIC CARCINOMA INVOLVING BONE WITH UNKNOWN PRIMARY SITE (HCC): ICD-10-CM

## 2025-03-06 DIAGNOSIS — C79.51 METASTATIC CARCINOMA INVOLVING BONE WITH UNKNOWN PRIMARY SITE (HCC): ICD-10-CM

## 2025-03-06 DIAGNOSIS — C34.90 NON-SMALL CELL LUNG CANCER, UNSPECIFIED LATERALITY (HCC): ICD-10-CM

## 2025-03-06 DIAGNOSIS — Z51.5 PALLIATIVE CARE PATIENT: ICD-10-CM

## 2025-03-06 DIAGNOSIS — R53.1 GENERALIZED WEAKNESS: ICD-10-CM
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
## 2025-03-06 DIAGNOSIS — C34.31 PRIMARY MALIGNANT NEOPLASM OF RIGHT LOWER LOBE OF LUNG (HCC): ICD-10-CM

## 2025-03-06 LAB
ALBUMIN SERPL BCG-MCNC: 2.7 G/DL (ref 3.4–4.9)
ALP SERPL-CCNC: 194 U/L (ref 40–129)
ALT SERPL W/O P-5'-P-CCNC: 27 U/L (ref 10–50)
ANION GAP SERPL CALC-SCNC: 14 MEQ/L (ref 8–16)
ANISOCYTOSIS BLD QL SMEAR: PRESENT
AST SERPL-CCNC: 44 U/L (ref 10–50)
BASOPHILS ABSOLUTE: 0 THOU/MM3 (ref 0–0.1)
BASOPHILS NFR BLD AUTO: 0.1 %
BILIRUB SERPL-MCNC: 0.3 MG/DL (ref 0.3–1.2)
BUN SERPL-MCNC: 14 MG/DL (ref 8–23)
C CAYETANENSIS DNA SPEC QL NAA+PROBE: NOT DETECTED
C DIFF GDH STL QL: NEGATIVE
CA-I BLD ISE-SCNC: 1.07 MMOL/L (ref 1.12–1.32)
CALCIUM SERPL-MCNC: 8 MG/DL (ref 8.8–10.2)
CAMPY SP DNA.DIARRHEA STL QL NAA+PROBE: NOT DETECTED
CHLORIDE SERPL-SCNC: 94 MEQ/L (ref 98–111)
CK SERPL-CCNC: 43 U/L (ref 39–308)
CO2 SERPL-SCNC: 25 MEQ/L (ref 22–29)
CREAT SERPL-MCNC: 0.7 MG/DL (ref 0.7–1.2)
CRYPTOSP DNA SPEC QL NAA+PROBE: NOT DETECTED
DEPRECATED RDW RBC AUTO: 68.7 FL (ref 35–45)
E COLI O157H7 DNA SPEC QL NAA+PROBE: ABNORMAL
E HISTOLYT DNA SPEC QL NAA+PROBE: NOT DETECTED
EAEC PAA PLAS AGGR+AATA ST NAA+NON-PRB: NOT DETECTED
EC STX1+STX2 + H7 FLIC SPEC NAA+PROBE: NOT DETECTED
EKG ATRIAL RATE: 110 BPM
EKG P AXIS: 71 DEGREES
EKG P-R INTERVAL: 140 MS
EKG Q-T INTERVAL: 348 MS
EKG QRS DURATION: 98 MS
EKG QTC CALCULATION (BAZETT): 470 MS
EKG R AXIS: -59 DEGREES
EKG T AXIS: 63 DEGREES
EKG VENTRICULAR RATE: 110 BPM
EOSINOPHIL NFR BLD AUTO: 0.2 %
EOSINOPHILS ABSOLUTE: 0 THOU/MM3 (ref 0–0.4)
EPEC EAE GENE STL QL NAA+NON-PROBE: NOT DETECTED
ERYTHROCYTE [DISTWIDTH] IN BLOOD BY AUTOMATED COUNT: 22.3 % (ref 11.5–14.5)
ETEC LTA+ST1A+ST1B TOX ST NAA+NON-PROBE: NOT DETECTED
FLUAV RNA RESP QL NAA+PROBE: NOT DETECTED
FLUBV RNA RESP QL NAA+PROBE: NOT DETECTED
G LAMBLIA DNA SPEC QL NAA+PROBE: NOT DETECTED
GFR SERPL CREATININE-BSD FRML MDRD: > 90 ML/MIN/1.73M2
GLUCOSE SERPL-MCNC: 162 MG/DL (ref 74–109)
HADV DNA SPEC QL NAA+PROBE: NOT DETECTED
HASTV RNA SPEC QL NAA+PROBE: NOT DETECTED
HCT VFR BLD AUTO: 28.4 % (ref 42–52)
HGB BLD-MCNC: 8.1 GM/DL (ref 14–18)
HYPOCHROMIA BLD QL SMEAR: PRESENT
IMM GRANULOCYTES # BLD AUTO: 0.18 THOU/MM3 (ref 0–0.07)
IMM GRANULOCYTES NFR BLD AUTO: 1.6 %
LACTATE SERPL-SCNC: 1.6 MMOL/L (ref 0.5–2)
LACTIC ACID, SEPSIS: 1.6 MMOL/L (ref 0.5–1.9)
LACTIC ACID, SEPSIS: 2.7 MMOL/L (ref 0.5–1.9)
LYMPHOCYTES ABSOLUTE: 0.5 THOU/MM3 (ref 1–4.8)
LYMPHOCYTES NFR BLD AUTO: 3.9 %
MCH RBC QN AUTO: 24.9 PG (ref 26–33)
MCHC RBC AUTO-ENTMCNC: 28.5 GM/DL (ref 32.2–35.5)
MCV RBC AUTO: 87.4 FL (ref 80–94)
MONOCYTES ABSOLUTE: 1 THOU/MM3 (ref 0.4–1.3)
MONOCYTES NFR BLD AUTO: 8.5 %
NEUTROPHILS ABSOLUTE: 9.9 THOU/MM3 (ref 1.8–7.7)
NEUTROPHILS NFR BLD AUTO: 85.7 %
NOROVIRUS GI + GII RNA STL NAA+PROBE: DETECTED
NRBC BLD AUTO-RTO: 0 /100 WBC
OSMOLALITY SERPL CALC.SUM OF ELEC: 270.4 MOSMOL/KG (ref 275–300)
P SHIGELLOIDES DNA STL QL NAA+PROBE: NOT DETECTED
PLATELET # BLD AUTO: 231 THOU/MM3 (ref 130–400)
PMV BLD AUTO: 9.6 FL (ref 9.4–12.4)
POTASSIUM SERPL-SCNC: 4.2 MEQ/L (ref 3.5–5.2)
PROT SERPL-MCNC: 5.1 G/DL (ref 6.4–8.3)
RBC # BLD AUTO: 3.25 MILL/MM3 (ref 4.7–6.1)
RV RNA SPEC QL NAA+PROBE: DETECTED
SALMONELLA DNA SPEC QL NAA+PROBE: NOT DETECTED
SAPOVIRUS RNA SPEC QL NAA+PROBE: NOT DETECTED
SARS-COV-2 RNA RESP QL NAA+PROBE: NOT DETECTED
SCAN OF BLOOD SMEAR: NORMAL
SHIGELLA SP+EIEC IPAH ST NAA+NON-PROBE: NOT DETECTED
SODIUM SERPL-SCNC: 133 MEQ/L (ref 135–145)
V CHOLERAE DNA SPEC QL NAA+PROBE: NOT DETECTED
VIBRIO DNA SPEC NAA+PROBE: NOT DETECTED
WBC # BLD AUTO: 11.6 THOU/MM3 (ref 4.8–10.8)
Y ENTERO RECN STL QL NAA+PROBE: NOT DETECTED

## 2025-03-06 PROCEDURE — 96375 TX/PRO/DX INJ NEW DRUG ADDON: CPT

## 2025-03-06 PROCEDURE — 71045 X-RAY EXAM CHEST 1 VIEW: CPT

## 2025-03-06 PROCEDURE — G0378 HOSPITAL OBSERVATION PER HR: HCPCS

## 2025-03-06 PROCEDURE — 2500000003 HC RX 250 WO HCPCS: Performed by: EMERGENCY MEDICINE

## 2025-03-06 PROCEDURE — 96365 THER/PROPH/DIAG IV INF INIT: CPT

## 2025-03-06 PROCEDURE — 2580000003 HC RX 258

## 2025-03-06 PROCEDURE — 36415 COLL VENOUS BLD VENIPUNCTURE: CPT

## 2025-03-06 PROCEDURE — 87636 SARSCOV2 & INF A&B AMP PRB: CPT

## 2025-03-06 PROCEDURE — 82330 ASSAY OF CALCIUM: CPT

## 2025-03-06 PROCEDURE — 96367 TX/PROPH/DG ADDL SEQ IV INF: CPT

## 2025-03-06 PROCEDURE — 72125 CT NECK SPINE W/O DYE: CPT

## 2025-03-06 PROCEDURE — 99285 EMERGENCY DEPT VISIT HI MDM: CPT

## 2025-03-06 PROCEDURE — 6370000000 HC RX 637 (ALT 250 FOR IP)

## 2025-03-06 PROCEDURE — 2500000003 HC RX 250 WO HCPCS

## 2025-03-06 PROCEDURE — 83605 ASSAY OF LACTIC ACID: CPT

## 2025-03-06 PROCEDURE — 85025 COMPLETE CBC W/AUTO DIFF WBC: CPT

## 2025-03-06 PROCEDURE — 87507 IADNA-DNA/RNA PROBE TQ 12-25: CPT

## 2025-03-06 PROCEDURE — 96361 HYDRATE IV INFUSION ADD-ON: CPT

## 2025-03-06 PROCEDURE — 82550 ASSAY OF CK (CPK): CPT

## 2025-03-06 PROCEDURE — 99223 1ST HOSP IP/OBS HIGH 75: CPT | Performed by: STUDENT IN AN ORGANIZED HEALTH CARE EDUCATION/TRAINING PROGRAM

## 2025-03-06 PROCEDURE — 2580000003 HC RX 258: Performed by: EMERGENCY MEDICINE

## 2025-03-06 PROCEDURE — 6360000002 HC RX W HCPCS: Performed by: EMERGENCY MEDICINE

## 2025-03-06 PROCEDURE — 96366 THER/PROPH/DIAG IV INF ADDON: CPT

## 2025-03-06 PROCEDURE — 87449 NOS EACH ORGANISM AG IA: CPT

## 2025-03-06 PROCEDURE — 93005 ELECTROCARDIOGRAM TRACING: CPT | Performed by: EMERGENCY MEDICINE

## 2025-03-06 PROCEDURE — 87040 BLOOD CULTURE FOR BACTERIA: CPT

## 2025-03-06 PROCEDURE — 6360000002 HC RX W HCPCS

## 2025-03-06 PROCEDURE — 97535 SELF CARE MNGMENT TRAINING: CPT

## 2025-03-06 PROCEDURE — 6370000000 HC RX 637 (ALT 250 FOR IP): Performed by: EMERGENCY MEDICINE

## 2025-03-06 PROCEDURE — 97166 OT EVAL MOD COMPLEX 45 MIN: CPT

## 2025-03-06 PROCEDURE — 80195 ASSAY OF SIROLIMUS: CPT

## 2025-03-06 PROCEDURE — 80053 COMPREHEN METABOLIC PANEL: CPT

## 2025-03-06 RX ORDER — METOPROLOL TARTRATE 50 MG
50 TABLET ORAL 2 TIMES DAILY
Status: DISCONTINUED | OUTPATIENT
Start: 2025-03-06 | End: 2025-03-07

## 2025-03-06 RX ORDER — 0.9 % SODIUM CHLORIDE 0.9 %
2000 INTRAVENOUS SOLUTION INTRAVENOUS ONCE
Status: COMPLETED | OUTPATIENT
Start: 2025-03-06 | End: 2025-03-06

## 2025-03-06 RX ORDER — MULTIVITAMIN WITH IRON
1 TABLET ORAL DAILY
Status: DISCONTINUED | OUTPATIENT
Start: 2025-03-06 | End: 2025-03-08 | Stop reason: HOSPADM

## 2025-03-06 RX ORDER — ACETAMINOPHEN 325 MG/1
650 TABLET ORAL EVERY 6 HOURS PRN
Status: DISCONTINUED | OUTPATIENT
Start: 2025-03-06 | End: 2025-03-08 | Stop reason: HOSPADM

## 2025-03-06 RX ORDER — ACETAMINOPHEN 325 MG/1
650 TABLET ORAL ONCE
Status: COMPLETED | OUTPATIENT
Start: 2025-03-06 | End: 2025-03-06

## 2025-03-06 RX ORDER — SODIUM CHLORIDE 0.9 % (FLUSH) 0.9 %
5-40 SYRINGE (ML) INJECTION EVERY 12 HOURS SCHEDULED
Status: DISCONTINUED | OUTPATIENT
Start: 2025-03-06 | End: 2025-03-08 | Stop reason: HOSPADM

## 2025-03-06 RX ORDER — FUROSEMIDE 40 MG/1
40 TABLET ORAL DAILY
Status: DISCONTINUED | OUTPATIENT
Start: 2025-03-06 | End: 2025-03-08 | Stop reason: HOSPADM

## 2025-03-06 RX ORDER — PROCHLORPERAZINE MALEATE 10 MG
10 TABLET ORAL EVERY 6 HOURS PRN
Status: DISCONTINUED | OUTPATIENT
Start: 2025-03-06 | End: 2025-03-08 | Stop reason: HOSPADM

## 2025-03-06 RX ORDER — CALCIUM GLUCONATE 20 MG/ML
2000 INJECTION, SOLUTION INTRAVENOUS ONCE
Status: COMPLETED | OUTPATIENT
Start: 2025-03-06 | End: 2025-03-06

## 2025-03-06 RX ORDER — SIROLIMUS 1 MG/1
1 TABLET, FILM COATED ORAL DAILY
Status: DISCONTINUED | OUTPATIENT
Start: 2025-03-06 | End: 2025-03-08 | Stop reason: HOSPADM

## 2025-03-06 RX ORDER — POLYETHYLENE GLYCOL 3350 17 G/17G
17 POWDER, FOR SOLUTION ORAL DAILY PRN
Status: DISCONTINUED | OUTPATIENT
Start: 2025-03-06 | End: 2025-03-08 | Stop reason: HOSPADM

## 2025-03-06 RX ORDER — ASCORBIC ACID 500 MG
1000 TABLET ORAL DAILY
Status: DISCONTINUED | OUTPATIENT
Start: 2025-03-06 | End: 2025-03-08 | Stop reason: HOSPADM

## 2025-03-06 RX ORDER — FENTANYL 75 UG/1
1 PATCH TRANSDERMAL
Status: DISCONTINUED | OUTPATIENT
Start: 2025-03-06 | End: 2025-03-08 | Stop reason: HOSPADM

## 2025-03-06 RX ORDER — FOLIC ACID 1 MG/1
1 TABLET ORAL DAILY
Status: DISCONTINUED | OUTPATIENT
Start: 2025-03-06 | End: 2025-03-08 | Stop reason: HOSPADM

## 2025-03-06 RX ORDER — SODIUM CHLORIDE 0.9 % (FLUSH) 0.9 %
5-40 SYRINGE (ML) INJECTION PRN
Status: DISCONTINUED | OUTPATIENT
Start: 2025-03-06 | End: 2025-03-08 | Stop reason: HOSPADM

## 2025-03-06 RX ORDER — OXYCODONE HYDROCHLORIDE 5 MG/1
10 TABLET ORAL EVERY 4 HOURS PRN
Status: DISCONTINUED | OUTPATIENT
Start: 2025-03-06 | End: 2025-03-07

## 2025-03-06 RX ORDER — SODIUM CHLORIDE 9 MG/ML
INJECTION, SOLUTION INTRAVENOUS PRN
Status: DISCONTINUED | OUTPATIENT
Start: 2025-03-06 | End: 2025-03-08 | Stop reason: HOSPADM

## 2025-03-06 RX ORDER — ONDANSETRON 4 MG/1
4 TABLET, ORALLY DISINTEGRATING ORAL EVERY 8 HOURS PRN
Status: DISCONTINUED | OUTPATIENT
Start: 2025-03-06 | End: 2025-03-08 | Stop reason: HOSPADM

## 2025-03-06 RX ORDER — ERGOCALCIFEROL 1.25 MG/1
50000 CAPSULE, LIQUID FILLED ORAL WEEKLY
Status: DISCONTINUED | OUTPATIENT
Start: 2025-03-10 | End: 2025-03-08 | Stop reason: HOSPADM

## 2025-03-06 RX ORDER — ACETAMINOPHEN 650 MG/1
650 SUPPOSITORY RECTAL EVERY 6 HOURS PRN
Status: DISCONTINUED | OUTPATIENT
Start: 2025-03-06 | End: 2025-03-08 | Stop reason: HOSPADM

## 2025-03-06 RX ORDER — PREDNISONE 10 MG/1
10 TABLET ORAL DAILY
Status: DISCONTINUED | OUTPATIENT
Start: 2025-03-06 | End: 2025-03-08 | Stop reason: HOSPADM

## 2025-03-06 RX ORDER — ONDANSETRON 2 MG/ML
4 INJECTION INTRAMUSCULAR; INTRAVENOUS EVERY 6 HOURS PRN
Status: DISCONTINUED | OUTPATIENT
Start: 2025-03-06 | End: 2025-03-08 | Stop reason: HOSPADM

## 2025-03-06 RX ORDER — GLUCAGON 1 MG/ML
1 KIT INJECTION PRN
Status: DISCONTINUED | OUTPATIENT
Start: 2025-03-06 | End: 2025-03-08 | Stop reason: HOSPADM

## 2025-03-06 RX ORDER — DEXTROSE MONOHYDRATE 100 MG/ML
INJECTION, SOLUTION INTRAVENOUS CONTINUOUS PRN
Status: DISCONTINUED | OUTPATIENT
Start: 2025-03-06 | End: 2025-03-08 | Stop reason: HOSPADM

## 2025-03-06 RX ORDER — PANTOPRAZOLE SODIUM 40 MG/1
40 TABLET, DELAYED RELEASE ORAL
Status: DISCONTINUED | OUTPATIENT
Start: 2025-03-07 | End: 2025-03-08 | Stop reason: HOSPADM

## 2025-03-06 RX ADMIN — SIROLIMUS 1 MG: 1 TABLET ORAL at 10:31

## 2025-03-06 RX ADMIN — PREDNISONE 10 MG: 10 TABLET ORAL at 10:31

## 2025-03-06 RX ADMIN — PIPERACILLIN AND TAZOBACTAM 4500 MG: 4; .5 INJECTION, POWDER, FOR SOLUTION INTRAVENOUS at 13:44

## 2025-03-06 RX ADMIN — SODIUM CHLORIDE, PRESERVATIVE FREE 10 ML: 5 INJECTION INTRAVENOUS at 21:05

## 2025-03-06 RX ADMIN — APIXABAN 5 MG: 5 TABLET, FILM COATED ORAL at 21:04

## 2025-03-06 RX ADMIN — APIXABAN 5 MG: 5 TABLET, FILM COATED ORAL at 10:35

## 2025-03-06 RX ADMIN — SODIUM CHLORIDE 2000 ML: 9 INJECTION, SOLUTION INTRAVENOUS at 05:48

## 2025-03-06 RX ADMIN — PIPERACILLIN AND TAZOBACTAM 3375 MG: 3; .375 INJECTION, POWDER, FOR SOLUTION INTRAVENOUS at 17:35

## 2025-03-06 RX ADMIN — Medication 1 TABLET: at 10:34

## 2025-03-06 RX ADMIN — METOPROLOL TARTRATE 50 MG: 50 TABLET, FILM COATED ORAL at 10:35

## 2025-03-06 RX ADMIN — FOLIC ACID 1 MG: 1 TABLET ORAL at 10:35

## 2025-03-06 RX ADMIN — VANCOMYCIN HYDROCHLORIDE 1000 MG: 1 INJECTION, POWDER, LYOPHILIZED, FOR SOLUTION INTRAVENOUS at 05:51

## 2025-03-06 RX ADMIN — WATER 2000 MG: 1 INJECTION INTRAMUSCULAR; INTRAVENOUS; SUBCUTANEOUS at 06:52

## 2025-03-06 RX ADMIN — CALCIUM GLUCONATE 2000 MG: 20 INJECTION, SOLUTION INTRAVENOUS at 21:08

## 2025-03-06 RX ADMIN — ACETAMINOPHEN 650 MG: 325 TABLET ORAL at 05:45

## 2025-03-06 RX ADMIN — OXYCODONE HYDROCHLORIDE AND ACETAMINOPHEN 1000 MG: 500 TABLET ORAL at 10:35

## 2025-03-06 ASSESSMENT — PAIN - FUNCTIONAL ASSESSMENT
PAIN_FUNCTIONAL_ASSESSMENT: NONE - DENIES PAIN

## 2025-03-06 ASSESSMENT — PAIN DESCRIPTION - LOCATION: LOCATION: SHOULDER

## 2025-03-06 ASSESSMENT — PAIN SCALES - GENERAL: PAINLEVEL_OUTOF10: 4

## 2025-03-06 ASSESSMENT — PAIN DESCRIPTION - ORIENTATION: ORIENTATION: RIGHT

## 2025-03-06 NOTE — TELEPHONE ENCOUNTER
Pt spouse stopped at  and stated that pt was currently admitted and will need to reschedule the stress test. Pt is too weak to have one done now. Please advise

## 2025-03-06 NOTE — PROGRESS NOTES
Spiritual Health History and Assessment/Progress Note  [facility]    (P) Spiritual/Emotional Needs,  ,  ,      Name: Ousmane Lockett MRN: [id number]    Age: 59 y.o.     Sex: male   Language: English   Methodist: Oriental orthodox   Loculated pleural effusion     Date: 3/6/2025            Total Time Calculated: (P) 10 min              Spiritual Assessment began in [facility]        Referral/Consult From: (P) Nurse   Encounter Overview/Reason: (P) Spiritual/Emotional Needs  Service Provided For: (P) Patient    Selina, Belief, Meaning:   Patient identifies as spiritual, is connected with a selina tradition or spiritual practice, and has beliefs or practices that help with coping during difficult times  Family/Friends No family/friends present      Importance and Influence:  Patient has spiritual/personal beliefs that influence decisions regarding their health  Family/Friends No family/friends present    Community:  Patient is connected with a spiritual community and feels well-supported. Support system includes: Children and Friends  Family/Friends No family/friends present    Assessment and Plan of Care:     Patient Interventions include: Engaged in theological reflection and Provided sacramental/Oriental orthodox ritual  Family/Friends Interventions include: No family/friends present    Patient Plan of Care: Spiritual Care available upon further referral  Family/Friends Plan of Care: No family/friends present    Electronically signed by Chaplain Millie on 3/6/2025 at 3:21 PM

## 2025-03-06 NOTE — H&P
History & Physical  Internal Medicine Resident         Patient: Ousmane Lockett 59 y.o. male      : 1965  Date of Admission: 3/6/2025  Date of Service: Pt seen/examined on 25 and Admitted to Observation with expected LOS less than two midnights due to medical therapy.     ASSESSMENT AND PLAN  Diarrhea, Acute  Fever, Acute  -New onset diarrhea in the last 24 hours with accompanying leukocytosis and fever  -Molecular GI panel sent  -2025 Clostridium Difficile Toxin/Antigen negative  -Monitor stool for color and consistency  -S/P Cefepime/Vancomycin in ER; transitioned to 7 day course Piperacillin-Tazobactam regimen  -Query association to patient's malignancy. 2025 Oncology documentation indicated possibility of malignancy of GI origin given elevation of CA 19-9; consideration for imaging of the patient's abdomen/pelvis pending clinical course    S/P Mechanical Fall  - Patient endorses falling out of bed secondary to generalized weakness after utilizing the restroom  - Denies head trauma or loss of consciousness  - No focal neurologic deficits on examination  - 2025 CT Cervical Spine without Contrast showing no evidence of acute fracture; cervical collar placed by EMS removed  - Fall precautions  - PT/OT evaluation    Loculated Right Sided Pleural Effusion, Present on Admission  -S/P RLL Lobectomy, RUL Wedge/LN Dissection 2024  -S/P Right VATS, mechanical pleurodesis, pleural biopsy, Pleurx catheter placement 10/09/2024  -Previously established with [facility] thoracic clinic for advanced care recommendations  -Seen and evaluated by Dr. Saurav Tellez on prior admission; previous IR evaluation noting functionality of Pleurx; consideration for consultation pending clinical course  -2025 CXR showing moderate right loculated effusion with basilar opacity    Bilateral Lower Extremity Edema  Hypoalbuminemia  -Suspected secondary to protein calorie deficit in the context

## 2025-03-06 NOTE — PROGRESS NOTES
Pharmacy Note - Extended Infusion Beta-Lactam Adjustment    Piperacillin/Tazobactam 3375mg Q6h for treatment of Intra-abdominal Infection. Per [facility] Extended Infusion Beta-Lactam Policy, piperacillin/tazobactam will be changed to 4500mg loading dose followed by 3375mg Q8h extended infusion    Estimated Creatinine Clearance: Estimated Creatinine Clearance: 114 mL/min (based on SCr of 0.7 mg/dL).    BMI: Body mass index is 26.88 kg/m².    Please call with any questions.    Thank you,    Julianne France, PharmD  3/6/2025 11:41 AM

## 2025-03-06 NOTE — ED PROVIDER NOTES
Transfer of Care Note:   I have personally performed a face to face diagnostic evaluation on this patient. The patient's initial evaluation and plan have been discussed with the prior provider who initially evaluated the patient. Nursing Notes, Past Medical Hx, Past Surgical Hx, Social Hx, Allergies, and Family Hx were all reviewed.    (Please note that portions of this note were completed with a voice recognition program.  Efforts were made to edit the dictations but occasionally words are mis-transcribed.)    7:58 AM EST: The patient was evaluated.     Ousmane Lockett is a 59 y.o. male who presents to the Emergency Department for the evaluation of hx of lung CA, fever, concern for sepsis. He also reported neck pain after a fall therfore CT was ordered and result is pending at shift change.        RADIOLOGY: non-plain film images(s) such as CT, Ultrasound and MRI are read by the radiologist.  CT CERVICAL SPINE WO CONTRAST   Final Result      1. Straightening of the normal cervical lordosis with superimposed cervical   spondylosis at C3-4 and C5-6.   2. No acute fracture.   3. Lipoma in the soft soft tissues posteriorly on the left side measuring at   least 8.9 x 2.6 x 1.5 cm in size..               **This report has been created using voice recognition software. It may contain   minor errors which are inherent in voice recognition technology.**            Electronically signed by Dr. Christiano Abdullahi      XR CHEST PORTABLE   Final Result   Unchanged moderate right loculated effusion with basilar opacity.      This document has been electronically signed by: Jose Duke MD on    03/06/2025 06:38 AM          ED LABS:  Labs Reviewed   CBC WITH AUTO DIFFERENTIAL - Abnormal; Notable for the following components:       Result Value    WBC 11.6 (*)     RBC 3.25 (*)     Hemoglobin 8.1 (*)     Hematocrit 28.4 (*)     MCH 24.9 (*)     MCHC 28.5 (*)     RDW-CV 22.3 (*)     RDW-SD 68.7 (*)     Neutrophils Absolute 9.9 (*)      Lymphocytes Absolute 0.5 (*)     Immature Grans (Abs) 0.18 (*)     All other components within normal limits   COMPREHENSIVE METABOLIC PANEL - Abnormal; Notable for the following components:    Glucose 162 (*)     Sodium 133 (*)     Chloride 94 (*)     Calcium 8.0 (*)     Alkaline Phosphatase 194 (*)     Total Protein 5.1 (*)     Albumin 2.7 (*)     All other components within normal limits   OSMOLALITY - Abnormal; Notable for the following components:    Osmolality Calc 270.4 (*)     All other components within normal limits   COVID-19 & INFLUENZA COMBO   CULTURE, BLOOD 1   CULTURE, BLOOD 1   SCAN OF BLOOD SMEAR   ANION GAP   GLOMERULAR FILTRATION RATE, ESTIMATED   CK   LACTATE, SEPSIS   LACTATE, SEPSIS   URINALYSIS WITH REFLEX TO CULTURE       MDM:  Awaiting CT c-spine results at shift change, admit request send to hospitalist. Anticipate admit to medicine pending CT results.    CT results negative, hospitalist will admit.    FINAL IMPRESSION      1. Fever due to infection    2. Fall, initial encounter    3. History of lung cancer    4. Generalized weakness        Care of this patient was transferred from Dr. Ramirez to myself at shift change.    Provider:  I personally performed the services described in the documentation, reviewed and edited the documentation which was dictated in my presence, and it accurately records my words and actions.    WILLIE FLORIAN MD 3/6/25 8:02 AM       Willie Florian MD  03/06/25 0802

## 2025-03-06 NOTE — ED NOTES
Pt transported to [facility] in stable condition. Called floor and spoke to Laura prior to transport.

## 2025-03-06 NOTE — ED NOTES
ED to inpatient nurses report      Chief Complaint:  Chief Complaint   Patient presents with    Fall    Fatigue     Present to ED from: home    MOA:     LOC: alert and orientated to name, place, date  Mobility: Requires assistance * 1  Oxygen Baseline: RA    Current needs required: RA     Code Status:   Prior    What abnormal results were found and what did you give/do to treat them? Medicated per MAR, IV fluid bolus given. Pt on off bed pan frequently d/t loose stools. Stool sample collected and sent.   Any procedures or intervention occur?     Mental Status:  Level of Consciousness: Alert (0)    Psych Assessment:        Vitals:  Patient Vitals for the past 24 hrs:   BP Temp Temp src Pulse Resp SpO2 Height Weight   03/06/25 0845 (!) 118/54 -- -- (!) 101 20 96 % -- --   03/06/25 0715 127/78 -- -- (!) 109 20 95 % -- --   03/06/25 0646 (!) 118/59 99.9 °F (37.7 °C) Oral 95 17 96 % -- --   03/06/25 0551 115/63 -- -- (!) 102 17 96 % -- --   03/06/25 0423 -- -- -- -- -- 93 % -- --   03/06/25 0422 136/64 (!) 102.8 °F (39.3 °C) Oral (!) 111 28 -- 1.753 m (5' 9\") 82.6 kg (182 lb)        LDAs:   Peripheral IV 03/06/25 Left;Anterior Cephalic (Active)   Site Assessment Clean, dry & intact 03/06/25 0425   Line Status Blood return noted 03/06/25 0425       Ambulatory Status:  No data recorded    Diagnosis:  DISPOSITION Admitted 03/06/2025 08:18:26 AM   Final diagnoses:   Fever due to infection   Fall, initial encounter   History of lung cancer   Generalized weakness        Consults:  None     Pain Score:  Pain Assessment  Pain Assessment: None - Denies Pain    C-SSRS:   Risk of Suicide: No Risk    Sepsis Screening:       [place] Fall Risk:       Swallow Screening        Preferred Language:   English      ALLERGIES     Flexeril [cyclobenzaprine]    SURGICAL HISTORY       Past Surgical History:   Procedure Laterality Date    APPENDECTOMY      1972    BONE MARROW TRANSPLANT  1996    [facility] transplant once a year    CHOLECYSTECTOMY,

## 2025-03-06 NOTE — ED TRIAGE NOTES
Patient presents to ED via EMS from home with c/o fall and generalized weakness. Patient states he has been having diarrhea, fever, headache, and chills.

## 2025-03-06 NOTE — ED PROVIDER NOTES
[facility] Emergency Department  [address] [address]  Phone: [phone]      CHIEF COMPLAINT       Chief Complaint   Patient presents with    Fall    Fatigue       Nurses Notes reviewed and I agree except as noted in the HPI.      HISTORY OF PRESENT ILLNESS    Ousmane Lockett is a 59 y.o. male.    Patient has an underlying history of lung cancer currently under treatment.  He has been too weak to get up.  The wife says he has been unmanageable at home and was brought in by squad tonight.  Initially came in with temperature of 102.  Generalized aches and chills.  Had reported diarrhea.    REVIEW OF SYSTEMS           PAST MEDICAL HISTORY    has a past medical history of Cancer (HCC), CVA (cerebral vascular accident) (HCC), Hematoma, Hx of blood clots, Hyperlipidemia, Hypertension, Irregular heart rate, Non Hodgkin's lymphoma (HCC), NSCLC of right lung (HCC), Pneumonia, Renal transplant recipient, Sepsis due to pneumonia (HCC), Status post surgical removal of malignant neoplasm of skin, and Stroke (HCC).    SURGICAL HISTORY      has a past surgical history that includes Appendectomy; Kidney transplant (06/06/2000); bone marrow transplant (1996); Facial Surgery (N/A, 10/28/2020); Colonoscopy; other surgical history; other surgical history (06/09/2000); Cholecystectomy, laparoscopic (N/A, 11/19/2021); shoulder surgery (Right, 08/06/2024); Lung lobectomy (Right, 07/03/2024); Gallbladder surgery (2020); IR GUIDED PERC PLEURAL DRAIN W CATH INSERT (10/2024); and CT NEEDLE BIOPSY LIVER PERCUTANEOUS (1/16/2025).    CURRENT MEDICATIONS       Previous Medications    ACETAMINOPHEN (TYLENOL) 500 MG CAPS CAPSULE    Take 1-2 capsules by mouth every 6 hours as needed    APIXABAN (ELIQUIS) 5 MG TABS TABLET    Take 1 tablet by mouth 2 times daily    ASCORBIC ACID (VITAMIN C) 500 MG TABLET    Take 2 tablets by mouth daily    CHOLECALCIFEROL (VITAMIN D3) 1.25 MG (56845 UT) CAPS    Take by mouth    FENTANYL    INITIAL VITALS:  height is 1.753 m (5' 9\") and weight is 82.6 kg (182 lb). His oral temperature is 99.9 °F (37.7 °C). His blood pressure is 118/59 (abnormal) and his pulse is 95. His respiration is 17 and oxygen saturation is 96%.      Constitutional: Ill-appearing  Eyes:  Pupils are equal and reactive  HENT:  Atraumatic appearing  oropharynx moist, no pharyngeal exudates.  Neck- normal range of motion, supple   Respiratory: Scattered rhonchi  Cardiovascular: tachycardic but rate  GI:  Non tender, no rigidity, rebound or guarding  Musculoskeletal:  2/4 distal pulses, no pitting edema  Integument: warm and dry  Neurologic:  Alert & oriented x 3      DIAGNOSTIC RESULTS     EKG: All EKG's are interpreted by the Emergency Department Physician who either signs or Co-signs this chart in the absence of a cardiologist.  EKG interpreted by me showing sinus rhythm with a rate of 110, QRS of 98, QTc of 4-70, axis of -59.  Incomplete right bundle branch block.  Left anterior fascicular block    RADIOLOGY: non-plain film images(s) such as CT, Ultrasound and MRI are read by the radiologist.  chest x-ray interpreted by the radiologist showing unchanged right loculated effusion with basilar opacity    LABS:   Labs Reviewed   CBC WITH AUTO DIFFERENTIAL - Abnormal; Notable for the following components:       Result Value    WBC 11.6 (*)     RBC 3.25 (*)     Hemoglobin 8.1 (*)     Hematocrit 28.4 (*)     MCH 24.9 (*)     MCHC 28.5 (*)     RDW-CV 22.3 (*)     RDW-SD 68.7 (*)     Neutrophils Absolute 9.9 (*)     Lymphocytes Absolute 0.5 (*)     Immature Grans (Abs) 0.18 (*)     All other components within normal limits   COMPREHENSIVE METABOLIC PANEL - Abnormal; Notable for the following components:    Glucose 162 (*)     Sodium 133 (*)     Chloride 94 (*)     Calcium 8.0 (*)     Alkaline Phosphatase 194 (*)     Total Protein 5.1 (*)     Albumin 2.7 (*)     All other components within normal limits   OSMOLALITY - Abnormal; Notable for

## 2025-03-06 NOTE — PROGRESS NOTES
Pharmacy Note - Extended Infusion Beta-Lactam Dose Adjustment    Cefepime 50mg/kg q12h intermittent infusion for treatment of Sepsis of unknown etiology. Per [facility] Extended Infusion Beta-Lactam Policy, cefepime will be changed to 2000 mg loading dose followed by 2000 mg q8h extended infusion    Estimated Creatinine Clearance: Estimated Creatinine Clearance: 133 mL/min (A) (based on SCr of 0.6 mg/dL (L)).    Dialysis Status, IONA, CKD: Normal    BMI: Body mass index is 26.88 kg/m².    Rationale for Adjustment: Dose adjusted per [facility] Extended Infusion Policy based on renal function and indication. The above medication is renally eliminated and demonstrates time-dependent effects on bacterial eradication. Extended-infusion dosing strategy aims to enhance microbiologic and clinical efficacy.     Pharmacy will monitor renal function daily and adjust dose as necessary.    Please call with any questions.    Thank you,  Maribel Dobbins [facility], BCPS, BCGP  3/6/2025     5:31 AM

## 2025-03-06 NOTE — PROGRESS NOTES
Occupational Therapy  [facility]  INPATIENT OCCUPATIONAL THERAPY  [facility] 8AB  EVALUATION      Discharge Recommendations: Continue to assess pending progress, Patient would benefit from continued therapy after discharge  Equipment Recommendations:   continue to assess- if d/c home, pt would benefit from a 2WW      Time In: 0950  Time Out: 1022  Timed Code Treatment Minutes: 23 Minutes  Minutes: 32          Date: 3/6/2025  Patient Name: Ousmane Lockett,   Gender: male      MRN: [id number]  : 1965  (59 y.o.)  Referring Practitioner: Remy Delgado MD  Diagnosis: Loculated pleural effusion  Additional Pertinent Hx: 59 y.o. male hx of lung CA presents with c/o fall and generalized weakness. Patient states he has been having diarrhea, fever, headache, and chills. He also reported neck pain after a fall therefore CT ordered and demonstrated straightening of the normal cervical lordosis with superimposed cervical spondylosis at C3-4 and C5-6.    Restrictions/Precautions:  Restrictions/Precautions: Fall Risk, Contact Precautions    Subjective  Chart Reviewed: Yes, Orders, Progress Notes, History and Physical, Imaging  Patient assessed for rehabilitation services?: Yes  Family / Caregiver Present: No       Pain: 0/10:     Vitals: Vitals not assessed per clinical judgement, see nursing flowsheet    Social/Functional History:  Lives With: Spouse  Type of Home: House  Home Layout: Two level, Able to Live on Main level with bedroom/bathroom, Performs ADL's on one level  Home Access: Stairs to enter with rails  Entrance Stairs - Number of Steps: 2   Bathroom Shower/Tub: Walk-in shower, Shower chair with back  Bathroom Toilet: Handicap height       Prior Level of Assist for ADLs: Needs assistance  Homemaking Responsibilities: No  Prior Level of Assist for Transfers: Independent  Prior Level of Assist for Ambulation: Independent household ambulator, with or without device  Has the patient had two or  37.26    Following session, patient left in safe position with all fall risk precautions in place.               no palpable lymph nodes

## 2025-03-06 NOTE — PROGRESS NOTES
Hospitalist Progress Note      Patient:  Ousmane Lockett 59 y.o. male     : 1965  Unit/Bed:-23/023-A  Date of Admission: 3/3/2025        ASSESSMENT AND PLAN    Active Problems  #Loculated R-sided pleural effusion: CTA chest on admission with no PE, loculated right-sided pleural effusion.  Known history of NSCLC, patient with Pleurx catheter in place and follows at [facility].  He does report decrease in Pleurx output for the last roughly 1 month.  Pulmonology consulted, no current plan for any intervention.  Pleurx catheter appears to be in good location.  #Bilateral lower extremity edema, SOB: Noted on admission.  Recent echo performed  with EF 55 to 60%.  BNP noted to be elevated on admission.  Significant bilateral lower extremity edema noted, lower extremity ultrasound negative for DVT.  Limited echo ordered for further evaluation.  Will increase Lasix to 20 mg IV daily.  Strict I's/O's, daily weights  #NSCLC: With bone metastasis, recurrent effusion requiring Pleurx placement.  Continues on therapy outpatient with pemetrexed, pembrolizumab, carboplatin.  Follows with [facility] radiation oncology.  Continued on home pain meds, home supplements, bowel regimen  #Leukocytosis: Noted, suspect reactive.  Currently no infectious signs/symptoms, will monitor  #PAF: [id number] 3, has bled 1.with progression of underlying pulmonary disease.  Symptom burden has increased continued on rate control with Lopressor 50 mg twice daily.  Eliquis 5 mg twice daily.   #Elevated troponin: Downtrending, no chest pain.  EKG without acute ischemic ST/T wave changes.  Suspect demand, will monitor  #Chronic hypotonic hyponatremia: Present on admission, mild.  Suspect SIADH in the setting of above, urine labs pending.  Fluid restriction increased, will repeat in AM  Chronic Conditions (reviewed and stable unless otherwise stated)  #GERD: PPI  #Chronic normocytic anemia: No overt bleeding appreciated. 
  Physician Progress Note      PATIENT:               MICHEAL AGUILAR  CSN #:                  [id number]  :                       1965  ADMIT DATE:       3/3/2025 4:11 PM  DISCH DATE:        3/5/2025 3:36 PM  RESPONDING  PROVIDER #:        Colby Valenzuela MD          QUERY TEXT:    Patient admitted with pleural effusion, noted to have atrial fibrillation and   is maintained on Eliquis. If possible, please document in progress notes and   discharge summary if you are evaluating and/or treating any of the following:?  ?  The medical record reflects the following:  Risk Factors: 59 yr old, CA, anemia, GERD  Clinical Indicators: Pt with afib on Eliquis, [id number] 3  Treatment: Medication management, lab monitoring  Options provided:  -- Secondary hypercoagulable state in a patient with atrial fibrillation  -- Other - I will add my own diagnosis  -- Disagree - Not applicable / Not valid  -- Disagree - Clinically unable to determine / Unknown  -- Refer to Clinical Documentation Reviewer    PROVIDER RESPONSE TEXT:    This patient has secondary hypercoagulable state in a patient with atrial   fibrillation.    Query created by: Tricia Suggs on 3/5/2025 12:32 PM      Electronically signed by:  Colby Valenzuela MD 3/6/2025 1:47 PM          
Discharge paperwork gone over with patient and spouse. All questions answered. IV removed, telemetry removed. Patient transported to personal vehicle with no issues.  
Patient received sacramental anointing by a . If you are in need of  support, please call [phone]. If you are in need of a  after 6:30pm, please call the house supervisor for the on-call .      Katie Lovelace  [facility] Health Coordinator  [phone]   
Pt admitted to  4K23 from ED.     Complaints: Shortness of breath.      IV site free of s/s of infection or infiltration.     Vital signs obtained. Assessment and data collection initiated.     Swallow Screen completed and documented for all patients ages 45 and older. Passed    Policies and procedures for 4K explained. All questions answered with no further questions at this time. Fall prevention and safety brochure discussed with patient.  Bed alarm on. Call light in reach. Oriented to room.      
Spiritual Health History and Assessment/Progress Note  [facility]    (P) Spiritual/Emotional Needs,  ,  ,      Name: Ousmane Lockett MRN: [id number]    Age: 59 y.o.     Sex: male   Language: English   Confucianist: Mormon   Dyspnea     Date: 3/4/2025            Total Time Calculated: (P) 35 min              Spiritual Assessment began in [facility] ICU STEPDOWN TELEMETRY 4K        Referral/Consult From: (P) Nurse   Encounter Overview/Reason: (P) Spiritual/Emotional Needs  Service Provided For: (P) Patient and family together    Selina, Belief, Meaning:   Patient identifies as spiritual  Family/Friends identify as spiritual      Importance and Influence:  Patient has spiritual/personal beliefs that influence decisions regarding their health  Family/Friends have spiritual/personal beliefs that influence decisions regarding the patient's health    Community:  Patient is connected with a spiritual community  Family/Friends are connected with a spiritual community:    Assessment and Plan of Care:     Patient Interventions include: Facilitated expression of thoughts and feelings  Family/Friends Interventions include: Facilitated expression of thoughts and feelings    Patient Plan of Care: Spiritual Care available upon further referral  Family/Friends Plan of Care: Spiritual Care available upon further referral    Electronically signed by Chaplain Maura on 3/4/2025 at 8:44 AM    
TransDERmal Q72H    sodium chloride flush  5-40 mL IntraVENous 2 times per day     prochlorperazine, oxyCODONE HCl, acetaminophen, sodium chloride flush, sodium chloride, potassium chloride **OR** potassium alternative oral replacement **OR** potassium chloride, magnesium sulfate, ondansetron, polyethylene glycol  IV Drips/Infusions   sodium chloride       Home Medications  Medications Prior to Admission: fentaNYL (DURAGESIC) 75 MCG/HR, Place 1 patch onto the skin every 72 hours for 30 days. Max Daily Amount: 1 patch  acetaminophen (TYLENOL) 500 MG CAPS capsule, Take 1-2 capsules by mouth every 6 hours as needed  dexAMETHasone (DECADRON) 4 MG tablet, Take 1 tablet by mouth 2 times daily  folic acid (FOLVITE) 1 MG tablet,   Multiple Vitamins-Minerals (THERAPEUTIC-M) TABS, Take 1 tablet by mouth daily  prochlorperazine (COMPAZINE) 10 MG tablet, as needed (nausea)  polyethylene glycol (GLYCOLAX) 17 g packet, Take 1 packet by mouth daily as needed for Constipation  oxyCODONE HCl (OXY-IR) 10 MG immediate release tablet, Take 1 tablet by mouth every 4 hours as needed for Pain for up to 30 days. Intended supply: 30 days Max Daily Amount: 60 mg (Patient taking differently: Take 1.5 tablets by mouth every 4 hours as needed for Pain. Intended supply: 30 days)  metoprolol tartrate (LOPRESSOR) 50 MG tablet, Take 1 tablet by mouth 2 times daily  apixaban (ELIQUIS) 5 MG TABS tablet, Take 1 tablet by mouth 2 times daily  pantoprazole (PROTONIX) 40 MG tablet, Take 1 tablet by mouth every morning (before breakfast)  predniSONE (DELTASONE) 10 MG tablet, TAKE 1 TABLET DAILY  sirolimus (RAPAMUNE) 1 MG tablet, Take 1 tablet by mouth daily  polycarbophil (FIBERCON) 625 MG tablet, Take 1 tablet by mouth daily  Cholecalciferol (VITAMIN D3) 1.25 MG (48356 UT) CAPS, Take by mouth  furosemide (LASIX) 20 MG tablet, Take 1 tablet by mouth daily  Ascorbic Acid (VITAMIN C) 500 MG tablet, Take 2 tablets by mouth daily  psyllium ([facility] DAILY FIBER)

## 2025-03-07 PROBLEM — G89.3 CANCER RELATED PAIN: Status: ACTIVE | Noted: 2025-03-07

## 2025-03-07 PROBLEM — A08.11 NOROVIRUS: Status: ACTIVE | Noted: 2025-03-07

## 2025-03-07 PROBLEM — R19.7 DIARRHEA: Status: ACTIVE | Noted: 2025-03-07

## 2025-03-07 PROBLEM — A08.0 ROTAVIRUS ENTERITIS: Status: ACTIVE | Noted: 2025-03-07

## 2025-03-07 PROBLEM — B99.9 FEVER DUE TO INFECTION: Status: ACTIVE | Noted: 2025-03-07

## 2025-03-07 PROBLEM — Z51.5 PALLIATIVE CARE PATIENT: Status: ACTIVE | Noted: 2025-03-07

## 2025-03-07 LAB
ABO GROUP BLD: NORMAL
ANION GAP SERPL CALC-SCNC: 10 MEQ/L (ref 8–16)
BUN SERPL-MCNC: 14 MG/DL (ref 8–23)
CA-I BLD ISE-SCNC: 1.15 MMOL/L (ref 1.12–1.32)
CALCIUM SERPL-MCNC: 7.9 MG/DL (ref 8.8–10.2)
CHLORIDE SERPL-SCNC: 98 MEQ/L (ref 98–111)
CO2 SERPL-SCNC: 25 MEQ/L (ref 22–29)
CREAT SERPL-MCNC: 0.6 MG/DL (ref 0.7–1.2)
DEPRECATED RDW RBC AUTO: 69.4 FL (ref 35–45)
ERYTHROCYTE [DISTWIDTH] IN BLOOD BY AUTOMATED COUNT: 22.3 % (ref 11.5–14.5)
GFR SERPL CREATININE-BSD FRML MDRD: > 90 ML/MIN/1.73M2
GLUCOSE BLD STRIP.AUTO-MCNC: 121 MG/DL (ref 70–108)
GLUCOSE SERPL-MCNC: 95 MG/DL (ref 74–109)
HCT VFR BLD AUTO: 23.9 % (ref 42–52)
HCT VFR BLD AUTO: 24.1 % (ref 42–52)
HCT VFR BLD AUTO: 27.1 % (ref 42–52)
HGB BLD-MCNC: 6.8 GM/DL (ref 14–18)
HGB BLD-MCNC: 6.8 GM/DL (ref 14–18)
HGB BLD-MCNC: 7.8 GM/DL (ref 14–18)
IAT IGG-SP REAG SERPL QL: NORMAL
MAGNESIUM SERPL-MCNC: 2 MG/DL (ref 1.6–2.6)
MCH RBC QN AUTO: 24.6 PG (ref 26–33)
MCHC RBC AUTO-ENTMCNC: 28.2 GM/DL (ref 32.2–35.5)
MCV RBC AUTO: 87.3 FL (ref 80–94)
PHOSPHATE SERPL-MCNC: 2.3 MG/DL (ref 2.5–4.5)
PLATELET # BLD AUTO: 203 THOU/MM3 (ref 130–400)
PMV BLD AUTO: 9.1 FL (ref 9.4–12.4)
POTASSIUM SERPL-SCNC: 3.1 MEQ/L (ref 3.5–5.2)
RBC # BLD AUTO: 2.76 MILL/MM3 (ref 4.7–6.1)
RH BLD: NORMAL
SODIUM SERPL-SCNC: 133 MEQ/L (ref 135–145)
WBC # BLD AUTO: 7.3 THOU/MM3 (ref 4.8–10.8)

## 2025-03-07 PROCEDURE — 99233 SBSQ HOSP IP/OBS HIGH 50: CPT | Performed by: NURSE PRACTITIONER

## 2025-03-07 PROCEDURE — 83735 ASSAY OF MAGNESIUM: CPT

## 2025-03-07 PROCEDURE — 80048 BASIC METABOLIC PNL TOTAL CA: CPT

## 2025-03-07 PROCEDURE — 2580000003 HC RX 258: Performed by: NURSE PRACTITIONER

## 2025-03-07 PROCEDURE — 2500000003 HC RX 250 WO HCPCS: Performed by: NURSE PRACTITIONER

## 2025-03-07 PROCEDURE — 86900 BLOOD TYPING SEROLOGIC ABO: CPT

## 2025-03-07 PROCEDURE — 6360000002 HC RX W HCPCS

## 2025-03-07 PROCEDURE — 2500000003 HC RX 250 WO HCPCS

## 2025-03-07 PROCEDURE — 86923 COMPATIBILITY TEST ELECTRIC: CPT

## 2025-03-07 PROCEDURE — 84100 ASSAY OF PHOSPHORUS: CPT

## 2025-03-07 PROCEDURE — 36430 TRANSFUSION BLD/BLD COMPNT: CPT

## 2025-03-07 PROCEDURE — 6370000000 HC RX 637 (ALT 250 FOR IP): Performed by: NURSE PRACTITIONER

## 2025-03-07 PROCEDURE — 6370000000 HC RX 637 (ALT 250 FOR IP)

## 2025-03-07 PROCEDURE — 30233N1 TRANSFUSION OF NONAUTOLOGOUS RED BLOOD CELLS INTO PERIPHERAL VEIN, PERCUTANEOUS APPROACH: ICD-10-PCS | Performed by: NURSE PRACTITIONER

## 2025-03-07 PROCEDURE — 99223 1ST HOSP IP/OBS HIGH 75: CPT

## 2025-03-07 PROCEDURE — 86885 COOMBS TEST INDIRECT QUAL: CPT

## 2025-03-07 PROCEDURE — 82948 REAGENT STRIP/BLOOD GLUCOSE: CPT

## 2025-03-07 PROCEDURE — 85027 COMPLETE CBC AUTOMATED: CPT

## 2025-03-07 PROCEDURE — 1200000003 HC TELEMETRY R&B

## 2025-03-07 PROCEDURE — 85018 HEMOGLOBIN: CPT

## 2025-03-07 PROCEDURE — 82330 ASSAY OF CALCIUM: CPT

## 2025-03-07 PROCEDURE — 85014 HEMATOCRIT: CPT

## 2025-03-07 PROCEDURE — 86901 BLOOD TYPING SEROLOGIC RH(D): CPT

## 2025-03-07 PROCEDURE — P9040 RBC LEUKOREDUCED IRRADIATED: HCPCS

## 2025-03-07 PROCEDURE — 36415 COLL VENOUS BLD VENIPUNCTURE: CPT

## 2025-03-07 RX ORDER — OXYCODONE HYDROCHLORIDE 20 MG/1
20 TABLET ORAL EVERY 4 HOURS PRN
Qty: 180 TABLET | Refills: 0 | Status: SHIPPED | OUTPATIENT
Start: 2025-03-07 | End: 2025-03-10

## 2025-03-07 RX ORDER — OXYCODONE HYDROCHLORIDE 5 MG/1
20 TABLET ORAL EVERY 4 HOURS PRN
Status: DISCONTINUED | OUTPATIENT
Start: 2025-03-07 | End: 2025-03-08 | Stop reason: HOSPADM

## 2025-03-07 RX ORDER — METOPROLOL TARTRATE 25 MG/1
25 TABLET, FILM COATED ORAL 2 TIMES DAILY
Status: DISCONTINUED | OUTPATIENT
Start: 2025-03-07 | End: 2025-03-08 | Stop reason: HOSPADM

## 2025-03-07 RX ORDER — POTASSIUM CHLORIDE 1500 MG/1
40 TABLET, EXTENDED RELEASE ORAL PRN
Status: DISCONTINUED | OUTPATIENT
Start: 2025-03-07 | End: 2025-03-08 | Stop reason: HOSPADM

## 2025-03-07 RX ORDER — MAGNESIUM SULFATE IN WATER 40 MG/ML
2000 INJECTION, SOLUTION INTRAVENOUS PRN
Status: DISCONTINUED | OUTPATIENT
Start: 2025-03-07 | End: 2025-03-08 | Stop reason: HOSPADM

## 2025-03-07 RX ORDER — LOPERAMIDE HYDROCHLORIDE 2 MG/1
2 CAPSULE ORAL 4 TIMES DAILY PRN
Status: DISCONTINUED | OUTPATIENT
Start: 2025-03-07 | End: 2025-03-08 | Stop reason: HOSPADM

## 2025-03-07 RX ORDER — POTASSIUM CHLORIDE 7.45 MG/ML
10 INJECTION INTRAVENOUS PRN
Status: DISCONTINUED | OUTPATIENT
Start: 2025-03-07 | End: 2025-03-08 | Stop reason: HOSPADM

## 2025-03-07 RX ORDER — SODIUM CHLORIDE 9 MG/ML
INJECTION, SOLUTION INTRAVENOUS PRN
Status: DISCONTINUED | OUTPATIENT
Start: 2025-03-07 | End: 2025-03-08 | Stop reason: HOSPADM

## 2025-03-07 RX ADMIN — SODIUM CHLORIDE, PRESERVATIVE FREE 10 ML: 5 INJECTION INTRAVENOUS at 21:55

## 2025-03-07 RX ADMIN — POTASSIUM CHLORIDE 40 MEQ: 1500 TABLET, EXTENDED RELEASE ORAL at 10:24

## 2025-03-07 RX ADMIN — SODIUM CHLORIDE, PRESERVATIVE FREE 10 ML: 5 INJECTION INTRAVENOUS at 08:41

## 2025-03-07 RX ADMIN — SIROLIMUS 1 MG: 1 TABLET ORAL at 08:40

## 2025-03-07 RX ADMIN — PANTOPRAZOLE SODIUM 40 MG: 40 TABLET, DELAYED RELEASE ORAL at 05:33

## 2025-03-07 RX ADMIN — METOPROLOL TARTRATE 50 MG: 50 TABLET, FILM COATED ORAL at 08:40

## 2025-03-07 RX ADMIN — OXYCODONE HYDROCHLORIDE 20 MG: 5 TABLET ORAL at 22:16

## 2025-03-07 RX ADMIN — OXYCODONE HYDROCHLORIDE 20 MG: 5 TABLET ORAL at 17:53

## 2025-03-07 RX ADMIN — FOLIC ACID 1 MG: 1 TABLET ORAL at 08:40

## 2025-03-07 RX ADMIN — OXYCODONE HYDROCHLORIDE 20 MG: 5 TABLET ORAL at 13:44

## 2025-03-07 RX ADMIN — PREDNISONE 10 MG: 10 TABLET ORAL at 08:40

## 2025-03-07 RX ADMIN — ACETAMINOPHEN 650 MG: 325 TABLET ORAL at 16:10

## 2025-03-07 RX ADMIN — SODIUM PHOSPHATE, MONOBASIC, MONOHYDRATE AND SODIUM PHOSPHATE, DIBASIC ANHYDROUS 15 MMOL: 142; 276 INJECTION, SOLUTION INTRAVENOUS at 22:16

## 2025-03-07 RX ADMIN — Medication 1 TABLET: at 08:40

## 2025-03-07 RX ADMIN — OXYCODONE 10 MG: 5 TABLET ORAL at 04:27

## 2025-03-07 RX ADMIN — OXYCODONE HYDROCHLORIDE AND ACETAMINOPHEN 1000 MG: 500 TABLET ORAL at 08:40

## 2025-03-07 ASSESSMENT — PAIN SCALES - GENERAL
PAINLEVEL_OUTOF10: 6
PAINLEVEL_OUTOF10: 7
PAINLEVEL_OUTOF10: 7
PAINLEVEL_OUTOF10: 6
PAINLEVEL_OUTOF10: 6
PAINLEVEL_OUTOF10: 3
PAINLEVEL_OUTOF10: 9

## 2025-03-07 ASSESSMENT — PAIN DESCRIPTION - LOCATION
LOCATION: BACK

## 2025-03-07 ASSESSMENT — PAIN DESCRIPTION - DESCRIPTORS
DESCRIPTORS: STABBING
DESCRIPTORS: SHARP

## 2025-03-07 NOTE — PROGRESS NOTES
Patient received sacramental anointing by a . If you are in need of  support, please call [phone]. If you are in need of a  after 6:30pm, please call the house supervisor for the on-call .     03/07/25 1601   Encounter Summary   Encounter Overview/Reason Spiritual/Emotional Needs   Service Provided For Patient and family together   Referral/Consult From [facility]   Support System Family members   Last Encounter  03/07/25  (Anointed)   Complexity of Encounter Low   Begin Time 1410   End Time  1416   Total Time Calculated 6 min   Spiritual/Emotional needs   Type Spiritual Support   Rituals, Rites and Sacraments   Type Anointing   Assessment/Intervention/Outcome   Assessment Calm   Intervention Empowerment

## 2025-03-07 NOTE — CARE COORDINATION
03/07/25 1210   Readmission Assessment   Number of Days since last admission? 1-7 days   Previous Disposition Home with Family   Who is being Interviewed Patient   What was the patient's/caregiver's perception as to why they think they needed to return back to the hospital? Other (Comment)  (diarrhea; weakness and fall)   Did you visit your Primary Care Physician after you left the hospital, before you returned this time? No   Why weren't you able to visit your PCP? Other (Comment)  (Readmit too soon)   Did you see a specialist, such as Cardiac, Pulmonary, Orthopedic Physician, etc. after you left the hospital? No   Who advised the patient to return to the hospital? Self-referral   Does the patient report anything that got in the way of taking their medications? No   In our efforts to provide the best possible care to you and others like you, can you think of anything that we could have done to help you after you left the hospital the first time, so that you might not have needed to return so soon? Other (Comment)  (\"I really don't know\" Patient felt ready for discharge last admit but states things took a fast turn downhill once he got home)

## 2025-03-07 NOTE — PROGRESS NOTES
[facility]  PHYSICAL THERAPY MISSED TREATMENT NOTE  [facility] MED SURG 8AB    Date: 3/7/2025  Patient Name: Ousmane Lockett        MRN: [id number]   : 1965  (59 y.o.)  Gender: male                REASON FOR MISSED TREATMENT:  Missed Treat.  Per review of chart, hemoglobin is 6.8 this AM, will check back as able.

## 2025-03-07 NOTE — PROGRESS NOTES
Hospitalist Progress Note    Patient:  Ousmane Lockett      Unit/Bed:8A-16/016-A    YOB: 1965    MRN: [id number]       Acct: [id number]     PCP: Daniel Barbosa MD    Date of Admission: 3/6/2025    Assessment/Plan:    Mechanical fall at home--secondary to weakness; CT C-spine showed no fracture  Acute blood loss anemia--possibly secondary to chemotherapy, consent for blood transfusion products obtained on 3/7/2025, will transfuse 1 unit packed red blood cells and recheck hemoglobin 1 hour after, placed Eliquis on hold  Sepsis (POA)--possibly secondary to #4, fever possibly secondary to cancer origin; has been afebrile for 24 hours, monitor closely  Diarrhea secondary to norovirus and rotavirus--C. difficile negative, improved per patient, symptomatic treatment; will obtain urinalysis; chest x-ray shows unchanged moderate right loculated effusion with basilar opacity  Hypokalemia--likely secondary to GI losses from diarrhea, replace per protocol and check in the morning  Hypophosphatemia--likely secondary to poor oral intake, replace per protocol and check in the morning  Hyponatremia, chronic--stable  Loculated right sided pleural effusion (POA)--has a Pleurx catheter in place; wife has been draining this 3 times weekly and is not getting much fluid out  Bilateral lower extremity edema--stable per wife, with the extreme diarrhea we will hold his diuretics and reevaluate in the morning, encourage lower extremity elevation  Non-small cell lung cancer with bone metastasis--follows with [facility] radiation oncology; last chemotherapy was February 25  GERD--on Protonix  Chronic normocytic anemia--please see #2  History of renal transplant--on rejection medication  PAF with probable secondary hypercoagulable state--Eliquis on hold with #2  History of upper extremity DVT--Eliquis on hold  History non-Hodgkin's lymphoma, diagnosed in 1995  Cancer related pain--will have

## 2025-03-07 NOTE — FLOWSHEET NOTE
03/07/25 1225 03/07/25 1231   Vital Signs   Pulse 81 80   Respirations 16  --    BP (!) 96/54 (!) 92/48   MAP (Calculated) 68 63   BP Location Right upper arm Right upper arm   BP Method Automatic Manual     Pt Hypotensive after blood administration, Sushma NP made aware face to face, med orders adjusted.

## 2025-03-07 NOTE — PROGRESS NOTES
Occupational Therapy  [facility]  OCCUPATIONAL THERAPY MISSED TREATMENT NOTE  [facility] MED SURG 8AB  8A-16/016-A      Date: 3/7/2025  Patient Name: Ousmane Lockett        CSN: [id number]   : 1965  (59 y.o.)  Gender: male   Referring Practitioner: Remy Delgado MD          REASON FOR MISSED TREATMENT: Hold Treatment per Nursing Pt has low hgb and will be getting a unit of PRBCs. Will attempt again as time allows.

## 2025-03-07 NOTE — PLAN OF CARE
Problem: Chronic Conditions and Co-morbidities  Goal: Patient's chronic conditions and co-morbidity symptoms are monitored and maintained or improved  3/6/2025 2227 by Callie Rubio RN  Outcome: Progressing  Flowsheets (Taken 3/6/2025 2000)  Care Plan - Patient's Chronic Conditions and Co-Morbidity Symptoms are Monitored and Maintained or Improved: Monitor and assess patient's chronic conditions and comorbid symptoms for stability, deterioration, or improvement  3/6/2025 1003 by Cecelia Garza RN  Outcome: Progressing     Problem: Discharge Planning  Goal: Discharge to home or other facility with appropriate resources  3/6/2025 2227 by Callie Rubio RN  Outcome: Progressing  Flowsheets (Taken 3/6/2025 2000)  Discharge to home or other facility with appropriate resources: Identify barriers to discharge with patient and caregiver  3/6/2025 1003 by Cecelia Garza RN  Outcome: Progressing     Problem: Safety - Adult  Goal: Free from fall injury  3/6/2025 2227 by Callie Rubio RN  Outcome: Progressing  3/6/2025 1003 by Cecelia Garza RN  Outcome: Progressing     Problem: ABCDS Injury Assessment  Goal: Absence of physical injury  3/6/2025 2227 by Callie Rubio RN  Outcome: Progressing  3/6/2025 1003 by Cecelia Garza RN  Outcome: Progressing

## 2025-03-07 NOTE — PLAN OF CARE
Ousmane Lockett was evaluated today and a DME order was entered for a wheeled walker because he requires this to successfully complete daily living tasks of toileting, personal cares, and ambulating.  A wheeled walker is necessary due to the patient's unsteady gait, upper body weakness, and inability to  an ambulation device; and he can ambulate only by pushing a walker instead of a lesser assistive device such as a cane, crutch, or standard walker.  The need for this equipment was discussed with the patient and he understands and is in agreement.

## 2025-03-07 NOTE — TELEPHONE ENCOUNTER
Schedule for stress test on 4/4/25 , arrival time 7 am     Call to pt's wife, Monica (on HIPAA) date , time and instructions reviewed with pt.

## 2025-03-07 NOTE — ED PROVIDER NOTES
maternal grandmother is . He indicated that his maternal grandfather is . He indicated that his paternal grandmother is . He indicated that his paternal grandfather is .       SOCIAL HISTORY       Social History     Tobacco Use    Smoking status: Never    Smokeless tobacco: Never   Vaping Use    Vaping status: Never Used   Substance Use Topics    Alcohol use: Not Currently     Alcohol/week: 1.0 standard drink of alcohol     Types: 1 Shots of liquor per week     Comment: very rare use.     Drug use: No       PHYSICAL EXAM       Constitutional:  Non-toxic appearing.  Alert and in no acute distress.      Head: Normocephalic, atraumatic.    Eyes:  PERRLA, No conjunctival injection.  No nystagmus.  EOMI intact.    ENT.  Mucous membranes moist.      Neck:  Neck soft and supple.  No tracheal tugging.  No palpable lymphadenopathy.      Chest:  Symmetric chest rise  No tenderness.  No rash.    Respiratory:  Lungs clear to auscultation throughout.  No rales, rhonchi, or wheezing.  No respiratory distress.    Heart:  Regular rate.  Regular rhythm.  Normal S1/S2.    Abdomen:  Soft, non-tender.  No guarding, rigidity, or rebound tenderness.  No palpable mass.  No organomegaly.  BS active in all 4 quadrants.    :  Deferred.    Skin:  Warm and dry.  No rashes or lesions.    Back:  Normal range of motion.  No tenderness to palpation.    Musculoskeletal:  Moves all extremities.  No joint tenderness or deformities.  2+ pretibial and pedal edema to the left lower extremity, 3+ pedal and pretibial edema noted to the right lower extremity.  No vascular defects.    Neuro:  GCS 15.  Awake, alert, and oriented x 4.  Speech clear and concise.  No lateralizing deficits.    Psych:  Normal affect.  Normal mood.  Cooperative behavior.      Additional Vital Signs:  Vitals:    25 1913   BP: 105/63   Pulse: 88   Resp: 12   Temp:    SpO2: 98%         FORMAL DIAGNOSTIC RESULTS     RADIOLOGY: Interpretation per 
several levels, as described above. 3.  Mild decrease in height of thoracic vertebral bodies at multiple levels, appears to be osteopenic rather than pathological in origin. Electronically Signed By: Jamie Simons MD on 2/10/2025 4:43 PM    XR SHOULDER RIGHT (MIN 2 VIEWS)    Result Date: 2/7/2025  EXAM: XR SHOULDER RIGHT 4 VIEWS, 02/07/2025 14:18 PM CLINICAL INDICATIONS:  s/p oncologic reverse total shoulder RELEVANT CLINICAL HISTORY:  Z96.611:Status post reverse total replacement of right shoulder COMPARISON: November 1, 2024 FINDINGS: 4 images obtained. Soft Tissue:  No soft tissue swelling evident. Bone:  Redemonstration of an oncologic reverse right total shoulder arthroplasty. The scapular implant appears well seated. The humeral implant appears well seated. No hardware failure or acute osseous abnormality. There is anatomic alignment of the hardware. Joint:  The acromioclavicular joint appears stable.    IMPRESSION:   Stable oncologic reverse right total shoulder arthroplasty. Electronically Signed By: Graeme Barriga M.D. on 2/7/2025 2:22 PM        DIAGNOSIS  1. Loculated pleural effusion    2. Shortness of breath    3. Peripheral edema    4. Metastatic non-small cell lung cancer (HCC)    5. Anemia, unspecified type    6. History of renal transplant           DISPOSITION/PLAN  PATIENT REFERRED TO:  Daniel Barbosa MD  [address] [place] [address]  [phone]    Follow up in 1 week(s)  LM to schedule appointment. The office will contact the patient to schedule appointment.    DISCHARGE MEDICATIONS:  Discharge Medication List as of 3/5/2025  2:15 PM            JENSEN Dutta CNP, Kristy J, APRN - CNP  03/07/25 0701

## 2025-03-07 NOTE — CONSULTS
at   least 8.9 x 2.6 x 1.5 cm in size..               **This report has been created using voice recognition software. It may contain   minor errors which are inherent in voice recognition technology.**            Electronically signed by Dr. Christiano Abdullahi      XR CHEST PORTABLE   Final Result   Unchanged moderate right loculated effusion with basilar opacity.      This document has been electronically signed by: Jose Duke MD on    03/06/2025 06:38 AM           Palliative Performance Scale   60%  Ambulation reduced; Significant disease; Can't do hobbies/housework; intake normal or reduced; occasional assist; LOC full/confusion    Assessment and Plan   Ousmane Lockett is a 59 y.o. who is admitted to [facility] for Loculated pleural effusion. Palliative care is consulted for Continuity of Care.  The patient was recently increased to oxycodone IR 20 mg every 4 hours as needed for breakthrough pain not controlled on his fentanyl patch.  We will reorder this today as the current order is 10 mg.  He reports this has worked well to control his pain at home.  He is still feeling very fatigued and tired, he reports that his appetite has been very poor over the last several days.  We will consult the dietitian for supplements as he does use these at home to help with his appetite.  His diarrhea has subsided.  We did discuss his CODE STATUS, he is opting to remain a full code at this time.  I did send his oxycodone IR 20 mg to his pharmacy of choice as he is needing a refill of this, in case he gets discharged over the weekend.  Palliative Care will continue to follow the patient while they are hospitalized. Patient will continue to follow with palliative care in the palliative care clinic on discharge.         Principal Problem:    Loculated pleural effusion  Active Problems:    Metastatic non-small cell lung cancer (HCC)    Fever due to infection    Norovirus    Rotavirus enteritis    Diarrhea

## 2025-03-07 NOTE — CARE COORDINATION
Case Management Assessment Initial Evaluation    Date/Time of Evaluation: 3/7/2025 2:07 PM  Assessment Completed by: Angélica Barker RN    If patient is discharged prior to next notation, then this note serves as note for discharge by case management.    Patient Name: Ousmane Lockett                   YOB: 1965  Diagnosis: Generalized weakness [R53.1]  History of lung cancer [Z85.118]  Loculated pleural effusion [J90]  Fall, initial encounter [W19.XXXA]  Fever due to infection [B99.9]  Diarrhea [R19.7]                   Date / Time: 3/6/2025  4:16 AM  [facility]     Patient Admission Status: Inpatient   Readmission Risk Low 0-14, Mod 15-19), High > 20: Readmission Risk Score: 31.3    Current PCP: Daniel Barbosa MD  Health Care Decision Makers:   Primary Decision Maker: Monica Lockett - [facility] - [phone]    Additional Case Management Notes: To ED w/ diarrhea, weakness and fall at home. Hospitalist following. PT/OT. Telemetry. Eliquis held. Fentanyl patch. Roxicodone prn. Prednisone. K 3.1.Phos 2.3. Hgb 6.8- 1 unit PRBC.     Procedures: none    Imaging:   3/6 CXR: Unchanged moderate right loculated effusion with basilar opacity.   3/6 CT Cervical: Straightening of the normal cervical lordosis with superimposed cervical spondylosis at C3-4 and C5-6. 2. No acute fracture. 3. Lipoma in the soft soft tissues posteriorly on the left side measuring at least 8.9 x 2.6 x 1.5 cm in size.    Patient Goals/Plan/Treatment Preferences: Met w/ Ousmane and his wife. Verified insurance and PCP. He is independent; his wife assists as needed. She provides all transportation and manages patient's PleurX cath. Agreeable to RW as recommended by OT; prefers to obtain from [facility]; call placed to [facility] for delivery to bedside today. Was doing OP PT/OT at the [facility]; however it is currently on hold until he is better. Ousmane and his wife don't feel he would benefit from . Plan is for Ousmane to return

## 2025-03-08 VITALS
WEIGHT: 186.07 LBS | TEMPERATURE: 98 F | RESPIRATION RATE: 16 BRPM | HEIGHT: 69 IN | OXYGEN SATURATION: 95 % | BODY MASS INDEX: 27.56 KG/M2 | DIASTOLIC BLOOD PRESSURE: 67 MMHG | HEART RATE: 103 BPM | SYSTOLIC BLOOD PRESSURE: 119 MMHG

## 2025-03-08 LAB
ALBUMIN SERPL BCG-MCNC: 2.1 G/DL (ref 3.4–4.9)
ALP SERPL-CCNC: 137 U/L (ref 40–129)
ALT SERPL W/O P-5'-P-CCNC: 21 U/L (ref 10–50)
ANION GAP SERPL CALC-SCNC: 8 MEQ/L (ref 8–16)
AST SERPL-CCNC: 36 U/L (ref 10–50)
BACTERIA BLD AEROBE CULT: NORMAL
BILIRUB SERPL-MCNC: < 0.2 MG/DL (ref 0.3–1.2)
BUN SERPL-MCNC: 10 MG/DL (ref 8–23)
CALCIUM SERPL-MCNC: 7.7 MG/DL (ref 8.8–10.2)
CHLORIDE SERPL-SCNC: 100 MEQ/L (ref 98–111)
CO2 SERPL-SCNC: 25 MEQ/L (ref 22–29)
CREAT SERPL-MCNC: 0.5 MG/DL (ref 0.7–1.2)
DEPRECATED RDW RBC AUTO: 65.8 FL (ref 35–45)
ERYTHROCYTE [DISTWIDTH] IN BLOOD BY AUTOMATED COUNT: 21.2 % (ref 11.5–14.5)
GFR SERPL CREATININE-BSD FRML MDRD: > 90 ML/MIN/1.73M2
GLUCOSE BLD STRIP.AUTO-MCNC: 98 MG/DL (ref 70–108)
GLUCOSE SERPL-MCNC: 89 MG/DL (ref 74–109)
HCT VFR BLD AUTO: 25.7 % (ref 42–52)
HGB BLD-MCNC: 7.4 GM/DL (ref 14–18)
MAGNESIUM SERPL-MCNC: 1.9 MG/DL (ref 1.6–2.6)
MCH RBC QN AUTO: 25.3 PG (ref 26–33)
MCHC RBC AUTO-ENTMCNC: 28.8 GM/DL (ref 32.2–35.5)
MCV RBC AUTO: 88 FL (ref 80–94)
PHOSPHATE SERPL-MCNC: 2.6 MG/DL (ref 2.5–4.5)
PLATELET # BLD AUTO: 180 THOU/MM3 (ref 130–400)
PMV BLD AUTO: 9.3 FL (ref 9.4–12.4)
POTASSIUM SERPL-SCNC: 3.5 MEQ/L (ref 3.5–5.2)
PROT SERPL-MCNC: 4.1 G/DL (ref 6.4–8.3)
RBC # BLD AUTO: 2.92 MILL/MM3 (ref 4.7–6.1)
SIROLIMUS BLD-MCNC: 5.3 NG/ML
SODIUM SERPL-SCNC: 133 MEQ/L (ref 135–145)
WBC # BLD AUTO: 7.6 THOU/MM3 (ref 4.8–10.8)

## 2025-03-08 PROCEDURE — 6360000002 HC RX W HCPCS

## 2025-03-08 PROCEDURE — 85027 COMPLETE CBC AUTOMATED: CPT

## 2025-03-08 PROCEDURE — 99239 HOSP IP/OBS DSCHRG MGMT >30: CPT | Performed by: NURSE PRACTITIONER

## 2025-03-08 PROCEDURE — 82948 REAGENT STRIP/BLOOD GLUCOSE: CPT

## 2025-03-08 PROCEDURE — 6370000000 HC RX 637 (ALT 250 FOR IP)

## 2025-03-08 PROCEDURE — 83735 ASSAY OF MAGNESIUM: CPT

## 2025-03-08 PROCEDURE — 84100 ASSAY OF PHOSPHORUS: CPT

## 2025-03-08 PROCEDURE — 80053 COMPREHEN METABOLIC PANEL: CPT

## 2025-03-08 PROCEDURE — 2500000003 HC RX 250 WO HCPCS

## 2025-03-08 PROCEDURE — 36415 COLL VENOUS BLD VENIPUNCTURE: CPT

## 2025-03-08 PROCEDURE — 6370000000 HC RX 637 (ALT 250 FOR IP): Performed by: NURSE PRACTITIONER

## 2025-03-08 RX ORDER — LOPERAMIDE HYDROCHLORIDE 2 MG/1
2 CAPSULE ORAL 4 TIMES DAILY PRN
COMMUNITY
Start: 2025-03-08 | End: 2025-03-18

## 2025-03-08 RX ORDER — METOPROLOL TARTRATE 50 MG
25 TABLET ORAL 2 TIMES DAILY
Qty: 180 TABLET | Refills: 3 | Status: SHIPPED
Start: 2025-03-08

## 2025-03-08 RX ORDER — POTASSIUM CHLORIDE 1500 MG/1
20 TABLET, EXTENDED RELEASE ORAL DAILY
Qty: 30 TABLET | Refills: 0 | Status: SHIPPED | OUTPATIENT
Start: 2025-03-08

## 2025-03-08 RX ADMIN — OXYCODONE HYDROCHLORIDE AND ACETAMINOPHEN 1000 MG: 500 TABLET ORAL at 08:51

## 2025-03-08 RX ADMIN — POTASSIUM BICARBONATE 40 MEQ: 782 TABLET, EFFERVESCENT ORAL at 08:49

## 2025-03-08 RX ADMIN — Medication 1 TABLET: at 08:51

## 2025-03-08 RX ADMIN — PREDNISONE 10 MG: 10 TABLET ORAL at 08:51

## 2025-03-08 RX ADMIN — SODIUM CHLORIDE, PRESERVATIVE FREE 10 ML: 5 INJECTION INTRAVENOUS at 02:02

## 2025-03-08 RX ADMIN — FOLIC ACID 1 MG: 1 TABLET ORAL at 08:52

## 2025-03-08 RX ADMIN — PANTOPRAZOLE SODIUM 40 MG: 40 TABLET, DELAYED RELEASE ORAL at 06:44

## 2025-03-08 RX ADMIN — SIROLIMUS 1 MG: 1 TABLET ORAL at 08:52

## 2025-03-08 RX ADMIN — OXYCODONE HYDROCHLORIDE 20 MG: 5 TABLET ORAL at 02:01

## 2025-03-08 RX ADMIN — METOPROLOL TARTRATE 25 MG: 25 TABLET, FILM COATED ORAL at 08:52

## 2025-03-08 RX ADMIN — OXYCODONE HYDROCHLORIDE 20 MG: 5 TABLET ORAL at 06:44

## 2025-03-08 ASSESSMENT — PAIN SCALES - WONG BAKER
WONGBAKER_NUMERICALRESPONSE: NO HURT

## 2025-03-08 ASSESSMENT — PAIN SCALES - GENERAL
PAINLEVEL_OUTOF10: 4
PAINLEVEL_OUTOF10: 4
PAINLEVEL_OUTOF10: 0

## 2025-03-08 NOTE — PROGRESS NOTES
Comprehensive Nutrition Assessment    Type and Reason for Visit:  Initial, Consult (poor appetite/ intake, oral nutrition supplements, hypoalbuminemia with volume overload)    Nutrition Recommendations/Plan:   Continue current diet per Provider and encourage intake at best efforts   Modify ONS to Ensure Plus TID  MVI as ordered      Malnutrition Assessment:  Malnutrition Status:  Moderate malnutrition (03/08/25 1027)    Context:  Chronic Illness     Findings of the 6 clinical characteristics of malnutrition:  Energy Intake:  75% or less estimated energy requirements for 1 month or longer  Weight Loss:   (reports ~10% loss 4 months ago; difficult to asses due to edema)     Body Fat Loss:  Mild body fat loss Orbital   Muscle Mass Loss:  Mild muscle mass loss Temples (temporalis), Clavicles (pectoralis & deltoids)  Fluid Accumulation:  Mild Extremities   Strength:  Not Performed    Nutrition Assessment:     Pt. moderately malnourished AEB criteria as listed above.  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        Nutrition Related Findings:    Pt. Report/Treatments/Miscellaneous: Patient seen with wife and reports ongoing poor appetite since November 2024 but know importance of nutrition so eating at best efforts and intake ~50-65% of his typical, denies nausea, dislikes Mightyshake, agrees to trial Ensure, reviewed good protein sources  GI Status: BM 3/7- reports diarrhea has resolved   Pertinent Labs: sodium 133, potassium 3.5, BUN 10, Creatinine 0.5, glucose 89  Pertinent Meds: vitamin C, folvite, MVI, deltasone, prn  Nutrition Focused Physical Findings, Skin, Weight, GI Status, Fluid Status or Edema    Discharge Planning:    Continue current diet     Sameera Rowe RD, LD  Contact: [phone]

## 2025-03-08 NOTE — DISCHARGE INSTRUCTIONS
Please follow with palliative care as already set  please follow up with oncology for chemo next Friday with labs before    Please monitor your blood pressure and heart rate closely    Please elevate your left arm and both legs

## 2025-03-08 NOTE — PLAN OF CARE
Problem: Pain  Goal: Verbalizes/displays adequate comfort level or baseline comfort level  Outcome: Progressing  Flowsheets (Taken 3/7/2025 2145)  Verbalizes/displays adequate comfort level or baseline comfort level: Administer analgesics based on type and severity of pain and evaluate response     Problem: Respiratory - Adult  Goal: Achieves optimal ventilation and oxygenation  Outcome: Progressing  Flowsheets (Taken 3/8/2025 0652)  Achieves optimal ventilation and oxygenation:   Assess for changes in respiratory status   Assess for changes in mentation and behavior   Position to facilitate oxygenation and minimize respiratory effort   Encourage broncho-pulmonary hygiene including cough, deep breathe, incentive spirometry   Assess and instruct to report shortness of breath or any respiratory difficulty     Problem: Cardiovascular - Adult  Goal: Maintains optimal cardiac output and hemodynamic stability  Outcome: Progressing  Flowsheets (Taken 3/8/2025 0652)  Maintains optimal cardiac output and hemodynamic stability:   Monitor blood pressure and heart rate   Assess for signs of decreased cardiac output   Administer vasoactive medications as ordered  Goal: Absence of cardiac dysrhythmias or at baseline  Outcome: Progressing  Flowsheets (Taken 3/8/2025 0652)  Absence of cardiac dysrhythmias or at baseline:   Monitor cardiac rate and rhythm   Assess for signs of decreased cardiac output   Administer antiarrhythmia medication and electrolyte replacement as ordered     Problem: Skin/Tissue Integrity - Adult  Goal: Incisions, wounds, or drain sites healing without S/S of infection  Outcome: Progressing  Flowsheets (Taken 3/8/2025 0652)  Incisions, Wounds, or Drain Sites Healing Without Sign and Symptoms of Infection:   TWICE DAILY: Assess and document dressing/incision, wound bed, drain sites and surrounding tissue   Initiate isolation precautions as appropriate     Problem: Musculoskeletal - Adult  Goal: Return ADL

## 2025-03-08 NOTE — DISCHARGE SUMMARY
Hospital Medicine Discharge Summary      Patient Identification:   Ousmane Lockett   : 1965  MRN: [id number]   Account: [id number]      Patient's PCP: Daniel Barbosa MD    Admit Date: 3/6/2025     Discharge Date:   3/8/2025    Admitting Physician: JENSEN To CNP     Discharging Nurse Practitioner: JENSEN To CNP     Discharge Diagnoses with Assessment/Plan:  Mechanical fall at home--secondary to weakness; CT C-spine showed no fracture  Acute blood loss anemia--possibly secondary to chemotherapy,  transfused 1 unit packed red blood cells on 3/7 with improvement of hemoglobin to 7.8, current hemoglobin at 7.4   Sepsis (POA)--possibly secondary to #4, fever possibly secondary to cancer origin; has been afebrile for 24 hours, offers no complaints  Diarrhea secondary to norovirus and rotavirus--C. difficile negative, diarrhea has resolved, symptomatic treatment; Imodium as needed;  urinalysis ordered but not collected however patient did not have any symptom; chest x-ray shows unchanged moderate right loculated effusion with basilar opacity  Hypokalemia--likely secondary to GI losses from diarrhea, replaced per protocol; stable, will add potassium to regimen at home since he is on Lasix  Hypophosphatemia--likely secondary to poor oral intake, replaced per protocol with resolution  Hyponatremia, chronic--stable  Loculated right sided pleural effusion (POA)--has a Pleurx catheter in place; wife has been draining this 3 times weekly and is not getting much fluid out; drained on 3/7/2025  Bilateral lower extremity edema--stable per wife, encourage lower extremity elevation; resume Lasix, had a Doppler that showed negative DVTs a few days ago  Non-small cell lung cancer with bone metastasis--follows with [facility] radiation oncology; last chemotherapy was  and is due for chemotherapy on   GERD--on Protonix  Chronic normocytic anemia--please  as loculated pleural effusion.     The patient and patient's wife were seen and evaluated at the bedside in the emergency department on 03/06/2025.  Patient's wife states that they arrived home at 1630 and the patient slept for the rest of the evening.  He awakened at 2345 with new onset diarrhea.  Patient estimates that he had 5 bowel movements prior to returning to the ER.  He denied any dyschezia or hematochezia or melena.  Patient states that the stools were predominantly liquid with a small formed components that were typically greenish/brownish in coloration.  The patient denies any change in urination.  While attempting to ambulate back from the restroom the patient slipped out of bed and fell on the ground.  Secondary to an inability to get up off the floor, EMS was contacted.     Review of systems is positive for lightheadedness, dizziness, shortness of breath with exertion, bilateral lower extremity edema.  Review of systems is negative for loss of consciousness, head trauma, room spinning sensation, vision changes, headache, cough, sputum production, chest pain/tightness/pressure, fever, chills, abdominal pain/cramping, paresthesias of the upper and lower extremities.     The emergency department temperature 102.8, respirations 28, pulse 111, blood pressure 136/64, SpO2 93% on room air.     BMP sodium 133, potassium 4.2, chloride 94, bicarb 25, BUN 14, creatinine 0.7, anion gap 14, lactic acid 1.6, glucose 162, calcium 8.0, calculated osmolality 270.4.     LFT albumin 2.7, alkaline phosphatase 194, ALT 27, AST 44, total bilirubin 0.3, total protein 5.1.     CBC WBC 11.6, Hb 8.1, HCT 28.4, MCV 87.4, platelet count 231.     CXR showed no changes in previously identified moderate right loculated effusion per interpreting radiologist.     CT cervical spine without contrast showed no evidence of acute fracture per interpreting radiologist.     The emergency department, the patient received 2 L of normal

## 2025-03-09 ENCOUNTER — PATIENT MESSAGE (OUTPATIENT)
Dept: PALLATIVE CARE | Age: 60
End: 2025-03-09

## 2025-03-09 DIAGNOSIS — C79.51 METASTATIC CARCINOMA INVOLVING BONE WITH UNKNOWN PRIMARY SITE (HCC): ICD-10-CM

## 2025-03-09 DIAGNOSIS — C34.90 NON-SMALL CELL LUNG CANCER, UNSPECIFIED LATERALITY (HCC): ICD-10-CM

## 2025-03-09 DIAGNOSIS — Z51.5 PALLIATIVE CARE PATIENT: ICD-10-CM

## 2025-03-09 DIAGNOSIS — G89.3 CANCER RELATED PAIN: ICD-10-CM

## 2025-03-09 DIAGNOSIS — C80.1 METASTATIC CARCINOMA INVOLVING BONE WITH UNKNOWN PRIMARY SITE (HCC): ICD-10-CM

## 2025-03-09 LAB
BACTERIA BLD AEROBE CULT: NORMAL

## 2025-03-10 RX ORDER — OXYCODONE HYDROCHLORIDE 20 MG/1
20 TABLET ORAL EVERY 4 HOURS PRN
Qty: 180 TABLET | Refills: 0 | Status: SHIPPED | OUTPATIENT
Start: 2025-03-10 | End: 2025-04-09

## 2025-03-10 NOTE — TELEPHONE ENCOUNTER
This prescription was sent to the [place] pharmacy listed for 180 tablets on March 7th. I did resend it this morning, but they should've had this over the weekend. We can do whichever type of visit is easiest for Ousmane. Thanks!

## 2025-03-11 ENCOUNTER — HOSPITAL ENCOUNTER (OUTPATIENT)
Dept: RADIATION ONCOLOGY | Age: 60
Discharge: HOME | End: 2025-03-11
Payer: COMMERCIAL

## 2025-03-11 DIAGNOSIS — Z94.0 KIDNEY TRANSPLANT RECIPIENT: Primary | ICD-10-CM

## 2025-03-11 LAB — BACTERIA BLD AEROBE CULT: NORMAL

## 2025-03-11 PROCEDURE — 77295 3-D RADIOTHERAPY PLAN: CPT | Performed by: RADIOLOGY

## 2025-03-11 PROCEDURE — 77300 RADIATION THERAPY DOSE PLAN: CPT | Performed by: RADIOLOGY

## 2025-03-11 PROCEDURE — 77334 RADIATION TREATMENT AID(S): CPT | Performed by: RADIOLOGY

## 2025-03-11 NOTE — PROGRESS NOTES
Physician Progress Note      PATIENT:               MICHEAL AGUILAR  CSN #:                  [id number]  :                       1965  ADMIT DATE:       3/6/2025 4:16 AM  DISCH DATE:        3/8/2025 11:57 AM  RESPONDING  PROVIDER #:        Sushma Jimenez CNP          QUERY TEXT:    Patient admitted3/6 thru 3/8  with Fever, Rotavirus, Norovirus. Noted   documentation of sepsis in IM  progress notes and DC summary. Lactic acid   obtained, blood cultures collected and IV Antibiotics provided on 3/6 -   Maxipime, Zosyn, Vancomycin.  Please provide additional treatment to support   sepsis diagnosis Or please document if the diagnosis of sepsis has been ruled   out after further study    The medical record reflects the following:  Risk Factors: patient with Rotavirus, Norovirus, known history of Malignancy,   weakness, loculated pleural effusion with Drain  Clinical Indicators: Febrile on admission 102.8, 100.7, herat rate > 90, resp   rate > 20 , lactic acid 1.6, 2.7 , GI panel - norovirus detected , rotavirus   detected, CXR with basilar opacity .  Treatment: admission , imaging, labs including lactic acid and blood cultures   . Iv Antibiotics on  3/6 - Maxipime, Zosyn, Vancomycin. Imodium . Discharge   follow up  with outpatient transplant/[facility] pulmonology team regarding his   diuretic regimen as well as loculated pleural effusion.    Thank You,  Clinical Documentation Dept.  Adis EASLEY, RN, CRCR  Clinical   P: [phone]  Options provided:  -- Sepsis was ruled out after further study.  -- Sepsis present as evidenced by, Please document evidence and treatment for   support  -- Other - I will add my own diagnosis  -- Disagree - Not applicable / Not valid  -- Disagree - Clinically unable to determine / Unknown  -- Refer to Clinical Documentation Reviewer    PROVIDER RESPONSE TEXT:    Sepsis was ruled out after further study.    Query created by: Adis Pena on 3/10/2025 7:03

## 2025-03-12 ENCOUNTER — HOSPITAL ENCOUNTER (OUTPATIENT)
Dept: RADIATION ONCOLOGY | Age: 60
Discharge: HOME | End: 2025-03-12
Payer: COMMERCIAL

## 2025-03-12 LAB — BACTERIA BLD AEROBE CULT: NORMAL

## 2025-03-12 PROCEDURE — 77412 RADIATION TX DELIVERY LVL 3: CPT | Performed by: RADIOLOGY

## 2025-03-12 PROCEDURE — 77280 THER RAD SIMULAJ FIELD SMPL: CPT | Performed by: RADIOLOGY

## 2025-03-12 PROCEDURE — 77334 RADIATION TREATMENT AID(S): CPT | Performed by: RADIOLOGY

## 2025-03-13 ENCOUNTER — HOSPITAL ENCOUNTER (OUTPATIENT)
Dept: RADIATION ONCOLOGY | Age: 60
Discharge: HOME | End: 2025-03-13
Payer: COMMERCIAL

## 2025-03-13 ENCOUNTER — TELEMEDICINE (OUTPATIENT)
Dept: PALLATIVE CARE | Age: 60
End: 2025-03-13
Payer: COMMERCIAL

## 2025-03-13 DIAGNOSIS — C79.51 METASTATIC CARCINOMA INVOLVING BONE WITH UNKNOWN PRIMARY SITE (HCC): ICD-10-CM

## 2025-03-13 DIAGNOSIS — Z51.5 PALLIATIVE CARE PATIENT: ICD-10-CM

## 2025-03-13 DIAGNOSIS — C80.1 METASTATIC CARCINOMA INVOLVING BONE WITH UNKNOWN PRIMARY SITE (HCC): ICD-10-CM

## 2025-03-13 DIAGNOSIS — Z94.0 HISTORY OF RENAL TRANSPLANT: ICD-10-CM

## 2025-03-13 DIAGNOSIS — E44.0 MODERATE MALNUTRITION: Chronic | ICD-10-CM

## 2025-03-13 DIAGNOSIS — C34.90 METASTATIC NON-SMALL CELL LUNG CANCER (HCC): Primary | ICD-10-CM

## 2025-03-13 DIAGNOSIS — G89.3 CANCER RELATED PAIN: ICD-10-CM

## 2025-03-13 PROCEDURE — G6002 STEREOSCOPIC X-RAY GUIDANCE: HCPCS | Performed by: RADIOLOGY

## 2025-03-13 PROCEDURE — 77387 GUIDANCE FOR RADJ TX DLVR: CPT | Performed by: RADIOLOGY

## 2025-03-13 PROCEDURE — 99214 OFFICE O/P EST MOD 30 MIN: CPT

## 2025-03-13 PROCEDURE — 77412 RADIATION TX DELIVERY LVL 3: CPT | Performed by: RADIOLOGY

## 2025-03-13 RX ORDER — SENNOSIDES 8.6 MG
1 TABLET ORAL DAILY PRN
Qty: 120 TABLET | Refills: 0 | Status: SHIPPED | OUTPATIENT
Start: 2025-03-13

## 2025-03-13 RX ORDER — ZINC OXIDE 216 MG/ML
1 LOTION TOPICAL
Qty: 90 EACH | Refills: 2 | Status: SHIPPED | OUTPATIENT
Start: 2025-03-13 | End: 2025-04-12

## 2025-03-13 RX ORDER — OXYCODONE HYDROCHLORIDE 60 MG/1
60 TABLET, FILM COATED, EXTENDED RELEASE ORAL EVERY 12 HOURS
Qty: 60 EACH | Refills: 0 | Status: SHIPPED | OUTPATIENT
Start: 2025-03-13 | End: 2025-04-12

## 2025-03-13 NOTE — PROGRESS NOTES
constipation  Continue MiraLAX 17 g daily scheduled for opioid-induced constipation  We will order nutritional supplements to help with his poor appetite  Discontinue Fentanyl patch 75 mcg/hr every 72 hours scheduled for uncontrolled cancer related pain and cancer related bone pain  PDMP reviewed  Please call the office if your symptoms worsen or if you were to develop new symptoms  Please call the palliative care office when you need refills    No follow-ups on file.    Medications Prescribed:  Orders Placed This Encounter   Medications    oxyCODONE (OXYCONTIN) 60 MG T12A extended release tablet     Sig: Take 60 mg by mouth in the morning and 60 mg in the evening. Do all this for 30 days. Max Daily Amount: 120 mg.     Dispense:  60 each     Refill:  0     Reduce doses taken as pain becomes manageable    Nutritional Supplement LIQD     Sig: Take 1 each by mouth 3 times daily (with meals)     Dispense:  90 each     Refill:  2    senna (SENOKOT) 8.6 MG TABS tablet     Sig: Take 1 tablet by mouth daily as needed for Constipation     Dispense:  120 tablet     Refill:  0       Future Appointments   Date Time Provider Department [place]   3/13/2025  3:45 PM SCHEDULE, STRZ HEMAL LINAC 2 STRZ HEMAL [facility]   3/14/2025  9:00 AM STR OUT PT ONC CMA STRZ OP ONC [facility] HOD   3/14/2025  9:00 AM Awada, Hassan, MD N Oncology [facility]   3/14/2025  3:45 PM SCHEDULE, STRZ HEMAL LINAC 2 STRZ HEMAL [facility] HOD   3/17/2025  3:45 PM SCHEDULE, STRZ HEMAL LINAC 2 STRZ HEMAL [facility] HOD   3/18/2025  2:40 PM Dax Oates MD N [facility]   3/18/2025  3:45 PM SCHEDULE, STRZ HEMAL LINAC 2 STRZ HEMAL [facility] HOD   3/19/2025  3:45 PM SCHEDULE, STRZ HEMAL LINAC 2 STRZ HEMAL [facility] HOD   3/20/2025  3:45 PM SCHEDULE, STRZ HEMAL LINAC 2 STRZ HEMAL [facility] HOD   3/21/2025  3:45 PM SCHEDULE, STRZ HEMAL LINAC 2 STRZ HEMAL [facility] HOD   3/24/2025  3:45 PM SCHEDULE, STRZ HEMAL LINAC 2 STRZ HEMAL [facility] HOD   3/25/2025  3:45 PM

## 2025-03-14 ENCOUNTER — CLINICAL DOCUMENTATION (OUTPATIENT)
Dept: CASE MANAGEMENT | Age: 60
End: 2025-03-14

## 2025-03-14 ENCOUNTER — HOSPITAL ENCOUNTER (OUTPATIENT)
Dept: INFUSION THERAPY | Age: 60
Discharge: HOME | End: 2025-03-14
Payer: COMMERCIAL

## 2025-03-14 ENCOUNTER — HOSPITAL ENCOUNTER (OUTPATIENT)
Dept: RADIATION ONCOLOGY | Age: 60
Discharge: HOME | End: 2025-03-14
Payer: COMMERCIAL

## 2025-03-14 ENCOUNTER — OFFICE VISIT (OUTPATIENT)
Dept: ONCOLOGY | Age: 60
End: 2025-03-14
Payer: COMMERCIAL

## 2025-03-14 VITALS
WEIGHT: 194.8 LBS | DIASTOLIC BLOOD PRESSURE: 55 MMHG | HEIGHT: 69 IN | OXYGEN SATURATION: 94 % | HEART RATE: 96 BPM | TEMPERATURE: 98.5 F | RESPIRATION RATE: 16 BRPM | SYSTOLIC BLOOD PRESSURE: 117 MMHG | BODY MASS INDEX: 28.85 KG/M2

## 2025-03-14 DIAGNOSIS — Z94.81 HISTORY OF BONE MARROW TRANSPLANT (HCC): ICD-10-CM

## 2025-03-14 DIAGNOSIS — Z85.72 HISTORY OF NON-HODGKIN LYMPHOMA: ICD-10-CM

## 2025-03-14 DIAGNOSIS — Z94.0 HISTORY OF RENAL TRANSPLANT: ICD-10-CM

## 2025-03-14 DIAGNOSIS — J90 RECURRENT PLEURAL EFFUSION ON RIGHT: ICD-10-CM

## 2025-03-14 DIAGNOSIS — D64.9 ANEMIA, UNSPECIFIED TYPE: ICD-10-CM

## 2025-03-14 DIAGNOSIS — Z85.820 HISTORY OF MELANOMA: ICD-10-CM

## 2025-03-14 DIAGNOSIS — C34.90 METASTATIC NON-SMALL CELL LUNG CANCER (HCC): Primary | ICD-10-CM

## 2025-03-14 LAB
BASOPHILS ABSOLUTE: 0 THOU/MM3 (ref 0–0.1)
BASOPHILS NFR BLD AUTO: 0 % (ref 0–3)
EOSINOPHIL NFR BLD AUTO: 0 % (ref 0–4)
EOSINOPHILS ABSOLUTE: 0 THOU/MM3 (ref 0–0.4)
ERYTHROCYTE [DISTWIDTH] IN BLOOD BY AUTOMATED COUNT: 19.8 % (ref 11.5–14.5)
HCT VFR BLD AUTO: 30.4 % (ref 42–52)
HGB BLD-MCNC: 8.7 GM/DL (ref 14–18)
IMMATURE GRANULOCYTES %: 1 %
IMMATURE GRANULOCYTES ABSOLUTE: 0.1 THOU/MM3 (ref 0–0.07)
LYMPHOCYTES ABSOLUTE: 1 THOU/MM3 (ref 1–4.8)
LYMPHOCYTES NFR BLD AUTO: 10 % (ref 15–47)
MCH RBC QN AUTO: 25.6 PG (ref 26–33)
MCHC RBC AUTO-ENTMCNC: 28.6 GM/DL (ref 32.2–35.5)
MCV RBC AUTO: 89 FL (ref 80–94)
MONOCYTES ABSOLUTE: 1.3 THOU/MM3 (ref 0.4–1.3)
MONOCYTES NFR BLD AUTO: 14 % (ref 0–12)
NEUTROPHILS ABSOLUTE: 7.2 THOU/MM3 (ref 1.8–7.7)
NEUTROPHILS NFR BLD AUTO: 75 % (ref 43–75)
PLATELET # BLD AUTO: 184 THOU/MM3 (ref 130–400)
PMV BLD AUTO: 9.5 FL (ref 9.4–12.4)
RBC # BLD AUTO: 3.4 MILL/MM3 (ref 4.7–6.1)
WBC # BLD AUTO: 9.6 THOU/MM3 (ref 4.8–10.8)

## 2025-03-14 PROCEDURE — 3074F SYST BP LT 130 MM HG: CPT | Performed by: INTERNAL MEDICINE

## 2025-03-14 PROCEDURE — 36415 COLL VENOUS BLD VENIPUNCTURE: CPT

## 2025-03-14 PROCEDURE — 99214 OFFICE O/P EST MOD 30 MIN: CPT | Performed by: INTERNAL MEDICINE

## 2025-03-14 PROCEDURE — 77412 RADIATION TX DELIVERY LVL 3: CPT | Performed by: RADIOLOGY

## 2025-03-14 PROCEDURE — 77387 GUIDANCE FOR RADJ TX DLVR: CPT | Performed by: RADIOLOGY

## 2025-03-14 PROCEDURE — 85025 COMPLETE CBC W/AUTO DIFF WBC: CPT

## 2025-03-14 PROCEDURE — 3078F DIAST BP <80 MM HG: CPT | Performed by: INTERNAL MEDICINE

## 2025-03-14 PROCEDURE — 99211 OFF/OP EST MAY X REQ PHY/QHP: CPT

## 2025-03-14 PROCEDURE — 77427 RADIATION TX MANAGEMENT X5: CPT | Performed by: RADIOLOGY

## 2025-03-14 NOTE — PROGRESS NOTES
suggestive of osteopenia. T11 shows mild anterior wedge   deformity, compatible with mild chronic compression fracture. No carlos alberto   pathological collapse.     The lesions, mostly rounded, of varying sizes, are seen involving multiple   thoracic vertebrae. These are mostly in the vertebral bodies, but there is   some posterior element involvement as well. Posterior left T10 rib is also   evolved, with suggestion of subtle lesions, some other ribs. Lesions are   hypointense on T1, variably hyperintense on STIR, and show variable but mostly   avid enhancement. Most likely, they represent metastatic lesions related to   the patient's non-small cell lung cancer.     T2 lesion involves posterior vertebral body, with erosion of cortex centrally   and toward the right, and probable very early right anterior epidural   extension. There may also be some early epidural involvement at the lower T7   level, anteriorly on the right and laterally in the left. There may also be   some very early involvement of the left T7-T8 neural foramen. Spinal canal is   patent. No cord compression.     Mild to moderate degenerative changes throughout the thoracic spine. No   significant thoracic disc herniation. No underlying spinal canal stenosis.     Spinal cord shows normal signal.     Small right pleural effusion noted.    IMPRESSION:  1. Diffuse osseous metastatic disease involving the thoracic spine.  2.  Possible very early epidural extension at several levels, as described   above.   3. Mild decrease in height of thoracic vertebral bodies at multiple levels,   appears to be osteopenic rather than pathological in origin.      2/18/2025 Imaging    CT chest    IMPRESSION:  1.  Stable or decreased size of right middle lobe nodule. Stable additional   scattered nodules.   2.  Small right pleural effusion with Pleurx catheter in place.   3.  No thoracic lymphadenopathy.   4.  Diffuse osseous metastasis.   5.  Please see separately dictated CT

## 2025-03-14 NOTE — PROGRESS NOTES
Name: Ousmane Lockett  : 1965  MRN: [id number]    Oncology Navigation Follow-Up Note    Contact Type:  Medical Oncology  Spouse accompanied appointment    Subjective: has pleur x drain- draining minimally despite xray showing moderate effusion.Contacted [facility] with no help -next apt .Went to ED with no assistance.     Objective: MD f/u apt    Oncology Plan of Care:     -MD informed holding tx until radiation is complete.     -PET scheduled 2025     -urgent referral to pulmonary     -return MD apt  for lab/tx        Assistance Needed: isaac contacted Dr. Tellez office- apt 3/17/2025 @1200 -pt informed     Receptive to Advanced Care Planning / Palliative Care:  following    Education: isaac reiterated ONC POC    Notes: isaac following to assist & support as needed    Electronically signed by Veronica Portillo RN on 3/14/2025 at 12:39 PM

## 2025-03-14 NOTE — PATIENT INSTRUCTIONS
-Continue radiation.  -Ordered PET/CT scan for 5/27/2025.  Please help schedule it.  -Urgent referral to pulmonology for management of recurrent right sided pleural effusion and Pleurx cath.  -Return to clinic on 5/28/2025 for follow up and treatment.

## 2025-03-15 ENCOUNTER — LAB (OUTPATIENT)
Dept: LAB | Age: 60
End: 2025-03-15

## 2025-03-15 DIAGNOSIS — D64.9 ANEMIA, UNSPECIFIED TYPE: ICD-10-CM

## 2025-03-15 DIAGNOSIS — Z94.0 HISTORY OF RENAL TRANSPLANT: ICD-10-CM

## 2025-03-15 LAB
ANION GAP SERPL CALC-SCNC: 11 MEQ/L (ref 8–16)
ANION GAP SERPL CALC-SCNC: 12 MEQ/L (ref 8–16)
BUN SERPL-MCNC: 12 MG/DL (ref 8–23)
BUN SERPL-MCNC: 12 MG/DL (ref 8–23)
CALCIUM SERPL-MCNC: 8.8 MG/DL (ref 8.8–10.2)
CHLORIDE SERPL-SCNC: 92 MEQ/L (ref 98–111)
CHLORIDE SERPL-SCNC: 93 MEQ/L (ref 98–111)
CO2 SERPL-SCNC: 30 MEQ/L (ref 22–29)
CO2 SERPL-SCNC: 30 MEQ/L (ref 22–29)
CREAT SERPL-MCNC: 0.6 MG/DL (ref 0.7–1.2)
CREAT SERPL-MCNC: 0.6 MG/DL (ref 0.7–1.2)
DEPRECATED RDW RBC AUTO: 65.2 FL (ref 35–45)
ERYTHROCYTE [DISTWIDTH] IN BLOOD BY AUTOMATED COUNT: 20.3 % (ref 11.5–14.5)
GFR SERPL CREATININE-BSD FRML MDRD: > 90 ML/MIN/1.73M2
GFR SERPL CREATININE-BSD FRML MDRD: > 90 ML/MIN/1.73M2
GLUCOSE SERPL-MCNC: 165 MG/DL (ref 74–109)
HCT VFR BLD AUTO: 29.7 % (ref 42–52)
HGB BLD-MCNC: 8.2 GM/DL (ref 14–18)
MCH RBC QN AUTO: 24.5 PG (ref 26–33)
MCHC RBC AUTO-ENTMCNC: 27.6 GM/DL (ref 32.2–35.5)
MCV RBC AUTO: 88.7 FL (ref 80–94)
PLATELET # BLD AUTO: 203 THOU/MM3 (ref 130–400)
PMV BLD AUTO: 9.9 FL (ref 9.4–12.4)
POTASSIUM SERPL-SCNC: 4.1 MEQ/L (ref 3.5–5.2)
POTASSIUM SERPL-SCNC: 4.1 MEQ/L (ref 3.5–5.2)
RBC # BLD AUTO: 3.35 MILL/MM3 (ref 4.7–6.1)
SCAN OF BLOOD SMEAR: NORMAL
SODIUM SERPL-SCNC: 134 MEQ/L (ref 135–145)
SODIUM SERPL-SCNC: 134 MEQ/L (ref 135–145)
WBC # BLD AUTO: 10.5 THOU/MM3 (ref 4.8–10.8)

## 2025-03-17 ENCOUNTER — HOSPITAL ENCOUNTER (OUTPATIENT)
Dept: RADIATION ONCOLOGY | Age: 60
Discharge: HOME | End: 2025-03-17
Payer: COMMERCIAL

## 2025-03-17 ENCOUNTER — OFFICE VISIT (OUTPATIENT)
Dept: PULMONOLOGY | Age: 60
End: 2025-03-17
Payer: COMMERCIAL

## 2025-03-17 VITALS
TEMPERATURE: 98.3 F | OXYGEN SATURATION: 94 % | RESPIRATION RATE: 18 BRPM | DIASTOLIC BLOOD PRESSURE: 60 MMHG | HEART RATE: 88 BPM | SYSTOLIC BLOOD PRESSURE: 126 MMHG | WEIGHT: 200.4 LBS | BODY MASS INDEX: 29.59 KG/M2

## 2025-03-17 VITALS
OXYGEN SATURATION: 94 % | WEIGHT: 200.4 LBS | HEIGHT: 69 IN | SYSTOLIC BLOOD PRESSURE: 126 MMHG | TEMPERATURE: 98.3 F | BODY MASS INDEX: 29.68 KG/M2 | HEART RATE: 88 BPM | DIASTOLIC BLOOD PRESSURE: 60 MMHG

## 2025-03-17 DIAGNOSIS — C79.51 METASTATIC CARCINOMA INVOLVING BONE WITH UNKNOWN PRIMARY SITE: Primary | ICD-10-CM

## 2025-03-17 DIAGNOSIS — C80.1 METASTATIC CARCINOMA INVOLVING BONE WITH UNKNOWN PRIMARY SITE: Primary | ICD-10-CM

## 2025-03-17 DIAGNOSIS — J90 PLEURAL EFFUSION ON RIGHT: Primary | ICD-10-CM

## 2025-03-17 PROCEDURE — 99214 OFFICE O/P EST MOD 30 MIN: CPT | Performed by: INTERNAL MEDICINE

## 2025-03-17 PROCEDURE — 3078F DIAST BP <80 MM HG: CPT | Performed by: INTERNAL MEDICINE

## 2025-03-17 PROCEDURE — 3074F SYST BP LT 130 MM HG: CPT | Performed by: INTERNAL MEDICINE

## 2025-03-17 PROCEDURE — 77336 RADIATION PHYSICS CONSULT: CPT | Performed by: RADIOLOGY

## 2025-03-17 ASSESSMENT — PAIN SCALES - GENERAL: PAINLEVEL_OUTOF10: 5

## 2025-03-17 ASSESSMENT — PAIN DESCRIPTION - LOCATION: LOCATION: BACK

## 2025-03-17 NOTE — PROGRESS NOTES
[facility]  Pulmonary medicine clinic initial follow up note.      Patient: Ousmane Lockett  : 1965      Chief complaint/[place]:       Ousmane Lockett is a 59 y.o. old male came for further evaluation regarding his pleural effusion with referral from Hassan Awada, MD        He was noted to have pleural effusion on:  He underwent chest CT scan on:  The above chest  CT was performed at:     He was ever seen by a pulmonologist in the past:{YES***/NO:60::\"no\"}  Prior history of pleural effusion:{YES***/NO:60::\"no\"}  He was ever diagnosed with lung cancer:{YES***/NO:60::\"no\"}    He is having shortness of breath:Yes  Onset: Gradual  Duration:{NUMBERS 0-12:}{DURATION:}.  Diurnal variation:  None.  Worse {Time; morning/afternoon/evenin::\"in the morning\"}  Functional status prior to beginning of symptoms: Distance he can walk on level ground: {NUMBERS; 100-1000 :92716} feet.  Current functional capacity on level ground: Distance he can walk on level ground: {NUMBERS; 100-1000 :83412} feet.  He can climb steps: {YES/NO:::\"No\"}  Flights of steps he can climb: {NUMBERS; 0-10:5044}  He gives a history of orthopnea:{YES/NO:::\"Yes\"}  He gives a history of paroxysmal nocturnal dyspnea:{YES/NO:::\"Yes\"}       Eever diagnosed with pneumonia: {YES***/NO:60::\"no\"}  Ever diagnosed with pulmonary tuberculosis:{YES***/NO:60::\"no\"}  He ever tested for PPD: {YES***/NO:60::\"no\"}  Ever diagnosed with malignancy:{YES***/NO:60::\"no\"}  Ever diagnosed with Rheumatoid arthritis or any connective tissue diseases:{YES***/NO:60::\"no\"}  Ever diagnosed with pulmonary embolism or pancreatitis:{YES***/NO:60::\"no\"}  Ever diagnosed with yellow nail syndrome: {YES***/NO:60::\"no\"}  Recent trauma or fall injury to chest wall:{YES***/NO:60::\"no\"}  Recent surgery on his chest: {YES***/NO:60::\"no\"}    He gives a history of fever: 
Neck Circumference -   15 inches  Mallampati - 3    Lung Nodule Screening     [] Qualifies    [x] Does not qualify   [] Declined    [] Completed  
diagnosed with yellow nail syndrome: no   Recent trauma or fall injury to chest wall: no  Recent surgery on his chest: July 2024 right lower lobe lobectomy, right upper lobe wedge resection, lymph node dissection at [facility] Dr Joanna LEUNG thoracic surgeon.  Right VATS procedure 10/9/2024 with Dr. Marshall  History of prior DVT approximately 30 years ago    He gives a history of fever: NO  Duration: 0 days.  Onset:   Chills & rigors:No  Associated night sweats: No.  Recent travel history:No.    Rrecent sick contacts: No.      He is having chest pain:no    Any of his first-degree family relative ever diagnosed with Lung cancer: NO  He ever exposed to asbestos, radon, or uranium in the past: NO    He ever diagnosed with COVID-19 infection in the past:Yes.    He ever had a Colonoscopy performed: YES - 3-4 years ago     He ever had his prostate check exam performed: YES 3-4 years ago    He is currently using any oxygen supplementation at rest, exercise or during sleep/at night time: NO    History of pulmonary embolism in the past: NO         History of DVT in the past: YES      Social History:  Occupation:  He is current working: Yes  Type of profession: .                     Social History     Tobacco Use    Smoking status: Never    Smokeless tobacco: Never   Vaping Use    Vaping status: Never Used   Substance Use Topics    Alcohol use: Not Currently     Alcohol/week: 1.0 standard drink of alcohol     Types: 1 Shots of liquor per week     Comment: very rare use.     Drug use: No       History of recreational or IV drug use in the past:NO  History of Alcohol use: No.       History of exposure to coal mines/coal dust: NO  History of exposure to foundry dust/welding: NO  History of exposure to quarry/silica/sandblasting: NO  History of exposure to asbestos/working with breaks/ships: NO  History of exposure to farm dust: NO  History of recent travel to long distances: NO  History of exposure to birds,

## 2025-03-17 NOTE — PROGRESS NOTES
bed, but not bedbound (Capable of only limited self-care, confined to bed or chair 50% or more of waking hours)     General: NAD, AO x 3, Mentation is clear with appropriate affect.  HEENT: Normocephalic, atraumatic  Thorax:  Appears to have mildly labored breathing   Abdomen:  Non-distended  Neuro:  Cranial nerves grossly intact  Skin - treatment portal: SEE above.  Edema of the left upper extremity and bilateral lower extremities    Chemotherapy Update: Systemic therapy prior to Radiation therapy    Treatment Imaging: Kv Pair, Port Film, and All imaging reviewed and approved by Dr. Garrett/    ASSESSMENT: No significant radiation side effects. Responding appropriately to symptomatic management. However patient looks unwell today and reports worsening shortness of breath accompanied by chest pain and extremity swelling.    New medications, diagnostic results: Hold treatment due to patient's request - he wants to sign on with hospice    PLAN: Again reviewed potential side effects of radiation for the patient's treatment.    Continue local/topical care. Discussed with him our recommendation to seek care in the ER. Also discussed in detail consideration of hospice. The patient states he wants to discontinue treatments and sign on with hospice, wife supportive

## 2025-03-17 NOTE — PATIENT INSTRUCTIONS
Recommendations/Plan:  - Will send patient to interventional radiology service at [facility] to assess current status of right side pleurex catheter placement; assess for need of removal of current catheter +/- placement of new Pleurx catheter in the anterior chest cavity  -Alternately will sent up appointment to [facility] cardiothoracic surgery Dr Wyatt Marshall MD.  Patient reluctant to go to [facility] due to distance, prefer to treat at [facility] if possible.  -Patient was advised to call Dr Wyatt Marshall MD his cardiothoracic surgeon at [facility] or come to the emergency room at [facility] if he became more short of breath.  He verbalized understanding.  - continue lasix per nephrology  - hold eliquis for 24 hours prior to IR procedure.  He was advised to call my office if the interventional radiology service need to hold the  eliquis for more than 24 hours to consider for bridging anticoagulation with Lovenox.  He verbalized understanding.  -Patient to continue his follow-up with his medical oncologist at [facility]  - Schedule patient for follow up with my clinic in 1month with recommended test/s I.e chest x-ray PA and lateral views to be done 1 day before clinic visit. Patient advised to make early appointment if needed.  - Ousmane Lockett and his wife were educated about my impression and plan. They verbalizes understanding.  Questions and concerns addressed.

## 2025-03-18 ENCOUNTER — SOCIAL WORK (OUTPATIENT)
Dept: RADIATION ONCOLOGY | Age: 60
End: 2025-03-18

## 2025-03-18 ENCOUNTER — APPOINTMENT (OUTPATIENT)
Dept: RADIATION ONCOLOGY | Age: 60
End: 2025-03-18
Payer: COMMERCIAL

## 2025-03-18 NOTE — PROGRESS NOTES
- Spoke with Ousmane and Monica this morning since I was notified he has discontinued further treatments.  - Ousmane was enrolled in [employer] for mileage reimbursement for transport between home and the Cancer Center. The application dates were cleared from 2/17/25 - 3/17/25.  - Application was emailed to Aisha @ [facility] and accepted for processing.   - Family was notified of the exchange.   - This staff will remain available for support as needed.

## 2025-03-19 ENCOUNTER — APPOINTMENT (OUTPATIENT)
Dept: RADIATION ONCOLOGY | Age: 60
End: 2025-03-19
Payer: COMMERCIAL

## 2025-03-19 LAB — SIROLIMUS BLD-MCNC: 5.9 NG/ML

## 2025-03-20 ENCOUNTER — APPOINTMENT (OUTPATIENT)
Dept: RADIATION ONCOLOGY | Age: 60
End: 2025-03-20
Payer: COMMERCIAL

## 2025-03-21 ENCOUNTER — APPOINTMENT (OUTPATIENT)
Dept: RADIATION ONCOLOGY | Age: 60
End: 2025-03-21
Payer: COMMERCIAL

## 2025-03-24 ENCOUNTER — APPOINTMENT (OUTPATIENT)
Dept: RADIATION ONCOLOGY | Age: 60
End: 2025-03-24
Payer: COMMERCIAL

## 2025-03-25 ENCOUNTER — APPOINTMENT (OUTPATIENT)
Dept: RADIATION ONCOLOGY | Age: 60
End: 2025-03-25
Payer: COMMERCIAL

## 2025-03-25 ENCOUNTER — HOSPITAL ENCOUNTER (OUTPATIENT)
Dept: RADIATION ONCOLOGY | Age: 60
End: 2025-03-25
Payer: COMMERCIAL

## 2025-03-26 ENCOUNTER — APPOINTMENT (OUTPATIENT)
Dept: RADIATION ONCOLOGY | Age: 60
End: 2025-03-26
Payer: COMMERCIAL

## 2025-03-27 ENCOUNTER — APPOINTMENT (OUTPATIENT)
Dept: RADIATION ONCOLOGY | Age: 60
End: 2025-03-27
Payer: COMMERCIAL

## 2025-03-28 ENCOUNTER — APPOINTMENT (OUTPATIENT)
Dept: RADIATION ONCOLOGY | Age: 60
End: 2025-03-28
Payer: COMMERCIAL

## 2025-03-31 ENCOUNTER — APPOINTMENT (OUTPATIENT)
Dept: RADIATION ONCOLOGY | Age: 60
End: 2025-03-31
Payer: COMMERCIAL

## (undated) DEVICE — APPLIER CLP M L L11.4IN DIA10MM ENDOSCP ROT MULT FOR LIG

## (undated) DEVICE — Z DUPLICATE USE 2431315 SET INSUF TBNG HI FLO W/ SMK EVAC FOR PNEUMOCLEAR

## (undated) DEVICE — PUMP SUC IRR TBNG L10FT W/ HNDPC ASSEMB STRYKEFLOW 2

## (undated) DEVICE — TROCAR: Brand: KII FIOS FIRST ENTRY

## (undated) DEVICE — GLOVE SURG SZ 8 L11.77IN FNGR THK9.8MIL STRW LTX POLYMER

## (undated) DEVICE — BANDAGE ADH W1XL3IN NAT FAB WVN FLX DURABLE N ADH PD SEAL

## (undated) DEVICE — TROCAR: Brand: KII® SLEEVE

## (undated) DEVICE — BANDAGE,GAUZE,4.5"X4.1YD,STERILE,LF: Brand: MEDLINE

## (undated) DEVICE — TROCAR: Brand: KII SHIELDED BLADED ACCESS SYSTEM

## (undated) DEVICE — INSUFFLATION NEEDLE TO ESTABLISH PNEUMOPERITONEUM.: Brand: INSUFFLATION NEEDLE

## (undated) DEVICE — GENERAL LAPAROSCOPY PACK-LF: Brand: MEDLINE INDUSTRIES, INC.

## (undated) DEVICE — UNIVERSAL MONOPOLAR LAPAROSCOPIC CABLE 10FT, 4MM PIN CONNECTOR: Brand: CONMED

## (undated) DEVICE — SPONGE GZ W4XL4IN COT 12 PLY TYP VII WVN C FLD DSGN

## (undated) DEVICE — BANDAGE COMPR M W4INXL10YD WHT BGE VELC E MTRX HK AND LOOP

## (undated) DEVICE — GLOVE ORANGE PI 7   MSG9070

## (undated) DEVICE — SOLUTION ANTIFOG VIS SYS CLEARIFY LAPSCP

## (undated) DEVICE — STERILE COTTON BALLS LARGE 5/P: Brand: MEDLINE

## (undated) DEVICE — GLOVE SURG SZ 7.5 L11.73IN FNGR THK9.8MIL STRW LTX POLYMER

## (undated) DEVICE — SOLUTION IV 1000ML 0.9% SOD CHL PH 5 INJ USP VIAFLX PLAS

## (undated) DEVICE — PACK PROCEDURE SURG PLAS SC MIN SRHP LF

## (undated) DEVICE — Device